# Patient Record
Sex: FEMALE | Race: WHITE | NOT HISPANIC OR LATINO | Employment: FULL TIME | URBAN - METROPOLITAN AREA
[De-identification: names, ages, dates, MRNs, and addresses within clinical notes are randomized per-mention and may not be internally consistent; named-entity substitution may affect disease eponyms.]

---

## 2017-09-05 ENCOUNTER — APPOINTMENT (OUTPATIENT)
Dept: WOMENS IMAGING | Facility: HOSPITAL | Age: 72
End: 2017-09-05

## 2017-09-05 PROCEDURE — 77063 BREAST TOMOSYNTHESIS BI: CPT | Performed by: RADIOLOGY

## 2017-09-05 PROCEDURE — G0202 SCR MAMMO BI INCL CAD: HCPCS | Performed by: RADIOLOGY

## 2020-03-06 ENCOUNTER — APPOINTMENT (OUTPATIENT)
Dept: WOMENS IMAGING | Facility: HOSPITAL | Age: 75
End: 2020-03-06

## 2020-03-06 PROCEDURE — 77063 BREAST TOMOSYNTHESIS BI: CPT | Performed by: RADIOLOGY

## 2020-03-06 PROCEDURE — 77067 SCR MAMMO BI INCL CAD: CPT | Performed by: RADIOLOGY

## 2020-12-17 ENCOUNTER — APPOINTMENT (OUTPATIENT)
Dept: CT IMAGING | Facility: HOSPITAL | Age: 75
End: 2020-12-17

## 2020-12-17 ENCOUNTER — HOSPITAL ENCOUNTER (EMERGENCY)
Facility: HOSPITAL | Age: 75
Discharge: HOME | End: 2020-12-17
Attending: EMERGENCY MEDICINE | Admitting: EMERGENCY MEDICINE

## 2020-12-17 VITALS
DIASTOLIC BLOOD PRESSURE: 83 MMHG | HEART RATE: 82 BPM | HEIGHT: 60 IN | TEMPERATURE: 98.6 F | RESPIRATION RATE: 18 BRPM | SYSTOLIC BLOOD PRESSURE: 178 MMHG | OXYGEN SATURATION: 99 %

## 2020-12-17 DIAGNOSIS — R19.7 VOMITING AND DIARRHEA: Primary | ICD-10-CM

## 2020-12-17 DIAGNOSIS — R11.10 VOMITING AND DIARRHEA: Primary | ICD-10-CM

## 2020-12-17 LAB
ALBUMIN SERPL-MCNC: 4 G/DL (ref 3.5–5.2)
ALBUMIN/GLOB SERPL: 1.4 G/DL
ALP SERPL-CCNC: 78 U/L (ref 39–117)
ALT SERPL W P-5'-P-CCNC: 19 U/L (ref 1–33)
ANION GAP SERPL CALCULATED.3IONS-SCNC: 11.6 MMOL/L (ref 5–15)
AST SERPL-CCNC: 24 U/L (ref 1–32)
BASOPHILS # BLD AUTO: 0.03 10*3/MM3 (ref 0–0.2)
BASOPHILS NFR BLD AUTO: 0.2 % (ref 0–1.5)
BILIRUB SERPL-MCNC: 0.4 MG/DL (ref 0–1.2)
BUN SERPL-MCNC: 17 MG/DL (ref 8–23)
BUN/CREAT SERPL: 22.7 (ref 7–25)
CALCIUM SPEC-SCNC: 8.6 MG/DL (ref 8.6–10.5)
CHLORIDE SERPL-SCNC: 100 MMOL/L (ref 98–107)
CO2 SERPL-SCNC: 28.4 MMOL/L (ref 22–29)
CREAT SERPL-MCNC: 0.75 MG/DL (ref 0.57–1)
DEPRECATED RDW RBC AUTO: 41.5 FL (ref 37–54)
EOSINOPHIL # BLD AUTO: 0.06 10*3/MM3 (ref 0–0.4)
EOSINOPHIL NFR BLD AUTO: 0.5 % (ref 0.3–6.2)
ERYTHROCYTE [DISTWIDTH] IN BLOOD BY AUTOMATED COUNT: 12.1 % (ref 12.3–15.4)
GFR SERPL CREATININE-BSD FRML MDRD: 76 ML/MIN/1.73
GLOBULIN UR ELPH-MCNC: 2.9 GM/DL
GLUCOSE SERPL-MCNC: 166 MG/DL (ref 65–99)
HCT VFR BLD AUTO: 40.4 % (ref 34–46.6)
HGB BLD-MCNC: 13.5 G/DL (ref 12–15.9)
IMM GRANULOCYTES # BLD AUTO: 0.08 10*3/MM3 (ref 0–0.05)
IMM GRANULOCYTES NFR BLD AUTO: 0.6 % (ref 0–0.5)
LIPASE SERPL-CCNC: 17 U/L (ref 13–60)
LYMPHOCYTES # BLD AUTO: 0.25 10*3/MM3 (ref 0.7–3.1)
LYMPHOCYTES NFR BLD AUTO: 1.9 % (ref 19.6–45.3)
MCH RBC QN AUTO: 31.5 PG (ref 26.6–33)
MCHC RBC AUTO-ENTMCNC: 33.4 G/DL (ref 31.5–35.7)
MCV RBC AUTO: 94.4 FL (ref 79–97)
MONOCYTES # BLD AUTO: 0.68 10*3/MM3 (ref 0.1–0.9)
MONOCYTES NFR BLD AUTO: 5.1 % (ref 5–12)
NEUTROPHILS NFR BLD AUTO: 12.23 10*3/MM3 (ref 1.7–7)
NEUTROPHILS NFR BLD AUTO: 91.7 % (ref 42.7–76)
NRBC BLD AUTO-RTO: 0 /100 WBC (ref 0–0.2)
PLATELET # BLD AUTO: 246 10*3/MM3 (ref 140–450)
PMV BLD AUTO: 10.2 FL (ref 6–12)
POTASSIUM SERPL-SCNC: 3.8 MMOL/L (ref 3.5–5.2)
PROT SERPL-MCNC: 6.9 G/DL (ref 6–8.5)
RBC # BLD AUTO: 4.28 10*6/MM3 (ref 3.77–5.28)
SODIUM SERPL-SCNC: 140 MMOL/L (ref 136–145)
TROPONIN T SERPL-MCNC: <0.01 NG/ML (ref 0–0.03)
WBC # BLD AUTO: 13.33 10*3/MM3 (ref 3.4–10.8)

## 2020-12-17 PROCEDURE — 25010000002 ONDANSETRON PER 1 MG: Performed by: EMERGENCY MEDICINE

## 2020-12-17 PROCEDURE — 96375 TX/PRO/DX INJ NEW DRUG ADDON: CPT

## 2020-12-17 PROCEDURE — 84484 ASSAY OF TROPONIN QUANT: CPT | Performed by: EMERGENCY MEDICINE

## 2020-12-17 PROCEDURE — C9803 HOPD COVID-19 SPEC COLLECT: HCPCS | Performed by: EMERGENCY MEDICINE

## 2020-12-17 PROCEDURE — 93005 ELECTROCARDIOGRAM TRACING: CPT | Performed by: EMERGENCY MEDICINE

## 2020-12-17 PROCEDURE — 25010000002 HYDROMORPHONE PER 4 MG: Performed by: EMERGENCY MEDICINE

## 2020-12-17 PROCEDURE — 85025 COMPLETE CBC W/AUTO DIFF WBC: CPT | Performed by: EMERGENCY MEDICINE

## 2020-12-17 PROCEDURE — 74177 CT ABD & PELVIS W/CONTRAST: CPT

## 2020-12-17 PROCEDURE — 93010 ELECTROCARDIOGRAM REPORT: CPT | Performed by: INTERNAL MEDICINE

## 2020-12-17 PROCEDURE — 99283 EMERGENCY DEPT VISIT LOW MDM: CPT

## 2020-12-17 PROCEDURE — 83690 ASSAY OF LIPASE: CPT | Performed by: EMERGENCY MEDICINE

## 2020-12-17 PROCEDURE — 80053 COMPREHEN METABOLIC PANEL: CPT | Performed by: EMERGENCY MEDICINE

## 2020-12-17 PROCEDURE — 96374 THER/PROPH/DIAG INJ IV PUSH: CPT

## 2020-12-17 PROCEDURE — U0004 COV-19 TEST NON-CDC HGH THRU: HCPCS | Performed by: EMERGENCY MEDICINE

## 2020-12-17 PROCEDURE — 25010000002 IOPAMIDOL 61 % SOLUTION: Performed by: EMERGENCY MEDICINE

## 2020-12-17 RX ORDER — HYDROMORPHONE HYDROCHLORIDE 1 MG/ML
0.5 INJECTION, SOLUTION INTRAMUSCULAR; INTRAVENOUS; SUBCUTANEOUS ONCE
Status: COMPLETED | OUTPATIENT
Start: 2020-12-17 | End: 2020-12-17

## 2020-12-17 RX ORDER — ONDANSETRON 4 MG/1
4 TABLET, ORALLY DISINTEGRATING ORAL 4 TIMES DAILY PRN
Qty: 10 TABLET | Refills: 0 | Status: SHIPPED | OUTPATIENT
Start: 2020-12-17 | End: 2022-09-01

## 2020-12-17 RX ORDER — ONDANSETRON 2 MG/ML
4 INJECTION INTRAMUSCULAR; INTRAVENOUS ONCE
Status: COMPLETED | OUTPATIENT
Start: 2020-12-17 | End: 2020-12-17

## 2020-12-17 RX ADMIN — HYDROMORPHONE HYDROCHLORIDE 0.5 MG: 1 INJECTION, SOLUTION INTRAMUSCULAR; INTRAVENOUS; SUBCUTANEOUS at 21:43

## 2020-12-17 RX ADMIN — ONDANSETRON 4 MG: 2 INJECTION INTRAMUSCULAR; INTRAVENOUS at 21:44

## 2020-12-17 RX ADMIN — IOPAMIDOL 85 ML: 612 INJECTION, SOLUTION INTRAVENOUS at 22:42

## 2020-12-17 RX ADMIN — SODIUM CHLORIDE 1000 ML: 9 INJECTION, SOLUTION INTRAVENOUS at 21:38

## 2020-12-18 LAB
QT INTERVAL: 400 MS
SARS-COV-2 RNA RESP QL NAA+PROBE: NOT DETECTED

## 2020-12-18 NOTE — ED PROVIDER NOTES
EMERGENCY DEPARTMENT ENCOUNTER    Room Number:  24/24  PCP: Kike Yu MD  Historian: Patient  History Limited By: Nothing      HPI  Chief Complaint: Abdominal pain vomiting and diarrhea  Context: Aliya Villaseñor is a 75 y.o. female who presents to the ED c/o abdominal pain vomiting and diarrhea.  Patient states she ate fish at noon.  States about 5:00 she started having abdominal pain with vomiting.  Shortly afterwards she started having diarrhea.  States abdominal pain is mostly on the right side.  Has had no dysuria or hematuria.  Has had no vaginal bleeding.  Has had no chest pain.  Patient has prior history of multiple abdominal surgeries.  Has had no fever cough congestion exposure to Covid patients.      Location: Right-sided  Radiation: None  Character: Cramping  Duration: 4 hours  Severity: Moderate   Progression: Not improving  Aggravating Factors: Nothing  Alleviating Factors: Nothing        MEDICAL RECORD REVIEW    History of anxiety and allergies per epic          PAST MEDICAL HISTORY  Active Ambulatory Problems     Diagnosis Date Noted   • No Active Ambulatory Problems     Resolved Ambulatory Problems     Diagnosis Date Noted   • No Resolved Ambulatory Problems     No Additional Past Medical History         PAST SURGICAL HISTORY  No past surgical history on file.      FAMILY HISTORY  No family history on file.      SOCIAL HISTORY  Social History     Socioeconomic History   • Marital status:      Spouse name: Not on file   • Number of children: Not on file   • Years of education: Not on file   • Highest education level: Not on file         ALLERGIES  Amlodipine, Cephalosporins, Ciprofloxacin, Erythromycin, Nefazodone, Sulfamethoxazole-trimethoprim, and Valsartan        REVIEW OF SYSTEMS  Review of Systems   Constitutional: Negative for fever.   HENT: Negative for sore throat.    Eyes: Negative.    Respiratory: Negative for cough and shortness of breath.    Cardiovascular: Negative for  chest pain.   Gastrointestinal: Positive for abdominal distention, diarrhea, nausea and vomiting. Negative for abdominal pain.   Genitourinary: Negative for dysuria.   Musculoskeletal: Negative for neck pain.   Skin: Negative for rash.   Allergic/Immunologic: Negative.    Neurological: Negative for weakness, numbness and headaches.   Hematological: Negative.    Psychiatric/Behavioral: Negative.    All other systems reviewed and are negative.           PHYSICAL EXAM  ED Triage Vitals [12/17/20 2122]   Temp Heart Rate Resp BP SpO2   97.5 °F (36.4 °C) 77 16 -- 95 %      Temp src Heart Rate Source Patient Position BP Location FiO2 (%)   Tympanic Monitor -- -- --       Physical Exam   Constitutional: She is oriented to person, place, and time. No distress.   HENT:   Head: Normocephalic and atraumatic.   Eyes: Pupils are equal, round, and reactive to light. EOM are normal.   Neck: Normal range of motion. Neck supple.   Cardiovascular: Normal rate, regular rhythm and normal heart sounds.   Pulmonary/Chest: Effort normal and breath sounds normal. No respiratory distress.   Abdominal: Soft. There is abdominal tenderness. There is no rebound and no guarding.   Moderate right lower quadrant   Musculoskeletal: Normal range of motion.         General: No edema.   Neurological: She is alert and oriented to person, place, and time. She has normal sensation and normal strength.   Skin: Skin is warm and dry. No rash noted.   Psychiatric: Mood and affect normal.   Nursing note and vitals reviewed.    Patient was wearing a face mask when I entered the room and they continued to wear a mask throughout their stay in the ED.  I wore PPE, including a gown, gloves, face mask with shield or face mask with goggles whenever I was in the room with patient.       LAB RESULTS  Recent Results (from the past 24 hour(s))   Comprehensive Metabolic Panel    Collection Time: 12/17/20  9:38 PM    Specimen: Blood   Result Value Ref Range    Glucose 166  (H) 65 - 99 mg/dL    BUN 17 8 - 23 mg/dL    Creatinine 0.75 0.57 - 1.00 mg/dL    Sodium 140 136 - 145 mmol/L    Potassium 3.8 3.5 - 5.2 mmol/L    Chloride 100 98 - 107 mmol/L    CO2 28.4 22.0 - 29.0 mmol/L    Calcium 8.6 8.6 - 10.5 mg/dL    Total Protein 6.9 6.0 - 8.5 g/dL    Albumin 4.00 3.50 - 5.20 g/dL    ALT (SGPT) 19 1 - 33 U/L    AST (SGOT) 24 1 - 32 U/L    Alkaline Phosphatase 78 39 - 117 U/L    Total Bilirubin 0.4 0.0 - 1.2 mg/dL    eGFR Non African Amer 76 >60 mL/min/1.73    Globulin 2.9 gm/dL    A/G Ratio 1.4 g/dL    BUN/Creatinine Ratio 22.7 7.0 - 25.0    Anion Gap 11.6 5.0 - 15.0 mmol/L   Lipase    Collection Time: 12/17/20  9:38 PM    Specimen: Blood   Result Value Ref Range    Lipase 17 13 - 60 U/L   Troponin    Collection Time: 12/17/20  9:38 PM    Specimen: Blood   Result Value Ref Range    Troponin T <0.010 0.000 - 0.030 ng/mL   CBC Auto Differential    Collection Time: 12/17/20  9:38 PM    Specimen: Blood   Result Value Ref Range    WBC 13.33 (H) 3.40 - 10.80 10*3/mm3    RBC 4.28 3.77 - 5.28 10*6/mm3    Hemoglobin 13.5 12.0 - 15.9 g/dL    Hematocrit 40.4 34.0 - 46.6 %    MCV 94.4 79.0 - 97.0 fL    MCH 31.5 26.6 - 33.0 pg    MCHC 33.4 31.5 - 35.7 g/dL    RDW 12.1 (L) 12.3 - 15.4 %    RDW-SD 41.5 37.0 - 54.0 fl    MPV 10.2 6.0 - 12.0 fL    Platelets 246 140 - 450 10*3/mm3    Neutrophil % 91.7 (H) 42.7 - 76.0 %    Lymphocyte % 1.9 (L) 19.6 - 45.3 %    Monocyte % 5.1 5.0 - 12.0 %    Eosinophil % 0.5 0.3 - 6.2 %    Basophil % 0.2 0.0 - 1.5 %    Immature Grans % 0.6 (H) 0.0 - 0.5 %    Neutrophils, Absolute 12.23 (H) 1.70 - 7.00 10*3/mm3    Lymphocytes, Absolute 0.25 (L) 0.70 - 3.10 10*3/mm3    Monocytes, Absolute 0.68 0.10 - 0.90 10*3/mm3    Eosinophils, Absolute 0.06 0.00 - 0.40 10*3/mm3    Basophils, Absolute 0.03 0.00 - 0.20 10*3/mm3    Immature Grans, Absolute 0.08 (H) 0.00 - 0.05 10*3/mm3    nRBC 0.0 0.0 - 0.2 /100 WBC   ECG 12 Lead    Collection Time: 12/17/20  9:40 PM   Result Value Ref Range     QT Interval 400 ms       Ordered the above labs and reviewed the results.        RADIOLOGY  CT Abdomen Pelvis With Contrast   Final Result   The patient has intrauterine extrahepatic biliary dilatation, uncertain   clinical significance. Common bile duct measures approximately 9 mm, in   the appearance may represent postcholecystectomy change, but the degree   of intrahepatic biliary dilatation is somewhat pronounced, and   correlation with liver function tests is suggested. No obstructing   lesion is seen, and there is no pancreatic ductal dilatation.       Radiation dose reduction techniques were utilized, including automated   exposure control and exposure modulation based on body size.       This report was finalized on 12/17/2020 11:11 PM by Dr. Shabana Leblanc M.D.               Ordered the above noted radiological studies. Reviewed by me in PACS.            PROCEDURES  Procedures      EKG:          EKG time: 2140  Rhythm/Rate: Normal sinus rhythm 74  P waves and MO: Normal P waves  QRS, axis: Normal QRS  ST and T waves: Normal ST-T waves    Interpreted Contemporaneously by me, independently viewed  No prior        MEDICATIONS GIVEN IN ER  Medications   sodium chloride 0.9 % bolus 1,000 mL (0 mL Intravenous Stopped 12/17/20 2235)   HYDROmorphone (DILAUDID) injection 0.5 mg (0.5 mg Intravenous Given 12/17/20 2143)   ondansetron (ZOFRAN) injection 4 mg (4 mg Intravenous Given 12/17/20 2144)   iopamidol (ISOVUE-300) 61 % injection 100 mL (85 mL Intravenous Given by Other 12/17/20 2242)             PROGRESS AND CONSULTS  ED Course as of Dec 18 0027   Thu Dec 17, 2020   2323 23:23 EST  Patient presents for vomiting and diarrhea.  Patient's lab work shows mildly elevated white blood cell count but normal liver function test.  CT scan shows no evidence of obstruction.  Patient does have some biliary dilatation however normal liver function test.  Patient has had no vomiting or diarrhea here.  Patient will be  discharged home with Zofran.  We will swab her for Covid prior to discharge.    [SL]      ED Course User Index  [SL] Jose Roberto Sanchez MD           MEDICAL DECISION MAKING      MDM  Number of Diagnoses or Management Options  Vomiting and diarrhea:      Amount and/or Complexity of Data Reviewed  Clinical lab tests: reviewed and ordered (Troponin 0)  Tests in the radiology section of CPT®: reviewed and ordered (CT scan with enteritis)  Tests in the medicine section of CPT®: reviewed               DIAGNOSIS  Final diagnoses:   Vomiting and diarrhea           DISPOSITION  DISCHARGE    Patient discharged in stable condition.    Reviewed implications of results, diagnosis, meds, responsibility to follow up, warning signs and symptoms of possible worsening, potential complications and reasons to return to ER, including worsening pain.    Patient/Family voiced understanding of above instructions.    Discussed plan for discharge, as there is no emergent indication for admission. Patient referred to primary care provider for BP management due to today's BP. Pt/family is agreeable and understands need for follow up and repeat testing.  Pt is aware that discharge does not mean that nothing is wrong but it indicates no emergency is present that requires admission and they must continue care with follow-up as given below or physician of their choice.     FOLLOW-UP  Kike Yu MD  [address] [id number] [phone]    Schedule an appointment as soon as possible for a visit            Medication List      New Prescriptions    ondansetron ODT 4 MG disintegrating tablet  Commonly known as: ZOFRAN-ODT  Place 1 tablet on the tongue 4 (Four) Times a Day As Needed for Nausea or Vomiting.           Where to Get Your Medications      You can get these medications from any pharmacy    Bring a paper prescription for each of these medications  · ondansetron ODT 4 MG disintegrating tablet             Latest  Documented Vital Signs:  As of 00:27 EST  BP- 178/83 HR- 82 Temp- 98.6 °F (37 °C) (Tympanic) O2 sat- 99%                         Jose Roberto Sanchez MD  12/18/20 0029

## 2020-12-18 NOTE — ED TRIAGE NOTES
Pt to ED from home with c/o diffuse abdominal pain and multiple episodes of nausea with vomiting that started at 5pm. Pt denies any chronic abdominal issues.  Pt denies fever, cough, SOB, or any recent exposures.    Pt masked upon arrival. This nurse wearing mask and goggles during entire encounter.

## 2022-09-01 ENCOUNTER — APPOINTMENT (OUTPATIENT)
Dept: CT IMAGING | Facility: HOSPITAL | Age: 77
End: 2022-09-01

## 2022-09-01 ENCOUNTER — APPOINTMENT (OUTPATIENT)
Dept: GENERAL RADIOLOGY | Facility: HOSPITAL | Age: 77
End: 2022-09-01

## 2022-09-01 ENCOUNTER — HOSPITAL ENCOUNTER (OUTPATIENT)
Facility: HOSPITAL | Age: 77
Setting detail: OBSERVATION
Discharge: HOME | End: 2022-09-02
Attending: EMERGENCY MEDICINE | Admitting: EMERGENCY MEDICINE

## 2022-09-01 ENCOUNTER — APPOINTMENT (OUTPATIENT)
Dept: MRI IMAGING | Facility: HOSPITAL | Age: 77
End: 2022-09-01

## 2022-09-01 DIAGNOSIS — R91.1 LEFT UPPER LOBE PULMONARY NODULE: ICD-10-CM

## 2022-09-01 DIAGNOSIS — R55 SYNCOPE, UNSPECIFIED SYNCOPE TYPE: Primary | ICD-10-CM

## 2022-09-01 DIAGNOSIS — R42 VERTIGO: ICD-10-CM

## 2022-09-01 PROBLEM — I63.9 CVA (CEREBRAL VASCULAR ACCIDENT) (HCC): Status: ACTIVE | Noted: 2022-09-01

## 2022-09-01 LAB
ALBUMIN SERPL-MCNC: 4 G/DL (ref 3.5–5.2)
ALBUMIN/GLOB SERPL: 1.2 G/DL
ALP SERPL-CCNC: 118 U/L (ref 39–117)
ALT SERPL W P-5'-P-CCNC: 22 U/L (ref 1–33)
ANION GAP SERPL CALCULATED.3IONS-SCNC: 10.4 MMOL/L (ref 5–15)
AST SERPL-CCNC: 20 U/L (ref 1–32)
BASOPHILS # BLD AUTO: 0.02 10*3/MM3 (ref 0–0.2)
BASOPHILS NFR BLD AUTO: 0.3 % (ref 0–1.5)
BILIRUB SERPL-MCNC: 0.3 MG/DL (ref 0–1.2)
BILIRUB UR QL STRIP: NEGATIVE
BUN SERPL-MCNC: 11 MG/DL (ref 8–23)
BUN/CREAT SERPL: 17.2 (ref 7–25)
CALCIUM SPEC-SCNC: 9.5 MG/DL (ref 8.6–10.5)
CHLORIDE SERPL-SCNC: 100 MMOL/L (ref 98–107)
CHOLEST SERPL-MCNC: 189 MG/DL (ref 0–200)
CLARITY UR: CLEAR
CO2 SERPL-SCNC: 30.6 MMOL/L (ref 22–29)
COLOR UR: YELLOW
CREAT SERPL-MCNC: 0.64 MG/DL (ref 0.57–1)
DEPRECATED RDW RBC AUTO: 39.2 FL (ref 37–54)
EGFRCR SERPLBLD CKD-EPI 2021: 91.7 ML/MIN/1.73
EOSINOPHIL # BLD AUTO: 0.06 10*3/MM3 (ref 0–0.4)
EOSINOPHIL NFR BLD AUTO: 0.9 % (ref 0.3–6.2)
ERYTHROCYTE [DISTWIDTH] IN BLOOD BY AUTOMATED COUNT: 12 % (ref 12.3–15.4)
GLOBULIN UR ELPH-MCNC: 3.3 GM/DL
GLUCOSE SERPL-MCNC: 96 MG/DL (ref 65–99)
GLUCOSE UR STRIP-MCNC: NEGATIVE MG/DL
HBA1C MFR BLD: 6 % (ref 4.8–5.6)
HCT VFR BLD AUTO: 37.9 % (ref 34–46.6)
HDLC SERPL-MCNC: 83 MG/DL (ref 40–60)
HGB BLD-MCNC: 12.6 G/DL (ref 12–15.9)
HGB UR QL STRIP.AUTO: NEGATIVE
IMM GRANULOCYTES # BLD AUTO: 0.01 10*3/MM3 (ref 0–0.05)
IMM GRANULOCYTES NFR BLD AUTO: 0.1 % (ref 0–0.5)
KETONES UR QL STRIP: NEGATIVE
LDLC SERPL CALC-MCNC: 82 MG/DL (ref 0–100)
LDLC/HDLC SERPL: 0.94 {RATIO}
LEUKOCYTE ESTERASE UR QL STRIP.AUTO: NEGATIVE
LYMPHOCYTES # BLD AUTO: 1.22 10*3/MM3 (ref 0.7–3.1)
LYMPHOCYTES NFR BLD AUTO: 17.6 % (ref 19.6–45.3)
MCH RBC QN AUTO: 29.8 PG (ref 26.6–33)
MCHC RBC AUTO-ENTMCNC: 33.2 G/DL (ref 31.5–35.7)
MCV RBC AUTO: 89.6 FL (ref 79–97)
MONOCYTES # BLD AUTO: 0.6 10*3/MM3 (ref 0.1–0.9)
MONOCYTES NFR BLD AUTO: 8.6 % (ref 5–12)
NEUTROPHILS NFR BLD AUTO: 5.04 10*3/MM3 (ref 1.7–7)
NEUTROPHILS NFR BLD AUTO: 72.5 % (ref 42.7–76)
NITRITE UR QL STRIP: NEGATIVE
NRBC BLD AUTO-RTO: 0 /100 WBC (ref 0–0.2)
PH UR STRIP.AUTO: 7 [PH] (ref 5–8)
PLATELET # BLD AUTO: 336 10*3/MM3 (ref 140–450)
PMV BLD AUTO: 10 FL (ref 6–12)
POTASSIUM SERPL-SCNC: 3.4 MMOL/L (ref 3.5–5.2)
PROT SERPL-MCNC: 7.3 G/DL (ref 6–8.5)
PROT UR QL STRIP: NEGATIVE
QT INTERVAL: 454 MS
RBC # BLD AUTO: 4.23 10*6/MM3 (ref 3.77–5.28)
SARS-COV-2 RNA RESP QL NAA+PROBE: DETECTED
SODIUM SERPL-SCNC: 141 MMOL/L (ref 136–145)
SP GR UR STRIP: 1.01 (ref 1–1.03)
TRIGL SERPL-MCNC: 140 MG/DL (ref 0–150)
TROPONIN T SERPL-MCNC: <0.01 NG/ML (ref 0–0.03)
UROBILINOGEN UR QL STRIP: NORMAL
VLDLC SERPL-MCNC: 24 MG/DL (ref 5–40)
WBC NRBC COR # BLD: 6.95 10*3/MM3 (ref 3.4–10.8)

## 2022-09-01 PROCEDURE — 80061 LIPID PANEL: CPT | Performed by: PHYSICIAN ASSISTANT

## 2022-09-01 PROCEDURE — 70450 CT HEAD/BRAIN W/O DYE: CPT

## 2022-09-01 PROCEDURE — 99205 OFFICE O/P NEW HI 60 MIN: CPT | Performed by: PSYCHIATRY & NEUROLOGY

## 2022-09-01 PROCEDURE — 93010 ELECTROCARDIOGRAM REPORT: CPT | Performed by: INTERNAL MEDICINE

## 2022-09-01 PROCEDURE — 70553 MRI BRAIN STEM W/O & W/DYE: CPT

## 2022-09-01 PROCEDURE — 83036 HEMOGLOBIN GLYCOSYLATED A1C: CPT | Performed by: PHYSICIAN ASSISTANT

## 2022-09-01 PROCEDURE — G0378 HOSPITAL OBSERVATION PER HR: HCPCS

## 2022-09-01 PROCEDURE — 85025 COMPLETE CBC W/AUTO DIFF WBC: CPT | Performed by: EMERGENCY MEDICINE

## 2022-09-01 PROCEDURE — 71045 X-RAY EXAM CHEST 1 VIEW: CPT

## 2022-09-01 PROCEDURE — 84484 ASSAY OF TROPONIN QUANT: CPT | Performed by: EMERGENCY MEDICINE

## 2022-09-01 PROCEDURE — 80053 COMPREHEN METABOLIC PANEL: CPT | Performed by: EMERGENCY MEDICINE

## 2022-09-01 PROCEDURE — 93005 ELECTROCARDIOGRAM TRACING: CPT | Performed by: EMERGENCY MEDICINE

## 2022-09-01 PROCEDURE — 81003 URINALYSIS AUTO W/O SCOPE: CPT | Performed by: EMERGENCY MEDICINE

## 2022-09-01 PROCEDURE — 0 GADOBENATE DIMEGLUMINE 529 MG/ML SOLUTION: Performed by: EMERGENCY MEDICINE

## 2022-09-01 PROCEDURE — 99285 EMERGENCY DEPT VISIT HI MDM: CPT

## 2022-09-01 PROCEDURE — U0003 INFECTIOUS AGENT DETECTION BY NUCLEIC ACID (DNA OR RNA); SEVERE ACUTE RESPIRATORY SYNDROME CORONAVIRUS 2 (SARS-COV-2) (CORONAVIRUS DISEASE [COVID-19]), AMPLIFIED PROBE TECHNIQUE, MAKING USE OF HIGH THROUGHPUT TECHNOLOGIES AS DESCRIBED BY CMS-2020-01-R: HCPCS | Performed by: EMERGENCY MEDICINE

## 2022-09-01 PROCEDURE — A9577 INJ MULTIHANCE: HCPCS | Performed by: EMERGENCY MEDICINE

## 2022-09-01 PROCEDURE — C9803 HOPD COVID-19 SPEC COLLECT: HCPCS

## 2022-09-01 PROCEDURE — 25010000002 ONDANSETRON PER 1 MG: Performed by: PHYSICIAN ASSISTANT

## 2022-09-01 PROCEDURE — 96361 HYDRATE IV INFUSION ADD-ON: CPT

## 2022-09-01 PROCEDURE — 25010000002 IOPAMIDOL 61 % SOLUTION: Performed by: EMERGENCY MEDICINE

## 2022-09-01 PROCEDURE — 96374 THER/PROPH/DIAG INJ IV PUSH: CPT

## 2022-09-01 PROCEDURE — 71260 CT THORAX DX C+: CPT

## 2022-09-01 RX ORDER — ALPRAZOLAM 0.5 MG/1
0.5 TABLET ORAL NIGHTLY PRN
COMMUNITY
Start: 2022-08-11

## 2022-09-01 RX ORDER — ACETAMINOPHEN 650 MG/1
650 SUPPOSITORY RECTAL EVERY 4 HOURS PRN
Status: DISCONTINUED | OUTPATIENT
Start: 2022-09-01 | End: 2022-09-02 | Stop reason: HOSPADM

## 2022-09-01 RX ORDER — SODIUM CHLORIDE 0.9 % (FLUSH) 0.9 %
10 SYRINGE (ML) INJECTION AS NEEDED
Status: DISCONTINUED | OUTPATIENT
Start: 2022-09-01 | End: 2022-09-02 | Stop reason: HOSPADM

## 2022-09-01 RX ORDER — MECLIZINE HYDROCHLORIDE 25 MG/1
25 TABLET ORAL ONCE
Status: COMPLETED | OUTPATIENT
Start: 2022-09-01 | End: 2022-09-01

## 2022-09-01 RX ORDER — ACETAMINOPHEN 325 MG/1
650 TABLET ORAL EVERY 4 HOURS PRN
Status: DISCONTINUED | OUTPATIENT
Start: 2022-09-01 | End: 2022-09-02 | Stop reason: HOSPADM

## 2022-09-01 RX ORDER — BUDESONIDE AND FORMOTEROL FUMARATE DIHYDRATE 160; 4.5 UG/1; UG/1
2 AEROSOL RESPIRATORY (INHALATION)
Status: DISCONTINUED | OUTPATIENT
Start: 2022-09-01 | End: 2022-09-02 | Stop reason: HOSPADM

## 2022-09-01 RX ORDER — CARVEDILOL 25 MG/1
25 TABLET ORAL 2 TIMES DAILY WITH MEALS
Status: DISCONTINUED | OUTPATIENT
Start: 2022-09-02 | End: 2022-09-02 | Stop reason: HOSPADM

## 2022-09-01 RX ORDER — ESTRADIOL 2 MG/1
2 TABLET ORAL DAILY
Status: DISCONTINUED | OUTPATIENT
Start: 2022-09-02 | End: 2022-09-02 | Stop reason: HOSPADM

## 2022-09-01 RX ORDER — VALSARTAN 80 MG/1
160 TABLET ORAL 2 TIMES DAILY
COMMUNITY
Start: 2022-04-26

## 2022-09-01 RX ORDER — ALPRAZOLAM 0.5 MG/1
0.5 TABLET ORAL NIGHTLY PRN
Status: DISCONTINUED | OUTPATIENT
Start: 2022-09-01 | End: 2022-09-02 | Stop reason: HOSPADM

## 2022-09-01 RX ORDER — ACETAMINOPHEN 160 MG/5ML
650 SOLUTION ORAL EVERY 4 HOURS PRN
Status: DISCONTINUED | OUTPATIENT
Start: 2022-09-01 | End: 2022-09-02 | Stop reason: HOSPADM

## 2022-09-01 RX ORDER — SODIUM CHLORIDE 0.9 % (FLUSH) 0.9 %
10 SYRINGE (ML) INJECTION EVERY 12 HOURS SCHEDULED
Status: DISCONTINUED | OUTPATIENT
Start: 2022-09-01 | End: 2022-09-02 | Stop reason: HOSPADM

## 2022-09-01 RX ORDER — ATORVASTATIN CALCIUM 20 MG/1
40 TABLET, FILM COATED ORAL DAILY
Status: DISCONTINUED | OUTPATIENT
Start: 2022-09-02 | End: 2022-09-02 | Stop reason: HOSPADM

## 2022-09-01 RX ORDER — LORATADINE 10 MG/1
10 TABLET ORAL 2 TIMES DAILY
COMMUNITY

## 2022-09-01 RX ORDER — FLUTICASONE PROPIONATE AND SALMETEROL 250; 50 UG/1; UG/1
POWDER RESPIRATORY (INHALATION)
COMMUNITY
Start: 2022-08-19 | End: 2022-10-20

## 2022-09-01 RX ORDER — ONDANSETRON 2 MG/ML
4 INJECTION INTRAMUSCULAR; INTRAVENOUS EVERY 6 HOURS PRN
Status: DISCONTINUED | OUTPATIENT
Start: 2022-09-01 | End: 2022-09-02 | Stop reason: HOSPADM

## 2022-09-01 RX ORDER — SODIUM CHLORIDE, SODIUM LACTATE, POTASSIUM CHLORIDE, CALCIUM CHLORIDE 600; 310; 30; 20 MG/100ML; MG/100ML; MG/100ML; MG/100ML
100 INJECTION, SOLUTION INTRAVENOUS CONTINUOUS
Status: DISCONTINUED | OUTPATIENT
Start: 2022-09-01 | End: 2022-09-02 | Stop reason: HOSPADM

## 2022-09-01 RX ORDER — SCOLOPAMINE TRANSDERMAL SYSTEM 1 MG/1
1 PATCH, EXTENDED RELEASE TRANSDERMAL
Status: DISCONTINUED | OUTPATIENT
Start: 2022-09-01 | End: 2022-09-02 | Stop reason: HOSPADM

## 2022-09-01 RX ORDER — CARVEDILOL 25 MG/1
25 TABLET ORAL 2 TIMES DAILY WITH MEALS
COMMUNITY

## 2022-09-01 RX ORDER — NITROGLYCERIN 0.4 MG/1
0.4 TABLET SUBLINGUAL
Status: DISCONTINUED | OUTPATIENT
Start: 2022-09-01 | End: 2022-09-02 | Stop reason: HOSPADM

## 2022-09-01 RX ORDER — ESTRADIOL 2 MG/1
2 TABLET ORAL DAILY
COMMUNITY
Start: 2022-04-13

## 2022-09-01 RX ORDER — CETIRIZINE HYDROCHLORIDE 10 MG/1
10 TABLET ORAL DAILY
Status: DISCONTINUED | OUTPATIENT
Start: 2022-09-02 | End: 2022-09-02 | Stop reason: HOSPADM

## 2022-09-01 RX ORDER — CHOLECALCIFEROL (VITAMIN D3) 125 MCG
5 CAPSULE ORAL NIGHTLY PRN
Status: DISCONTINUED | OUTPATIENT
Start: 2022-09-01 | End: 2022-09-02 | Stop reason: HOSPADM

## 2022-09-01 RX ORDER — ONDANSETRON 4 MG/1
4 TABLET, FILM COATED ORAL EVERY 6 HOURS PRN
Status: DISCONTINUED | OUTPATIENT
Start: 2022-09-01 | End: 2022-09-02 | Stop reason: HOSPADM

## 2022-09-01 RX ORDER — VALSARTAN 160 MG/1
160 TABLET ORAL 2 TIMES DAILY
Status: DISCONTINUED | OUTPATIENT
Start: 2022-09-01 | End: 2022-09-02 | Stop reason: HOSPADM

## 2022-09-01 RX ADMIN — Medication 10 ML: at 22:17

## 2022-09-01 RX ADMIN — SODIUM CHLORIDE, POTASSIUM CHLORIDE, SODIUM LACTATE AND CALCIUM CHLORIDE 100 ML/HR: 600; 310; 30; 20 INJECTION, SOLUTION INTRAVENOUS at 13:31

## 2022-09-01 RX ADMIN — VALSARTAN 160 MG: 160 TABLET, FILM COATED ORAL at 23:50

## 2022-09-01 RX ADMIN — SCOPALAMINE 1 PATCH: 1 PATCH, EXTENDED RELEASE TRANSDERMAL at 13:33

## 2022-09-01 RX ADMIN — ALPRAZOLAM 0.5 MG: 0.5 TABLET ORAL at 23:50

## 2022-09-01 RX ADMIN — GADOBENATE DIMEGLUMINE 8 ML: 529 INJECTION, SOLUTION INTRAVENOUS at 20:24

## 2022-09-01 RX ADMIN — MECLIZINE HYDROCHLORIDE 25 MG: 25 TABLET ORAL at 10:22

## 2022-09-01 RX ADMIN — ACETAMINOPHEN 650 MG: 325 TABLET, FILM COATED ORAL at 21:50

## 2022-09-01 RX ADMIN — SODIUM CHLORIDE 1000 ML: 9 INJECTION, SOLUTION INTRAVENOUS at 10:16

## 2022-09-01 RX ADMIN — ONDANSETRON 4 MG: 2 INJECTION INTRAMUSCULAR; INTRAVENOUS at 16:14

## 2022-09-01 RX ADMIN — IOPAMIDOL 85 ML: 612 INJECTION, SOLUTION INTRAVENOUS at 22:01

## 2022-09-01 RX ADMIN — Medication 10 ML: at 13:29

## 2022-09-01 NOTE — ED NOTES
"Nursing report ED to floor  Aliya Villaseñor  77 y.o.  female    HPI :   Chief Complaint   Patient presents with   • COVID +   • Dizziness       Admitting doctor:   Juan Miguel Peñaloza MD    Admitting diagnosis:   The primary encounter diagnosis was Syncope, unspecified syncope type. A diagnosis of Vertigo was also pertinent to this visit.    Code status:   Current Code Status     Date Active Code Status Order ID Comments User Context       9/1/2022 1225 CPR (Attempt to Resuscitate) [id number]  [facility]     Advance Care Planning Activity      Questions for Current Code Status     Question Answer    Code Status (Patient has no pulse and is not breathing) CPR (Attempt to Resuscitate)    Medical Interventions (Patient has pulse or is breathing) Full Support    Level Of Support Discussed With Patient          Allergies:   Amlodipine, Cephalosporins, Ciprofloxacin, Erythromycin, Nefazodone, Sulfamethoxazole-trimethoprim, and Valsartan    Intake and Output  No intake or output data in the 24 hours ending 09/01/22 1658    Weight:       09/01/22  1329   Weight: 42.5 kg (93 lb 9.6 oz)       Most recent vitals:   Vitals:    09/01/22 1329 09/01/22 1331 09/01/22 1444 09/01/22 1501   BP:  (!) 195/83 (!) 190/94 114/94   BP Location:   Right arm    Patient Position:   Sitting    Pulse:  68 68 69   Resp:  17 18 17   Temp:       TempSrc:       SpO2:  91% 97% 96%   Weight: 42.5 kg (93 lb 9.6 oz)      Height: 152.4 cm (60\")          Active LDAs/IV Access:   Lines, Drains & Airways     Active LDAs     Name Placement date Placement time Site Days    Peripheral IV 09/01/22 1015 Left Antecubital 09/01/22  1015  Antecubital  less than 1                Labs (abnormal labs have a star):   Labs Reviewed   COVID-19,[facility] IN-HOUSE CEPHEID/[facility], NP SWAB IN TRANSPORT MEDIA 8-12 HR TAT - Abnormal; Notable for the following components:       Result Value    COVID19 Detected (*)     All other components within normal limits    Narrative:     " Fact sheet for providers: https://www.fda.gov/media/102990/download     Fact sheet for patients: https://www.fda.gov/media/235620/download   COMPREHENSIVE METABOLIC PANEL - Abnormal; Notable for the following components:    Potassium 3.4 (*)     CO2 30.6 (*)     Alkaline Phosphatase 118 (*)     All other components within normal limits    Narrative:     GFR Normal >60  Chronic Kidney Disease <60  Kidney Failure <15     CBC WITH AUTO DIFFERENTIAL - Abnormal; Notable for the following components:    RDW 12.0 (*)     Lymphocyte % 17.6 (*)     All other components within normal limits   LIPID PANEL - Abnormal; Notable for the following components:    HDL Cholesterol 83 (*)     All other components within normal limits    Narrative:     Cholesterol Reference Ranges  (U.S. Department of Health and Human Services ATP III Classifications)    Desirable          <200 mg/dL  Borderline High    200-239 mg/dL  High Risk          >240 mg/dL      Triglyceride Reference Ranges  (U.S. Department of Health and Human Services ATP III Classifications)    Normal           <150 mg/dL  Borderline High  150-199 mg/dL  High             200-499 mg/dL  Very High        >500 mg/dL    HDL Reference Ranges  (U.S. Department of Health and Human Services ATP III Classifications)    Low     <40 mg/dl (major risk factor for CHD)  High    >60 mg/dl ('negative' risk factor for CHD)        LDL Reference Ranges  (U.S. Department of Health and Human Services ATP III Classifications)    Optimal          <100 mg/dL  Near Optimal     100-129 mg/dL  Borderline High  130-159 mg/dL  High             160-189 mg/dL  Very High        >189 mg/dL   HEMOGLOBIN A1C - Abnormal; Notable for the following components:    Hemoglobin A1C 6.00 (*)     All other components within normal limits    Narrative:     Hemoglobin A1C Ranges:    Increased Risk for Diabetes  5.7% to 6.4%  Diabetes                     >= 6.5%  Diabetic Goal                < 7.0%   TROPONIN (IN-HOUSE) -  Normal    Narrative:     Troponin T Reference Range:  <= 0.03 ng/mL-   Negative for AMI  >0.03 ng/mL-     Abnormal for myocardial necrosis.  Clinicians would have to utilize clinical acumen, EKG, Troponin and serial changes to determine if it is an Acute Myocardial Infarction or myocardial injury due to an underlying chronic condition.       Results may be falsely decreased if patient taking Biotin.     URINALYSIS W/ MICROSCOPIC IF INDICATED (NO CULTURE) - Normal    Narrative:     Urine microscopic not indicated.   COVID PRE-OP / PRE-PROCEDURE SCREENING ORDER (NO ISOLATION)    Narrative:     The following orders were created for panel order COVID PRE-OP / PRE-PROCEDURE SCREENING ORDER (NO ISOLATION) - Swab, Nasopharynx.  Procedure                               Abnormality         Status                     ---------                               -----------         ------                     COVID-[facility] IN-HOUSE...[[id number]]  Abnormal            Final result                 Please view results for these tests on the individual orders.   CBC AND DIFFERENTIAL    Narrative:     The following orders were created for panel order CBC & Differential.  Procedure                               Abnormality         Status                     ---------                               -----------         ------                     CBC Auto Differential[[id number]]        Abnormal            Final result                 Please view results for these tests on the individual orders.       EKG:   ECG 12 Lead   Final Result   HEART RATE= 68  bpm   RR Interval= 882  ms   TN Interval= 186  ms   P Horizontal Axis= -30  deg   P Front Axis= 55  deg   QRSD Interval= 96  ms   QT Interval= 454  ms   QRS Axis= -53  deg   T Wave Axis= 43  deg   - ABNORMAL ECG -   Sinus rhythm   LAD, consider left anterior fascicular block   No change from previous tracing   Electronically Signed By: Jacobo Harrison) ([facility]) 01-Sep-2022 13:40:30   Date and  Time of Study: 2022-09-01 10:57:42          Meds given in ED:   Medications   sodium chloride 0.9 % flush 10 mL (has no administration in time range)   nitroglycerin (NITROSTAT) SL tablet 0.4 mg (has no administration in time range)   sodium chloride 0.9 % flush 10 mL (10 mL Intravenous Given 9/1/22 1329)   sodium chloride 0.9 % flush 10 mL (has no administration in time range)   ondansetron (ZOFRAN) tablet 4 mg ( Oral Not Given:  See Alt 9/1/22 1614)     Or   ondansetron (ZOFRAN) injection 4 mg (4 mg Intravenous Given 9/1/22 1614)   lactated ringers infusion (100 mL/hr Intravenous New Bag 9/1/22 1331)   acetaminophen (TYLENOL) tablet 650 mg (has no administration in time range)     Or   acetaminophen (TYLENOL) 160 MG/5ML solution 650 mg (has no administration in time range)     Or   acetaminophen (TYLENOL) suppository 650 mg (has no administration in time range)   melatonin tablet 5 mg (has no administration in time range)   scopolamine patch 1 mg/72 hr (1 patch Transdermal Medication Applied 9/1/22 1333)   sodium chloride 0.9 % bolus 1,000 mL (0 mL Intravenous Stopped 9/1/22 1137)   meclizine (ANTIVERT) tablet 25 mg (25 mg Oral Given 9/1/22 1022)       Imaging results:  CT Head Without Contrast    Result Date: 9/1/2022  Small vessel ischemic disease and scattered lacunar infarcts involving the corona radiata are noted bilaterally. There is no evidence of acute infarction or hemorrhage. Further evaluation could be performed with MRI examination of the brain as indicated.    Radiation dose reduction techniques were utilized, including automated exposure control and exposure modulation based on body size.         Ambulatory status:   - ad susan    Social issues:   Social History     Socioeconomic History   • Marital status:        NIH Stroke Scale:        Nursing report ED to floor:

## 2022-09-01 NOTE — H&P
[facility]   HISTORY AND PHYSICAL    Patient Name: Aliya Villaseñor  : 1945  MRN: [id number]  Primary Care Physician:  Kike Yu MD  Date of admission: 2022    Subjective   Subjective     Chief Complaint:   Chief Complaint   Patient presents with   • COVID +   • Dizziness         HPI:    Aliya Villaseñor is a 77 y.o. female with hypertension, anxiety, allergies, COPD, and history of melanoma, who presented to the emergency department today complaining of dizziness.  Patient states that she tested positive for COVID on .  Her symptoms have since resolved.  She did take Paxlovid but only for 3 days because she states she was having side effects.  Patient had syncopal episode on  and states she had another syncopal event since then but cannot recall the date.  Patient states since then she has had room spinning and lightheaded dizziness.  Patient states it is worse when she sits up.  Patient states she has had intermittent dizziness for years but has never been treated for this.  Patient denies chest pain, shortness of breath, palpitations, nausea, vomiting, diarrhea, fever, chills.    ED evaluation reviewed, orthostatic vital signs positive by blood pressure.  Patient has been receiving IV fluids in ED.  Patient had CT head which shows evidence of prior ischemic insults.  She received meclizine without significant improvement in her symptoms.    Review of Systems   All systems were reviewed and negative except for: What is discussed in the HPI    Personal History     Past Medical History:   Diagnosis Date   • Cancer (HCC)    • COPD (chronic obstructive pulmonary disease) (HCC)     [facility] COPD   • Diverticulitis    • Melanoma (HCC) 2019    right leg       Past Surgical History:   Procedure Laterality Date   • HYSTERECTOMY     • SKIN BIOPSY         Family History: family history is not on file. Otherwise pertinent FHx was reviewed and not pertinent to current issue.    Social  History:      Home Medications:  ALPRAZolam, Fluticasone-Salmeterol, Fluticasone-Umeclidin-Vilant, carvedilol, estradiol, loratadine, and valsartan    Allergies:  Allergies   Allergen Reactions   • Amlodipine Unknown - Low Severity   • Cephalosporins Unknown - Low Severity   • Ciprofloxacin Unknown - Low Severity   • Erythromycin Unknown - Low Severity   • Nefazodone Unknown - Low Severity   • Sulfamethoxazole-Trimethoprim Unknown - Low Severity   • Valsartan Unknown - Low Severity       Objective   Objective     Vitals:   Temp:  [98.7 °F (37.1 °C)] 98.7 °F (37.1 °C)  Heart Rate:  [64-79] 68  Resp:  [16-17] 17  BP: (144-195)/() 195/83  Physical Exam     GENERAL: 77 y.o. YO female a/o x 4, NAD  SKIN: Warm pink and dry   HEENT:  PERRL, EOM intact, conjunctivae normal, sclerae clear  NECK: supple, no JVD  LUNGS: Clear to auscultation bilaterally without wheezes, rales or rhonchi.  No accessory muscle use and no nasal flaring.  CARDIAC:  Regular rate and rhythm, S1-S2.  No murmurs, rubs or gallops.  No peripheral edema.  Equal pulses bilaterally.  ABDOMEN: Soft, nontender, nondistended.  No guarding or rebound tenderness.  Normal bowel sounds.  MUSCULOSKELETAL: Moves all extremities well.  No deformity.  NEURO: Cranial nerves II through XII grossly intact.  No gross focal deficits.  Alert.  Normal speech and motor.  PSYCH: Normal mood and affect      Result Review    Result Review:  I have personally reviewed the results from the time of this admission to 9/1/2022 14:29 EDT and agree with these findings:  [x]  Laboratory list / accordion  []  Microbiology  [x]  Radiology  []  EKG/Telemetry   []  Cardiology/Vascular   []  Pathology  []  Old records  []  Other:  Most notable findings include: Potassium 3.4    CT Head Without Contrast    Result Date: 9/1/2022  Small vessel ischemic disease and scattered lacunar infarcts involving the corona radiata are noted bilaterally. There is no evidence of acute infarction or  hemorrhage. Further evaluation could be performed with MRI examination of the brain as indicated.    Radiation dose reduction techniques were utilized, including automated exposure control and exposure modulation based on body size.           Assessment & Plan   Assessment / Plan     Brief Patient Summary:  Aliya Villaseñor is a 77 y.o. female who is being mated observation unit for further evaluation of dizziness.    Active Hospital Problems:  Active Hospital Problems    Diagnosis    • Dizziness      Plan:     Dizziness  -BPPV versus posterior stroke  -MRI brain and internal auditory canal with and without  -A scopolamine patch  -Consult neurology  -PT for Epley    Syncope  -Suspect this may have been due to dehydration  -IV fluids  -Neurology consulted    Recent COVID-19 infection  -Positive test on 8/24  -Status post 3-day course of Paxlovid   -Currently asymptomatic, continue to monitor    Hypertension/anxiety/allergies/COPD  -Chronic conditions, no new issues  -Continue home medications as prescribed    DVT prophylaxis:  Mechanical DVT prophylaxis orders are present.    CODE STATUS:    Level Of Support Discussed With: Patient  Code Status (Patient has no pulse and is not breathing): CPR (Attempt to Resuscitate)  Medical Interventions (Patient has pulse or is breathing): Full Support    Admission Status:  I believe this patient meets observation status.    I wore an N95 mask, face shield, and gloves during this patient encounter. Patient also wearing a surgical mask throughout encounter. Hand hygeine performed before and after seeing the patient.    Electronically signed by LOIDA Avendaño, 09/01/22, 12:26 PM EDT.

## 2022-09-01 NOTE — ED PROVIDER NOTES
EMERGENCY DEPARTMENT ENCOUNTER    Room Number:  15/15  Date of encounter:  9/1/2022  PCP: Kike Yu MD  Patient Care Team:  Kike Yu MD as PCP - General (Family Medicine)  Provider, No Known as PCP - Family Medicine  Silvana, Dayo Patton MD (Inactive) as Consulting Physician (Obstetrics and Gynecology)   Historian: Patient    HPI:  Chief Complaint: Dizziness  A complete HPI/ROS/PMH/PSH/SH/FH are unobtainable due to: Nothing    Context: Aliya Villaseñor is a 77 y.o. female who presents to the ED c/o dizziness.  She reports that she developed symptoms of COVID on the 24th.  She states that she tested positive for COVID.  She passed out on the 26th.  She has passed out once more since then.  She states that her daughter told her her eyes rolled into the back of her head but she did not have any shaking movements.  She states her daughter was concerned that she had a seizure.  She does not have a history of seizures.  The patient reports that she has experienced dizziness for very long time which she describes as room spinning.  She states that she has not been eating or drinking as much due to the COVID.  She reports that her primary care doctor put her on paxlovid and she took 3 days of therapy before stopping it because it made her feel ill.  She has no chest pain or shortness of breath.  She states she has not had any fevers in several days.  She reports persistent room spinning and lightheaded dizziness that is worse when she sits up.  She states that she is talked to her doctors in the past about her dizziness but it is never gotten better.    Prior record review: ER visit 12/17/2020 with abdominal pain nausea and vomiting and diarrhea    PAST MEDICAL HISTORY  Active Ambulatory Problems     Diagnosis Date Noted   • No Active Ambulatory Problems     Resolved Ambulatory Problems     Diagnosis Date Noted   • No Resolved Ambulatory Problems     No Additional Past Medical History       The  patient has started, but not completed, their COVID-19 vaccination series.    PAST SURGICAL HISTORY  No past surgical history on file.      FAMILY HISTORY  No family history on file.      SOCIAL HISTORY  Social History     Socioeconomic History   • Marital status:          ALLERGIES  Amlodipine, Cephalosporins, Ciprofloxacin, Erythromycin, Nefazodone, Sulfamethoxazole-trimethoprim, and Valsartan        REVIEW OF SYSTEMS  Review of Systems   No chest pain, no shortness of breath, no palpitations, positive syncope, no nausea, vomiting, positive dizziness, negative focal weakness, negative focal numbness  All systems reviewed and negative except for those discussed in HPI.       PHYSICAL EXAM    I have reviewed the triage vital signs and nursing notes.    ED Triage Vitals [09/01/22 0927]   Temp Heart Rate Resp BP SpO2   98.7 °F (37.1 °C) 79 16 -- 99 %      Temp src Heart Rate Source Patient Position BP Location FiO2 (%)   Tympanic Monitor -- -- --       Physical Exam  GENERAL: Awake, alert, no acute distress  SKIN: Warm, dry  HENT: Normocephalic, atraumatic, TMs bilaterally normal  EYES: no scleral icterus  CV: regular rhythm, regular rate  RESPIRATORY: normal effort, lungs clear  ABDOMEN: soft, nontender, nondistended  MUSCULOSKELETAL: no deformity  NEURO: alert, moves all extremities, follows commands          LAB RESULTS  Recent Results (from the past 24 hour(s))   Comprehensive Metabolic Panel    Collection Time: 09/01/22 10:13 AM    Specimen: Blood   Result Value Ref Range    Glucose 96 65 - 99 mg/dL    BUN 11 8 - 23 mg/dL    Creatinine 0.64 0.57 - 1.00 mg/dL    Sodium 141 136 - 145 mmol/L    Potassium 3.4 (L) 3.5 - 5.2 mmol/L    Chloride 100 98 - 107 mmol/L    CO2 30.6 (H) 22.0 - 29.0 mmol/L    Calcium 9.5 8.6 - 10.5 mg/dL    Total Protein 7.3 6.0 - 8.5 g/dL    Albumin 4.00 3.50 - 5.20 g/dL    ALT (SGPT) 22 1 - 33 U/L    AST (SGOT) 20 1 - 32 U/L    Alkaline Phosphatase 118 (H) 39 - 117 U/L    Total  Bilirubin 0.3 0.0 - 1.2 mg/dL    Globulin 3.3 gm/dL    A/G Ratio 1.2 g/dL    BUN/Creatinine Ratio 17.2 7.0 - 25.0    Anion Gap 10.4 5.0 - 15.0 mmol/L    eGFR 91.7 >60.0 mL/min/1.73   Troponin    Collection Time: 09/01/22 10:13 AM    Specimen: Blood   Result Value Ref Range    Troponin T <0.010 0.000 - 0.030 ng/mL   CBC Auto Differential    Collection Time: 09/01/22 10:13 AM    Specimen: Blood   Result Value Ref Range    WBC 6.95 3.40 - 10.80 10*3/mm3    RBC 4.23 3.77 - 5.28 10*6/mm3    Hemoglobin 12.6 12.0 - 15.9 g/dL    Hematocrit 37.9 34.0 - 46.6 %    MCV 89.6 79.0 - 97.0 fL    MCH 29.8 26.6 - 33.0 pg    MCHC 33.2 31.5 - 35.7 g/dL    RDW 12.0 (L) 12.3 - 15.4 %    RDW-SD 39.2 37.0 - 54.0 fl    MPV 10.0 6.0 - 12.0 fL    Platelets 336 140 - 450 10*3/mm3    Neutrophil % 72.5 42.7 - 76.0 %    Lymphocyte % 17.6 (L) 19.6 - 45.3 %    Monocyte % 8.6 5.0 - 12.0 %    Eosinophil % 0.9 0.3 - 6.2 %    Basophil % 0.3 0.0 - 1.5 %    Immature Grans % 0.1 0.0 - 0.5 %    Neutrophils, Absolute 5.04 1.70 - 7.00 10*3/mm3    Lymphocytes, Absolute 1.22 0.70 - 3.10 10*3/mm3    Monocytes, Absolute 0.60 0.10 - 0.90 10*3/mm3    Eosinophils, Absolute 0.06 0.00 - 0.40 10*3/mm3    Basophils, Absolute 0.02 0.00 - 0.20 10*3/mm3    Immature Grans, Absolute 0.01 0.00 - 0.05 10*3/mm3    nRBC 0.0 0.0 - 0.2 /100 WBC   ECG 12 Lead    Collection Time: 09/01/22 10:57 AM   Result Value Ref Range    QT Interval 454 ms   Urinalysis With Microscopic If Indicated (No Culture) - Urine, Clean Catch    Collection Time: 09/01/22 11:18 AM    Specimen: Urine, Clean Catch   Result Value Ref Range    Color, UA Yellow Yellow, Straw    Appearance, UA Clear Clear    pH, UA 7.0 5.0 - 8.0    Specific Gravity, UA 1.006 1.005 - 1.030    Glucose, UA Negative Negative    Ketones, UA Negative Negative    Bilirubin, UA Negative Negative    Blood, UA Negative Negative    Protein, UA Negative Negative    Leuk Esterase, UA Negative Negative    Nitrite, UA Negative Negative     Urobilinogen, UA 0.2 E.U./dL 0.2 - 1.0 E.U./dL       Ordered the above labs and independently reviewed the results.        RADIOLOGY  CT Head Without Contrast    Result Date: 9/1/2022  CT HEAD WITHOUT CONTRAST  HISTORY: Syncope.  COMPARISON: None.  FINDINGS: The brain and ventricles are symmetrical. There is no evidence of hemorrhage, hydrocephalus or of abnormal extra-axial fluid. Decreased attenuation involving the white matter of the cerebral hemispheres is appreciated bilaterally consistent with moderate-to-severe small vessel ischemic disease and scattered lacunar infarcts involving the corona radiata bilaterally. No focal area of decreased attenuation to suggest acute infarction is identified.      Small vessel ischemic disease and scattered lacunar infarcts involving the corona radiata are noted bilaterally. There is no evidence of acute infarction or hemorrhage. Further evaluation could be performed with MRI examination of the brain as indicated.    Radiation dose reduction techniques were utilized, including automated exposure control and exposure modulation based on body size.       XR Chest 1 View    Result Date: 9/1/2022  XR CHEST 1 VW-  Clinical: Covid positive, vertigo  COMPARISON: None  FINDINGS: There is atherosclerotic calcification of the aorta, heart size within normal limits. No effusion or edema. Right lung is clear. Vague 2.3 cm opacity demonstrated within the left mid lung zone, this may simply represent superimposition of normal parenchymal markings, ribs and scapula however nodule or focal airspace disease not excluded. PA and lateral view of the chest would be the best means for further evaluation when patient condition permits.  This report was finalized on 9/1/2022 10:28 AM by Dr. Papito Ang M.D.        I ordered the above noted radiological studies. Reviewed by me and discussed with radiologist.  See dictation for official radiology  interpretation.      PROCEDURES    Procedures      MEDICATIONS GIVEN IN ER    Medications   sodium chloride 0.9 % flush 10 mL (has no administration in time range)   sodium chloride 0.9 % bolus 1,000 mL (0 mL Intravenous Stopped 9/1/22 1137)   meclizine (ANTIVERT) tablet 25 mg (25 mg Oral Given 9/1/22 1022)         PROGRESS, DATA ANALYSIS, CONSULTS, AND MEDICAL DECISION MAKING    All labs have been independently reviewed by me.  All radiology studies have been reviewed by me and discussed with radiologist dictating the report.   EKG's independently viewed and interpreted by me.  Discussion below represents my analysis of pertinent findings related to patient's condition, differential diagnosis, treatment plan and final disposition.    Differential diagnosis includes but is not limited to dehydration, COVID, PE, intracranial hemorrhage, vertigo, syncope, seizure.    ED Course as of 09/01/22 1222   Thu Sep 01, 2022   0958 Symptoms started 24th  Passed out on the 26th [TR]   1012 SpO2: 99 % [TR]   1012 Temp: 98.7 °F (37.1 °C) [TR]   1012 Heart Rate: 79 [TR]   1100 EKG          EKG time: 1057  Rhythm/Rate: Normal sinus, rate 68  P waves and MI: Normal P, normal MI  QRS, axis: Narrow QRS, left axis  ST and T waves: No acute    Interpreted Contemporaneously by me, independently viewed  Not significantly changed compared to prior 12/17/2020   [TR]   1140 Orthostatics positive by blood pressure. [TR]   1214 I reviewed the work-up and findings with the patient at the bedside.  Answered all questions.  She states she was able to ambulate however she remains significantly dizzy.  I presume that her syncopal episodes were related to dehydration however with her persistent room spinning dizziness and evidence of prior ischemic insults to her brain on CT, plan to admit her to the hospital for neurology evaluation. [TR]   1219 Speaking with [place] with the observation unit.  She accepts for evaluation of syncope and persistent  vertiginous symptoms. [TR]      ED Course User Index  [TR] Nathan Reilly MD           PPE: The patient wore a mask and I wore an N95 mask throughout the entire patient encounter.  I wore a gown.      AS OF 12:22 EDT VITALS:    BP - (!) 192/91  HR - 72  TEMP - 98.7 °F (37.1 °C) (Tympanic)  O2 SATS - 97%        DIAGNOSIS  Final diagnoses:   Syncope, unspecified syncope type   Vertigo         DISPOSITION  ED Disposition     ED Disposition   Decision to Admit    Condition   --    Comment   --                Note Disclaimer: At [facility], we believe that sharing information builds trust and better relationships. You are receiving this note because you recently visited [facility]. It is possible you will see health information before a provider has talked with you about it. This kind of information can be easy to misunderstand. To help you fully understand what it means for your health, we urge you to discuss this note with your provider.       Nathan Reilly MD  09/01/22 6171

## 2022-09-01 NOTE — CONSULTS
DOS: 2022  NAME: Aliya Villaseñor   : 1945  PCP: Kike Yu MD  CC: Stroke  Referring MD: Juan Miguel Peñaloza MD    Neurological Problem and Interval History:  77 y.o. right-handed white female with a Hx of sudden onset of vertiginous symptoms that started this morning at home and she was extremely nauseated and throwing up and for that reason brought to the emergency room.  She was given a cocktail and currently feels a lot better.  She still feels slightly lightheaded when she stands up and for that reason she was brought in for observation.  She currently lives at home with her daughter and grandchildren.  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  She never passed out with these episodes.  Patient recently also had COVID and took a course of Paxlovid which tore up her stomach.    When I came to see her she was laying very comfortably in bed.  I performed at the bedside the Jeremy-Hallpike maneuver which did not show any abnormalities.  There is no evidence of any gaze nystagmus noted.  Her finger-to-nose coordination and heel-to-shin testing was normal and she was able to stand up by the side of the bed without any issues.  Her Romberg is negative and the De Leon balance test is also negative.    Past Medical/Surgical Hx:  Past Medical History:   Diagnosis Date   • Cancer (HCC)    • COPD (chronic obstructive pulmonary disease) (HCC)     [facility] COPD   • Diverticulitis    • Melanoma (HCC) 2019    right leg     Past Surgical History:   Procedure Laterality Date   • HYSTERECTOMY     • SKIN BIOPSY         Review of Systems:    Constitutional: Pleasant lady currently sitting up by the side of the bed in no distress.  She has lost a lot of weight involuntarily.  Cardiovascular: Denies any chest pain or palpitations.  Respiratory: No shortness of breath noted.  Gastrointestinal: No  nausea and vomiting noted.  However earlier she was nauseated.  Genitourinary: No bladder incontinence noted.  Musculoskeletal: No aches and pains in the muscles or joints noted.  Dermatological: No skin breakdown noted.  Neurological: No focal neurological deficits noted with NIH stroke scale of 0  Psychiatric: No underlying major anxiety or depression.  Ophthalmological: No visual changes noted.          Medications On Admission  Medications Prior to Admission   Medication Sig Dispense Refill Last Dose   • ALPRAZolam (XANAX) 0.5 MG tablet Take 0.5 mg by mouth.      • estradiol (ESTRACE) 2 MG tablet Take 2 mg by mouth Daily.      • Fluticasone-Salmeterol (Wixela Inhub) 250-50 MCG/ACT DISKUS       • Fluticasone-Umeclidin-Vilant (TRELEGY ELLIPTA IN) Inhale.      • carvedilol (COREG) 25 MG tablet Take 25 mg by mouth 2 (Two) Times a Day With Meals.      • loratadine (CLARITIN) 10 MG tablet Take 10 mg by mouth Daily.      • valsartan (DIOVAN) 80 MG tablet Take 160 mg by mouth Daily.          Allergies:  Allergies   Allergen Reactions   • Amlodipine Unknown - Low Severity   • Cephalosporins Unknown - Low Severity   • Ciprofloxacin Unknown - Low Severity   • Erythromycin Unknown - Low Severity   • Nefazodone Unknown - Low Severity   • Sulfamethoxazole-Trimethoprim Unknown - Low Severity   • Valsartan Unknown - Low Severity       Social Hx:  Social History     Socioeconomic History   • Marital status:        Family Hx:  History reviewed. No pertinent family history.    Review of Imaging (Interpretation of images not reports): I reviewed the CT of the head in details that shows the following:    FINDINGS: The brain and ventricles are symmetrical. There is no evidence  of hemorrhage, hydrocephalus or of abnormal extra-axial fluid. Decreased  attenuation involving the white matter of the cerebral hemispheres is  appreciated bilaterally consistent with moderate-to-severe small vessel  ischemic disease and scattered  lacunar infarcts involving the corona  radiata bilaterally. No focal area of decreased attenuation to suggest  acute infarction is identified.     IMPRESSION:  Small vessel ischemic disease and scattered lacunar infarcts  involving the corona radiata are noted bilaterally. There is no evidence  of acute infarction or hemorrhage. Further evaluation could be performed  with MRI examination of the brain as indicated.         Additional Tests Performed: Portable chest x-ray was reviewed that shows the following:    FINDINGS: There is atherosclerotic calcification of the aorta, heart  size within normal limits. No effusion or edema. Right lung is clear.  Vague 2.3 cm opacity demonstrated within the left mid lung zone, this  may simply represent superimposition of normal parenchymal markings,  ribs and scapula however nodule or focal airspace disease not excluded.  PA and lateral view of the chest would be the best means for further  evaluation when patient condition permits.      Laboratory Results:   Lab Results   Component Value Date    GLUCOSE 96 09/01/2022    CALCIUM 9.5 09/01/2022     09/01/2022    K 3.4 (L) 09/01/2022    CO2 30.6 (H) 09/01/2022     09/01/2022    BUN 11 09/01/2022    CREATININE 0.64 09/01/2022    EGFRIFNONA 76 12/17/2020    BCR 17.2 09/01/2022    ANIONGAP 10.4 09/01/2022     Lab Results   Component Value Date    WBC 6.95 09/01/2022    HGB 12.6 09/01/2022    HCT 37.9 09/01/2022    MCV 89.6 09/01/2022     09/01/2022     Lab Results   Component Value Date    CHOL 189 09/01/2022     Lab Results   Component Value Date    HDL 83 (H) 09/01/2022     Lab Results   Component Value Date    LDL 82 09/01/2022     Lab Results   Component Value Date    TRIG 140 09/01/2022     Lab Results   Component Value Date    HGBA1C 6.00 (H) 09/01/2022     No results found for: INR, PROTIME      Physical Examination:  BP (!) 189/83 (BP Location: Left arm, Patient Position: Lying)   Pulse 70   Temp 99.1 °F  "(37.3 °C) (Oral)   Resp 18   Ht 152.4 cm (60\")   Wt 42.5 kg (93 lb 9.6 oz)   SpO2 100%   BMI 18.28 kg/m²   General Appearance:   Well developed, well nourished, well groomed, alert, and cooperative.  HEENT: Normocephalic.  Normal Otoscopy.  Neck and Spine: Normal range of motion.  Normal alignment. No mass or tenderness. No bruits.  Cardiac: Regular rate and rhythm. No murmurs.  Peripheral Vasculature: Radial and pedal pulses are equal and symmetric. No signs of distal embolization.  Extremities:    No edema or deformities. Normal joint ROM. SHAHZAD hoses and SCD's in place  Skin:    No rashes or birth marks.    Neurological examination:  Higher Integrative  Function: Oriented to time, place and person. Normal registration, recall, attention span and concentration. Normal language including comprehension, spontaneous speech, repetition, reading, writing, naming and vocabulary. No neglect with normal visual-spatial function and construction. Normal fund of knowledge and higher integrative function.  CN II: Pupils are equal, round, and reactive to light. Normal visual acuity and visual fields.    CN III IV VI: Extraocular movements are full without nystagmus.   CN V: Normal facial sensation and strength of muscles of mastication.  CN VII: Facial movements are symmetric. No weakness.  CN VIII:   Auditory acuity is normal.  CN IX & X:   Symmetric palatal movement.  CN XI: Sternocleidomastoid and trapezius are normal.  No weakness.  CN XII:   The tongue is midline.  No atrophy or fasciculations.  Motor: Normal muscle strength, bulk and tone in upper and lower extremities.  No fasciculations, rigidity, spasticity, or abnormal movements.  Reflexes: 2+ in the upper and lower extremities. Plantar responses are flexor.  Sensation: Normal to light touch, pinprick, vibration, temperature, and proprioception in arms and legs. Normal graphesthesia and no extinction on DSS.  Station and Gait: Normal gait and station.  The Romberg " is negative and the De Leon balance test is negative  Coordination: Finger to nose test shows no dysmetria.   Heel to shin normal.        Diagnoses / Discussion:  77 y.o. who presents with Sx of benign positional vertigo following a bout of COVID related upper respiratory tract infection.  Plan:  MRI  with and without contrast with bilateral internal auditory canals pending..    Epley maneuver for tomorrow.    Scopolamine patch 1.5 mg to change every 72 hours if needed.    Discussed about nutritional consultation if she continues to lose weight.    She will be getting a CT of the chest with contrast to evaluate the pulmonary nodule.    I have discussed the above with the patient and family.  Time spent with patient: 60min    Coding    Dictated using Dragon dictation.

## 2022-09-01 NOTE — ED TRIAGE NOTES
Pt states that she was diagnosed with COVID 1 week ago. Complains of dizziness now. States that she had a syncopal episode last week.    Patient masked at arrival and triage staff wore all appropriate PPE during entire encounter with patient.

## 2022-09-02 ENCOUNTER — APPOINTMENT (OUTPATIENT)
Dept: CARDIOLOGY | Facility: HOSPITAL | Age: 77
End: 2022-09-02

## 2022-09-02 ENCOUNTER — READMISSION MANAGEMENT (OUTPATIENT)
Dept: CALL CENTER | Facility: HOSPITAL | Age: 77
End: 2022-09-02

## 2022-09-02 VITALS
OXYGEN SATURATION: 100 % | BODY MASS INDEX: 18.26 KG/M2 | HEIGHT: 60 IN | RESPIRATION RATE: 16 BRPM | SYSTOLIC BLOOD PRESSURE: 137 MMHG | DIASTOLIC BLOOD PRESSURE: 81 MMHG | WEIGHT: 93 LBS | TEMPERATURE: 98 F | HEART RATE: 58 BPM

## 2022-09-02 LAB
ANION GAP SERPL CALCULATED.3IONS-SCNC: 7.1 MMOL/L (ref 5–15)
AORTIC DIMENSIONLESS INDEX: 0.7 (DI)
BH CV ECHO MEAS - AO MAX PG: 8 MMHG
BH CV ECHO MEAS - AO MEAN PG: 3.7 MMHG
BH CV ECHO MEAS - AO V2 MAX: 141.2 CM/SEC
BH CV ECHO MEAS - AO V2 VTI: 24.7 CM
BH CV ECHO MEAS - AVA(I,D): 1.61 CM2
BH CV ECHO MEAS - EDV(CUBED): 42.2 ML
BH CV ECHO MEAS - EDV(MOD-SP2): 42 ML
BH CV ECHO MEAS - EDV(MOD-SP4): 53 ML
BH CV ECHO MEAS - EF(MOD-BP): 66.5 %
BH CV ECHO MEAS - EF(MOD-SP2): 61.9 %
BH CV ECHO MEAS - EF(MOD-SP4): 69.8 %
BH CV ECHO MEAS - ESV(CUBED): 6.5 ML
BH CV ECHO MEAS - ESV(MOD-SP2): 16 ML
BH CV ECHO MEAS - ESV(MOD-SP4): 16 ML
BH CV ECHO MEAS - FS: 46.5 %
BH CV ECHO MEAS - IVS/LVPW: 1.2 CM
BH CV ECHO MEAS - IVSD: 1.11 CM
BH CV ECHO MEAS - LAT PEAK E' VEL: 8.2 CM/SEC
BH CV ECHO MEAS - LV MASS(C)D: 106 GRAMS
BH CV ECHO MEAS - LV MAX PG: 3.1 MMHG
BH CV ECHO MEAS - LV MEAN PG: 1.59 MMHG
BH CV ECHO MEAS - LV V1 MAX: 88.7 CM/SEC
BH CV ECHO MEAS - LV V1 VTI: 18 CM
BH CV ECHO MEAS - LVIDD: 3.5 CM
BH CV ECHO MEAS - LVIDS: 1.86 CM
BH CV ECHO MEAS - LVOT AREA: 2.2 CM2
BH CV ECHO MEAS - LVOT DIAM: 1.67 CM
BH CV ECHO MEAS - LVPWD: 0.93 CM
BH CV ECHO MEAS - MED PEAK E' VEL: 6.3 CM/SEC
BH CV ECHO MEAS - MV A DUR: 0.12 SEC
BH CV ECHO MEAS - MV A MAX VEL: 88.3 CM/SEC
BH CV ECHO MEAS - MV DEC SLOPE: 435.3 CM/SEC2
BH CV ECHO MEAS - MV DEC TIME: 0.28 MSEC
BH CV ECHO MEAS - MV E MAX VEL: 80.1 CM/SEC
BH CV ECHO MEAS - MV E/A: 0.91
BH CV ECHO MEAS - MV MAX PG: 4.5 MMHG
BH CV ECHO MEAS - MV MEAN PG: 1.81 MMHG
BH CV ECHO MEAS - MV P1/2T: 75.8 MSEC
BH CV ECHO MEAS - MV V2 VTI: 25.9 CM
BH CV ECHO MEAS - MVA(P1/2T): 2.9 CM2
BH CV ECHO MEAS - MVA(VTI): 1.53 CM2
BH CV ECHO MEAS - PA ACC TIME: 0.05 SEC
BH CV ECHO MEAS - PA PR(ACCEL): 57.6 MMHG
BH CV ECHO MEAS - PA V2 MAX: 46.7 CM/SEC
BH CV ECHO MEAS - PULM A REVS DUR: 0.13 SEC
BH CV ECHO MEAS - PULM A REVS VEL: 35.1 CM/SEC
BH CV ECHO MEAS - PULM DIAS VEL: 32.1 CM/SEC
BH CV ECHO MEAS - PULM S/D: 1.55
BH CV ECHO MEAS - PULM SYS VEL: 49.7 CM/SEC
BH CV ECHO MEAS - RAP SYSTOLE: 3 MMHG
BH CV ECHO MEAS - RVSP: 16 MMHG
BH CV ECHO MEAS - SV(LVOT): 39.7 ML
BH CV ECHO MEAS - SV(MOD-SP2): 26 ML
BH CV ECHO MEAS - SV(MOD-SP4): 37 ML
BH CV ECHO MEAS - TAPSE (>1.6): 2.9 CM
BH CV ECHO MEAS - TR MAX PG: 13.4 MMHG
BH CV ECHO MEAS - TR MAX VEL: 183.2 CM/SEC
BH CV ECHO MEASUREMENTS AVERAGE E/E' RATIO: 11.05
BH CV XLRA - TDI S': 14.4 CM/SEC
BH CV XLRA MEAS LEFT DIST CCA EDV: 11.9 CM/SEC
BH CV XLRA MEAS LEFT DIST CCA PSV: 73.3 CM/SEC
BH CV XLRA MEAS LEFT DIST ICA EDV: -16.5 CM/SEC
BH CV XLRA MEAS LEFT DIST ICA PSV: -86.7 CM/SEC
BH CV XLRA MEAS LEFT ICA/CCA RATIO: 1.38
BH CV XLRA MEAS LEFT MID ICA EDV: -19.2 CM/SEC
BH CV XLRA MEAS LEFT MID ICA PSV: -101 CM/SEC
BH CV XLRA MEAS LEFT PROX CCA EDV: 11.9 CM/SEC
BH CV XLRA MEAS LEFT PROX CCA PSV: 71.6 CM/SEC
BH CV XLRA MEAS LEFT PROX ECA EDV: -5.7 CM/SEC
BH CV XLRA MEAS LEFT PROX ECA PSV: -70.7 CM/SEC
BH CV XLRA MEAS LEFT PROX ICA EDV: -17.2 CM/SEC
BH CV XLRA MEAS LEFT PROX ICA PSV: -78.1 CM/SEC
BH CV XLRA MEAS LEFT PROX SCLA PSV: 108.7 CM/SEC
BH CV XLRA MEAS LEFT VERTEBRAL A EDV: -12.1 CM/SEC
BH CV XLRA MEAS LEFT VERTEBRAL A PSV: -91.1 CM/SEC
BH CV XLRA MEAS RIGHT DIST CCA EDV: -14.3 CM/SEC
BH CV XLRA MEAS RIGHT DIST CCA PSV: -79.5 CM/SEC
BH CV XLRA MEAS RIGHT DIST ICA EDV: -16.2 CM/SEC
BH CV XLRA MEAS RIGHT DIST ICA PSV: -80.8 CM/SEC
BH CV XLRA MEAS RIGHT ICA/CCA RATIO: 1.25
BH CV XLRA MEAS RIGHT MID ICA EDV: 17.4 CM/SEC
BH CV XLRA MEAS RIGHT MID ICA PSV: 99.4 CM/SEC
BH CV XLRA MEAS RIGHT PROX CCA EDV: 9.3 CM/SEC
BH CV XLRA MEAS RIGHT PROX CCA PSV: 78.9 CM/SEC
BH CV XLRA MEAS RIGHT PROX ECA EDV: -8.1 CM/SEC
BH CV XLRA MEAS RIGHT PROX ECA PSV: -89.5 CM/SEC
BH CV XLRA MEAS RIGHT PROX ICA EDV: -16.8 CM/SEC
BH CV XLRA MEAS RIGHT PROX ICA PSV: -78.3 CM/SEC
BH CV XLRA MEAS RIGHT PROX SCLA PSV: 172.8 CM/SEC
BH CV XLRA MEAS RIGHT VERTEBRAL A EDV: 8.8 CM/SEC
BH CV XLRA MEAS RIGHT VERTEBRAL A PSV: 29.9 CM/SEC
BUN SERPL-MCNC: 6 MG/DL (ref 8–23)
BUN/CREAT SERPL: 12.5 (ref 7–25)
CALCIUM SPEC-SCNC: 8.4 MG/DL (ref 8.6–10.5)
CHLORIDE SERPL-SCNC: 106 MMOL/L (ref 98–107)
CO2 SERPL-SCNC: 27.9 MMOL/L (ref 22–29)
CREAT SERPL-MCNC: 0.48 MG/DL (ref 0.57–1)
DEPRECATED RDW RBC AUTO: 39.1 FL (ref 37–54)
EGFRCR SERPLBLD CKD-EPI 2021: 98.3 ML/MIN/1.73
ERYTHROCYTE [DISTWIDTH] IN BLOOD BY AUTOMATED COUNT: 12 % (ref 12.3–15.4)
GLUCOSE SERPL-MCNC: 77 MG/DL (ref 65–99)
HCT VFR BLD AUTO: 33.2 % (ref 34–46.6)
HGB BLD-MCNC: 11.2 G/DL (ref 12–15.9)
LEFT ATRIUM VOLUME INDEX: 23.3 ML/M2
LV EF 2D ECHO EST: 67 %
MAXIMAL PREDICTED HEART RATE: 144 BPM
MAXIMAL PREDICTED HEART RATE: 144 BPM
MCH RBC QN AUTO: 30.3 PG (ref 26.6–33)
MCHC RBC AUTO-ENTMCNC: 33.7 G/DL (ref 31.5–35.7)
MCV RBC AUTO: 89.7 FL (ref 79–97)
PLATELET # BLD AUTO: 275 10*3/MM3 (ref 140–450)
PMV BLD AUTO: 10.4 FL (ref 6–12)
POTASSIUM SERPL-SCNC: 3.1 MMOL/L (ref 3.5–5.2)
RBC # BLD AUTO: 3.7 10*6/MM3 (ref 3.77–5.28)
SODIUM SERPL-SCNC: 141 MMOL/L (ref 136–145)
STRESS TARGET HR: 122 BPM
STRESS TARGET HR: 122 BPM
WBC NRBC COR # BLD: 5.11 10*3/MM3 (ref 3.4–10.8)

## 2022-09-02 PROCEDURE — G0378 HOSPITAL OBSERVATION PER HR: HCPCS

## 2022-09-02 PROCEDURE — 85027 COMPLETE CBC AUTOMATED: CPT | Performed by: PHYSICIAN ASSISTANT

## 2022-09-02 PROCEDURE — 99214 OFFICE O/P EST MOD 30 MIN: CPT | Performed by: NURSE PRACTITIONER

## 2022-09-02 PROCEDURE — 93306 TTE W/DOPPLER COMPLETE: CPT | Performed by: INTERNAL MEDICINE

## 2022-09-02 PROCEDURE — 25010000002 PERFLUTREN (DEFINITY) 8.476 MG IN SODIUM CHLORIDE (PF) 0.9 % 10 ML INJECTION: Performed by: STUDENT IN AN ORGANIZED HEALTH CARE EDUCATION/TRAINING PROGRAM

## 2022-09-02 PROCEDURE — 93880 EXTRACRANIAL BILAT STUDY: CPT

## 2022-09-02 PROCEDURE — 97161 PT EVAL LOW COMPLEX 20 MIN: CPT

## 2022-09-02 PROCEDURE — 99204 OFFICE O/P NEW MOD 45 MIN: CPT | Performed by: INTERNAL MEDICINE

## 2022-09-02 PROCEDURE — 97112 NEUROMUSCULAR REEDUCATION: CPT

## 2022-09-02 PROCEDURE — 80048 BASIC METABOLIC PNL TOTAL CA: CPT | Performed by: PHYSICIAN ASSISTANT

## 2022-09-02 PROCEDURE — 97535 SELF CARE MNGMENT TRAINING: CPT

## 2022-09-02 PROCEDURE — 97165 OT EVAL LOW COMPLEX 30 MIN: CPT

## 2022-09-02 PROCEDURE — 93246 EXT ECG>7D<15D RECORDING: CPT

## 2022-09-02 PROCEDURE — 93306 TTE W/DOPPLER COMPLETE: CPT

## 2022-09-02 RX ORDER — ASPIRIN 81 MG/1
81 TABLET, CHEWABLE ORAL DAILY
Status: DISCONTINUED | OUTPATIENT
Start: 2022-09-02 | End: 2022-09-02 | Stop reason: HOSPADM

## 2022-09-02 RX ORDER — CLOPIDOGREL BISULFATE 75 MG/1
75 TABLET ORAL DAILY
Qty: 21 TABLET | Refills: 0 | Status: SHIPPED | OUTPATIENT
Start: 2022-09-02 | End: 2022-10-20

## 2022-09-02 RX ORDER — CLOPIDOGREL BISULFATE 75 MG/1
75 TABLET ORAL DAILY
Status: DISCONTINUED | OUTPATIENT
Start: 2022-09-02 | End: 2022-09-02 | Stop reason: HOSPADM

## 2022-09-02 RX ORDER — ATORVASTATIN CALCIUM 40 MG/1
40 TABLET, FILM COATED ORAL DAILY
Qty: 90 TABLET | Refills: 1 | Status: SHIPPED | OUTPATIENT
Start: 2022-09-03

## 2022-09-02 RX ORDER — POTASSIUM CHLORIDE 750 MG/1
30 TABLET, FILM COATED, EXTENDED RELEASE ORAL
Status: COMPLETED | OUTPATIENT
Start: 2022-09-02 | End: 2022-09-02

## 2022-09-02 RX ORDER — ASPIRIN 81 MG/1
81 TABLET ORAL DAILY
Qty: 90 TABLET | Refills: 1 | Status: SHIPPED | OUTPATIENT
Start: 2022-09-02 | End: 2022-11-07 | Stop reason: HOSPADM

## 2022-09-02 RX ADMIN — ESTRADIOL 2 MG: 2 TABLET ORAL at 10:33

## 2022-09-02 RX ADMIN — CARVEDILOL 25 MG: 25 TABLET, FILM COATED ORAL at 10:32

## 2022-09-02 RX ADMIN — PERFLUTREN 2 ML: 6.52 INJECTION, SUSPENSION INTRAVENOUS at 10:24

## 2022-09-02 RX ADMIN — ACETAMINOPHEN 650 MG: 325 TABLET, FILM COATED ORAL at 07:41

## 2022-09-02 RX ADMIN — CLOPIDOGREL 75 MG: 75 TABLET, FILM COATED ORAL at 10:33

## 2022-09-02 RX ADMIN — ASPIRIN 81 MG: 81 TABLET, CHEWABLE ORAL at 10:32

## 2022-09-02 RX ADMIN — Medication 10 ML: at 10:34

## 2022-09-02 RX ADMIN — ATORVASTATIN CALCIUM 40 MG: 20 TABLET, FILM COATED ORAL at 10:32

## 2022-09-02 RX ADMIN — POTASSIUM CHLORIDE 30 MEQ: 750 TABLET, EXTENDED RELEASE ORAL at 07:41

## 2022-09-02 RX ADMIN — POTASSIUM CHLORIDE 30 MEQ: 750 TABLET, EXTENDED RELEASE ORAL at 11:47

## 2022-09-02 RX ADMIN — VALSARTAN 160 MG: 160 TABLET, FILM COATED ORAL at 10:33

## 2022-09-02 RX ADMIN — CETIRIZINE HYDROCHLORIDE 10 MG: 10 TABLET ORAL at 10:33

## 2022-09-02 NOTE — PROGRESS NOTES
"Nutrition Services    Patient Name:  Aliya Villaseñor  YOB: 1945  MRN: [id number]  Admit Date:  9/1/2022      PROGRESS NOTE - CLINICAL NUTRITION    Comments: Nutrition consult for chronic poor po. Pt here with CVA, covid -19 and new lung mass.  Weight appears fairly stable. Was 95 lb at the MD Office a year ago. Eatig n100% of meals per RN.     Will go ahead and add some boost plus with meals to help promote some weight gain as her BMI is only 18. Follow clinical course and support her nutrition needs.    Encounter Information         Reason for Encounter Chronic poor po    Admitting Diagnosis CVA, Covid-19    Current Issues Unexplained wt loss     Current Nutrition Orders & Evaluation of Intake       Oral Nutrition     Current PO Diet Diet Regular; Cardiac   Supplement    PO Evaluation     Trending % PO Intake 100% per RN   --  Anthropometrics          Height    Weight Height: 152.4 cm (60\")  Weight: 42.2 kg (93 lb) (09/02/22 1023)    BMI kg/m2 Body mass index is 18.16 kg/m².        Weight Trend     Weight History  Wt Readings from Last 15 Encounters:   09/02/22 1023 42.2 kg (93 lb)   09/01/22 1329 42.5 kg (93 lb 9.6 oz)   09/01/22 1504 42.2 kg (93 lb)        Labs        Pertinent Labs Reviewed, listed below     Results from last 7 days   Lab Units 09/02/22  0505 09/01/22  1013   SODIUM mmol/L 141 141   POTASSIUM mmol/L 3.1* 3.4*   CHLORIDE mmol/L 106 100   CO2 mmol/L 27.9 30.6*   BUN mg/dL 6* 11   CREATININE mg/dL 0.48* 0.64   CALCIUM mg/dL 8.4* 9.5   BILIRUBIN mg/dL  --  0.3   ALK PHOS U/L  --  118*   ALT (SGPT) U/L  --  22   AST (SGOT) U/L  --  20   GLUCOSE mg/dL 77 96     Results from last 7 days   Lab Units 09/02/22  0505 09/01/22  1013   HEMOGLOBIN g/dL 11.2* 12.6   HEMATOCRIT % 33.2* 37.9   WBC 10*3/mm3 5.11 6.95   TRIGLYCERIDES mg/dL  --  140     Results from last 7 days   Lab Units 09/02/22  0505 09/01/22  1013   PLATELETS 10*3/mm3 275 336     COVID19   Date Value Ref Range Status   09/01/2022 " Detected (C) Not Detected - Ref. Range Final     Lab Results   Component Value Date    HGBA1C 6.00 (H) 09/01/2022          Medications            Scheduled Medications aspirin, 81 mg, Oral, Daily  atorvastatin, 40 mg, Oral, Daily  budesonide-formoterol, 2 puff, Inhalation, BID - RT   And  tiotropium bromide monohydrate, 1 puff, Inhalation, Daily - RT  carvedilol, 25 mg, Oral, BID With Meals  cetirizine, 10 mg, Oral, Daily  clopidogrel, 75 mg, Oral, Daily  estradiol, 2 mg, Oral, Daily  Scopolamine, 1 patch, Transdermal, Q72H  sodium chloride, 10 mL, Intravenous, Q12H  valsartan, 160 mg, Oral, BID        Infusions lactated ringers, 100 mL/hr, Last Rate: 100 mL/hr (09/01/22 2217)        PRN Medications •  acetaminophen **OR** acetaminophen **OR** acetaminophen  •  ALPRAZolam  •  melatonin  •  nitroglycerin  •  ondansetron **OR** ondansetron  •  [COMPLETED] Insert peripheral IV **AND** sodium chloride  •  sodium chloride     Physical Findings         Physical Appearance Underweight, frail   --   Gastrointestinal wnl    Skin intact   --  Intervention Goal        Intervention Goal(s) Maintain nutrition status, Tolerate PO , Maintain intake and Appropriate weight gain     Nutrition Intervention        RD Action Supplement provided, Encourage intake, Follow Tx Progress, Care plan reviewed and Recommend/ordered:      Nutrition Prescription          Diet Prescription     Supplement Prescription Boost plus with meals   EN/PN Prescription     Prescription Ordered yes     Monitor/Evaluation        Monitor Per protocol     RD to follow up per protocol.    Electronically signed by:  Maureen Keyes RD  09/02/22 14:53 EDT    Electronically signed by:  Maureen Keyes RD  09/02/22 14:52 EDT

## 2022-09-02 NOTE — OUTREACH NOTE
Prep Survey    Flowsheet Row Responses   [facility]? [place]   Is LACE score < 7 ? Yes   Emergency Room discharge w/ pulse ox? No   Eligibility Readm Mgmt   Discharge diagnosis CVA COVID19 Detected     Does the patient have one of the following disease processes/diagnoses(primary or secondary)? COVID-19   Does the patient have Home health ordered? No   Is there a DME ordered? No   Prep survey completed? Yes          JYOTI LYLE - Registered Nurse

## 2022-09-02 NOTE — PLAN OF CARE
Goal Outcome Evaluation:  Plan of Care Reviewed With: patient, daughter     Outcome Evaluation: Patient is a 77 y.o female who presents to [facility] obs with reports of dizziness. Currently COVID+. She lives with her family, (I) with ADLs and fxl mobility w/o AD, working full time. She presents today with c/o dizziness but much improved. She is (I)-Hilary with bed mobility, fxl ambulation, LB dressing and toilet TFs upon assessment. TF to bedside chair and feeding (I). No OT needs identified at this time, will sign off and rec d/c home    Hand hygiene completed before and after session. Appropriate PPE worn t/o including N-95, face shield and gown/gloves

## 2022-09-02 NOTE — CONSULTS
Date of Hospital Visit: 2022  Date of consult: 22  Encounter Provider: Rd Palmer MD  Place of Service: [facility] CARDIOLOGY  Patient Name: Aliya Villaseñor  :1945  Referral Provider: Juan Miguel Peñaloza MD    Chief complaint dizziness     History of Present Illness Aliya Villaseñor is a 77 year old pt of [facility] with a history of HTN, anxiety, allergies, COPD and melanoma.     Pt was seen in the ER on  with ABD pain, vomiting and diarrhea.     Pt was seen by Dr. Kammmerling on 21 for multiple dizzy spells when walking. Dr. Kemmerling reviewed notes from her primary care provider, Dr. Yu which showed blood pressure readings of 120/80 on 2021, on 2021 110/70, and on 5/3 100/60. Apparently Ms. Villaseñor reported noting higher blood pressure readings at home Pt was instructed on proper techniques for taking her blood pressure and was asked to continue to monitor her BP at home and to call the office with results. No changes were made to her medical regimen. An ECHO then showed  overall normal left ventricular systolic function with an EF of 62%, there was mild tricuspid regurgitation as well as mild to moderate mitral regurgitation as well as mild aortic regurgitation. Clonidine 0.1 mg twice a day was added as needed for SBP greater than 160,         Pt was seen by [facility] on 10/26/21 for follow up of her HTN. Pt reported she has noticed higher readings when she goes to provider appointments. She PCP was concerned about a couple of higher readings in 190s to 200s. Pt reports sometimes she has been rushed to get to her appointments and has not taken her BP medications prior to the appointments. She reported a tremendous amount of stress with her job. Pt denied any chest pain, orthopnea, PND, edema, dizziness, or lightheadedness. Pt was to monitor her BP 2 hours after taking her medication.     Pt presented to ER on 22 with complaints of dizziness. She  reported she developed symptoms of COVID on 8/24/22 and tested positive. She passed out on 8/26. Per her daughter he eyes rolled back into her head but she did not have any shaking movements. Pt reported she has been experiencing dizziness for a long juanis and she describes it as the room spinning. Pt reported she has not been eating or drinking as much. She rep[orted her PCP put her on paxloid and she took 3 days of therapy before stopping it because it made her feel ill. Pt denied any chest pain or shortness of breath. Pt reported no fevers in the past few days. In ER, K 3.4, troponin negative, CBC unremarkable, UA negative for nitrates, CT pf head showed Small vessel ischemic disease and scattered lacunar infarcts involving the corona radiata are noted bilaterally. There is no evidence of acute infarction or hemorrhage. CXR  showed There is atherosclerotic calcification of the aorta, heart size within normal limits. No effusion or edema. Right lung is clear. Vague 2.3 cm opacity demonstrated within the left mid lung zone, this may simply represent superimposition of normal parenchymal markings, ribs and scapula however nodule or focal airspace disease not excluded, EKG showed NSR 68, Pt was orthostatic in ER.         I have been asked to see for syncope.           ECHO 8/24/21            Holter 8/24/21    Study Conclusions: This 24-hour Holter monitor revealed an underlying sinus rhythm with some sinus bradycardia.  Heart rates ranged from 52-92 with an average of 70 bpm.  There were 71 reported PVCs with no ventricular tachycardia.  There were 93   reported PACs with 3 atrial pairs and a slow 7 beat run of irregular nonsustained atrial tachycardia.  There were no sustained bradyarrhythmias or tachyarrhythmias noted.  No symptoms were recorded      Past Medical History:   Diagnosis Date   • Cancer (HCC)    • COPD (chronic obstructive pulmonary disease) (HCC) 2020    betty knight COPD   • Diverticulitis 2022   •  Melanoma (HCC) 2019    right leg       Past Surgical History:   Procedure Laterality Date   • HYSTERECTOMY     • SKIN BIOPSY         Medications Prior to Admission   Medication Sig Dispense Refill Last Dose   • ALPRAZolam (XANAX) 0.5 MG tablet Take 0.5 mg by mouth.   8/31/2022 at Unknown time   • carvedilol (COREG) 25 MG tablet Take 25 mg by mouth 2 (Two) Times a Day With Meals.   9/1/2022 at Unknown time   • estradiol (ESTRACE) 2 MG tablet Take 2 mg by mouth Daily.   9/1/2022 at Unknown time   • Fluticasone-Salmeterol (ADVAIR/WIXELA) 250-50 MCG/ACT DISKUS    9/1/2022 at Unknown time   • Fluticasone-Umeclidin-Vilant (TRELEGY ELLIPTA IN) Inhale.   9/1/2022 at Unknown time   • loratadine (CLARITIN) 10 MG tablet Take 10 mg by mouth Daily.   9/1/2022 at Unknown time   • valsartan (DIOVAN) 80 MG tablet Take 160 mg by mouth 2 (Two) Times a Day.   9/1/2022 at Unknown time       Current Meds  Scheduled Meds:aspirin, 81 mg, Oral, Daily  atorvastatin, 40 mg, Oral, Daily  budesonide-formoterol, 2 puff, Inhalation, BID - RT   And  tiotropium bromide monohydrate, 1 puff, Inhalation, Daily - RT  carvedilol, 25 mg, Oral, BID With Meals  cetirizine, 10 mg, Oral, Daily  clopidogrel, 75 mg, Oral, Daily  estradiol, 2 mg, Oral, Daily  potassium chloride, 30 mEq, Oral, Q2H  Scopolamine, 1 patch, Transdermal, Q72H  sodium chloride, 10 mL, Intravenous, Q12H  valsartan, 160 mg, Oral, BID      Continuous Infusions:lactated ringers, 100 mL/hr, Last Rate: 100 mL/hr (09/01/22 2217)      PRN Meds:.•  acetaminophen **OR** acetaminophen **OR** acetaminophen  •  ALPRAZolam  •  melatonin  •  nitroglycerin  •  ondansetron **OR** ondansetron  •  [COMPLETED] Insert peripheral IV **AND** sodium chloride  •  sodium chloride    Allergies as of 09/01/2022 - Reviewed 09/01/2022   Allergen Reaction Noted   • Amlodipine Unknown - Low Severity 02/28/2012   • Cephalosporins Unknown - Low Severity 02/28/2012   • Ciprofloxacin Unknown - Low Severity 02/28/2012  "  • Erythromycin Unknown - Low Severity 02/28/2012   • Nefazodone Unknown - Low Severity 02/28/2012   • Sulfamethoxazole-trimethoprim Unknown - Low Severity 02/28/2012   • Valsartan Unknown - Low Severity 02/28/2012       Social History     Socioeconomic History   • Marital status:    Tobacco Use   • Smoking status: Former Smoker     Years: 20.00     Types: Cigarettes   Vaping Use   • Vaping Use: Former   Substance and Sexual Activity   • Alcohol use: Never       History reviewed. No pertinent family history.    REVIEW OF SYSTEMS:   All systems reviewed and pertinent positives include in HPI otherwise negative review of systems.       Objective:   Temp:  [97.7 °F (36.5 °C)-99.1 °F (37.3 °C)] 98.1 °F (36.7 °C)  Heart Rate:  [58-72] 58  Resp:  [16-18] 16  BP: (114-195)/() 118/68  Body mass index is 18.16 kg/m².  Flowsheet Rows    Flowsheet Row First Filed Value   Admission Height 152.4 cm (60\") Documented at 09/01/2022 1329   Admission Weight 42.5 kg (93 lb 9.6 oz) Documented at 09/01/2022 1329        Vitals:    09/02/22 1023   BP: 118/68   Pulse: 58   Resp:    Temp:    SpO2:        General Appearance:    Alert, cooperative, in no acute distress   Head:    Normocephalic, without obvious abnormality, atraumatic   Eyes:            Lids and lashes normal, conjunctivae and sclerae normal, no   icterus, no pallor, corneas clear, PERRLA   Ears:    Ears appear intact with no abnormalities noted   Throat:   No oral lesions, no thrush, oral mucosa moist   Neck:   No adenopathy, supple, trachea midline, no thyromegaly, no   carotid bruit, no JVD   Back:     No kyphosis present, no scoliosis present, no skin lesions, erythema or scars, no tenderness to percussion or palpation, range of motion normal   Lungs:     Clear to auscultation,respirations regular, even and unlabored    Heart:    Regular rhythm and normal rate, normal S1 and S2, no murmur, no gallop, no rub, no click   Chest Wall:    No abnormalities observed "   Abdomen:     Normal bowel sounds, no masses, no organomegaly, soft nontender, nondistended, no guarding, no rebound  tenderness   Extremities:   Moves all extremities well, no edema, no cyanosis, no redness   Pulses:   Pulses palpable and equal bilaterally. Normal radial, carotid, femoral, dorsalis pedis and posterior tibial pulses bilaterally. Normal abdominal aorta   Skin:  Neurology:   Psychiatric:   No bleeding, bruising or rash   Normal speech and cranial nerve exam, no focal deficit   Alert and oriented x 3, normal mood and affect                 Review of Data:      Results from last 7 days   Lab Units 09/02/22  0505 09/01/22  1013   SODIUM mmol/L 141 141   POTASSIUM mmol/L 3.1* 3.4*   CHLORIDE mmol/L 106 100   CO2 mmol/L 27.9 30.6*   BUN mg/dL 6* 11   CREATININE mg/dL 0.48* 0.64   CALCIUM mg/dL 8.4* 9.5   BILIRUBIN mg/dL  --  0.3   ALK PHOS U/L  --  118*   ALT (SGPT) U/L  --  22   AST (SGOT) U/L  --  20   GLUCOSE mg/dL 77 96     Results from last 7 days   Lab Units 09/01/22  1013   TROPONIN T ng/mL <0.010     @LABRCNTbnp@  Results from last 7 days   Lab Units 09/02/22  0505 09/01/22  1013   WBC 10*3/mm3 5.11 6.95   HEMOGLOBIN g/dL 11.2* 12.6   HEMATOCRIT % 33.2* 37.9   PLATELETS 10*3/mm3 275 336             @LABRCNTIP(chol,trig,hdl,ldl)              I personally viewed and interpreted the patient's EKG/Telemetry data  )  Patient Active Problem List   Diagnosis   • Dizziness   • CVA (cerebral vascular accident) ([facility])     0924-143/70 (89) 0927-118/68 (83)    Assessment and Plan:    Ms. Villaseñor is a 77 years old lady was brought to the ER for evaluation of orthostatic dizziness of several days duration.  She was diagnosed with COVID on 8/24/2022 and has been feeling weak with decreased appetite since then.  She had 2 episodes of syncopal attack that lasted for few seconds that was preceded by lightheadedness.  First episode occurred when she got up to use the restroom and second one  happened days later when  she was up on her feet.  She admits she has decreased oral intake.  Orthostatic vital signs checked in the ER: Lying down blood pressure was 143/70 and standing dropped to 118/68.  Patient felt lightheaded.    She denies any significant chest pain, shortness of breath, orthopnea, PND, palpitations.  No prior known heart disease.    1.  Syncopal/presyncopal episode from orthostatic hypotension.  Likely from decreased oral intake/recovering from COVID.  No significant complications from COVID otherwise.  2.  Essential hypertension-on Coreg and valsartan at home.  Blood pressure running normal this morning.  Hold off on the potential for now.  3.  COPD-stable  4.  Hypokalemia-replete    Normal echocardiogram.  Agree with IV fluid hydration with normal saline and holding antihypertensives.      Continue IV fluid hydration.  Recommend orthostatic precautions, adequate oral hydration.  Thigh-high compression stocking if symptoms persist.  Hold BP meds and restart if blood pressures persistently above 140/90 mmHg.    MRI reported small left coronary radiata infarct.  Neurology eval pending.          Rd Palmer MD  09/02/22  10:23 EDT.  Time spent in reviewing chart, discussion and examination:

## 2022-09-02 NOTE — PLAN OF CARE
Goal Outcome Evaluation:              Outcome Evaluation: patient left observation unit at th time via prvate vehicle with all her belongings, discharge summary provided and education included, lewis is aware to follow up with primary care and to follow up with neurology gray 3 months, new medicaton sent over pharmacy of choice and patient aware, patient had no questions at the time of discharge.

## 2022-09-02 NOTE — PLAN OF CARE
Goal Outcome Evaluation:  Patient admitted to observation unit for evaluation of dizziness and syncope. Enhanced droplet precautions due to recent positive COVID infection. Alert and oriented x4. Patient reported 8/10 pain overnight. See MAR for intervention. BP elevated at 189/83 on admission. See MAR for intervention. MRI completed. VSS per chart. LR @100ml/hr. NSR on tele. No acute events to report.

## 2022-09-02 NOTE — THERAPY EVALUATION
Patient Name: Aliya Villaseñor  : 1945    MRN: [id number]                              Today's Date: 2022       Admit Date: 2022    Visit Dx:     ICD-10-CM ICD-9-CM   1. Syncope, unspecified syncope type  R55 780.2   2. Vertigo  R42 780.4   3. Left upper lobe pulmonary nodule  R91.1 793.11     Patient Active Problem List   Diagnosis   • Dizziness   • CVA (cerebral vascular accident) (HCC)     Past Medical History:   Diagnosis Date   • Cancer (HCC)    • COPD (chronic obstructive pulmonary disease) (HCC)     betty knight COPD   • Diverticulitis    • Melanoma (HCC) 2019    right leg     Past Surgical History:   Procedure Laterality Date   • HYSTERECTOMY     • SKIN BIOPSY        General Information     Row Name 22 1254          OT Time and Intention    Document Type evaluation;discharge evaluation/summary  -     Mode of Treatment occupational therapy  -     Row Name 22 1254          General Information    Patient Profile Reviewed yes  -JW     Prior Level of Function independent:;ADL's;all household mobility  indep with ADLs and fxl mobility w/o Ad  -     Existing Precautions/Restrictions no known precautions/restrictions  -     Barriers to Rehab none identified  -     Row Name 22 1254          Living Environment    People in Home child(rudy), adult;grandchild(rudy)  -     Row Name 22 1254          Cognition    Orientation Status (Cognition) oriented x 4  -           User Key  (r) = Recorded By, (t) = Taken By, (c) = Cosigned By    Initials Name Provider Type    Tracey Erazo OT Occupational Therapist                 Mobility/ADL's     Row Name 22 1302          Bed Mobility    Bed Mobility supine-sit;sit-supine  -     Supine-Sit [place] (Bed Mobility) modified independence  -     Sit-Supine [place] (Bed Mobility) modified independence  -     Row Name 22 1302          Transfers    Transfers toilet transfer  -     [place] Level  (Toilet Transfer) modified independence  -     Assistive Device (Toilet Transfer) grab bars/safety frame  -[facility] Name 09/02/22 1302          Toilet Transfer    Type (Toilet Transfer) stand pivot/stand step  -JW     Row Name 09/02/22 1302          Functional Mobility    Functional Mobility- Ind. Level conditional independence  -     Functional Mobility- Comment fxl ambulation in and out of bathroom w/o AD  -JW     Row Name 09/02/22 1302          Activities of Daily Living    BADL Assessment/Intervention lower body dressing  -JW     Row Name 09/02/22 1302          Lower Body Dressing Assessment/Training    [place] Level (Lower Body Dressing) doff;don;socks;independent  -     Position (Lower Body Dressing) edge of bed sitting  -           User Key  (r) = Recorded By, (t) = Taken By, (c) = Cosigned By    Initials Name Provider Type     Tracey Babcock OT Occupational Therapist               Obj/Interventions     Row Name 09/02/22 1303          Sensory Assessment (Somatosensory)    Sensory Assessment (Somatosensory) UE sensation intact  -JW     Row Name 09/02/22 1303          Vision Assessment/Intervention    Visual Impairment/Limitations WNL  -JW     Row Name 09/02/22 1303          Range of Motion Comprehensive    General Range of Motion bilateral upper extremity ROM WNL  -JW     Row Name 09/02/22 1303          Strength Comprehensive (MMT)    General Manual Muscle Testing (MMT) Assessment no strength deficits identified  -JW     Row Name 09/02/22 1303          Balance    Static Sitting Balance independent  -     Dynamic Sitting Balance independent  -     Position, Sitting Balance sitting edge of bed  -     Static Standing Balance modified independence  -     Dynamic Standing Balance supervision  -     Balance Interventions sitting;standing;sit to stand;occupation based/functional task  -           User Key  (r) = Recorded By, (t) = Taken By, (c) = Cosigned By    Initials Name Provider Type     Tracey Erazo OT Occupational Therapist               Goals/Plan    No documentation.                Clinical Impression     Row Name 09/02/22 1304          Pain Assessment    Pretreatment Pain Rating 8/10  -     Posttreatment Pain Rating 8/10  -     Pain Location - back  -     Pain Intervention(s) Repositioned;Ambulation/increased activity  -     Row Name 09/02/22 1304          Plan of Care Review    Plan of Care Reviewed With patient;daughter  -     Outcome Evaluation Patient is a 77 y.o female who presents to [facility] with reports of dizziness. Currently COVID+. She lives with her family, (I) with ADLs and fxl mobility w/o AD, working full time. She presents today with c/o dizziness but much improved. She is (I)-Hilary with bed mobility, fxl ambulation, LB dressing and toilet TFs upon assessment. TF to bedside chair and feeding (I). No OT needs identified at this time, will sign off and rec d/c home  -     Row Name 09/02/22 1304          Therapy Assessment/Plan (OT)    Criteria for Skilled Therapeutic Interventions Met (OT) no;does not meet criteria for skilled intervention  -     Therapy Frequency (OT) evaluation only  -     Row Name 09/02/22 1304          Therapy Plan Review/Discharge Plan (OT)    Anticipated Discharge Disposition (OT) home  -[facility] Name 09/02/22 130          Positioning and Restraints    Pre-Treatment Position in bed  -     Post Treatment Position chair  -JW     In Chair notified nsg;call light within reach;encouraged to call for assist;with family/caregiver  -           User Key  (r) = Recorded By, (t) = Taken By, (c) = Cosigned By    Initials Name Provider Type    Tracey Erazo OT Occupational Therapist               Outcome Measures     Row Name 09/02/22 1304          How much help from another is currently needed...    Putting on and taking off regular lower body clothing? 4  -JW     Bathing (including washing, rinsing, and drying) 4  -JW     Toileting (which  includes using toilet bed pan or urinal) 4  -JW     Putting on and taking off regular upper body clothing 4  -JW     Taking care of personal grooming (such as brushing teeth) 4  -JW     Eating meals 4  -     AM-[facility] 6 Clicks Score (OT) 24  -     Row Name 09/02/22 0915          How much help from another person do you currently need...    Turning from your back to your side while in flat bed without using bedrails? 4  -SM     Moving from lying on back to sitting on the side of a flat bed without bedrails? 4  -SM     Moving to and from a bed to a chair (including a wheelchair)? 4  -SM     Standing up from a chair using your arms (e.g., wheelchair, bedside chair)? 4  -SM     Climbing 3-5 steps with a railing? 4  -SM     To walk in hospital room? 4  -     AM-PAC 6 Clicks Score (PT) 24  -SM     Highest level of mobility 8 --> Walked 250 feet or more  -     Row Name 09/02/22 1307 09/02/22 0915       Functional Assessment    Outcome Measure Options AM-[facility] 6 Clicks Daily Activity (OT)  -[facility] 6 Clicks Basic Mobility (PT)  -          User Key  (r) = Recorded By, (t) = Taken By, (c) = Cosigned By    Initials Name Provider Type    Tracey Erazo, OT Occupational Therapist     Jessica De León, PT Physical Therapist                Occupational Therapy Education                 Title: PT OT SLP Therapies (In Progress)     Topic: Occupational Therapy (Done)     Point: ADL training (Done)     Description:   Instruct learner(s) on proper safety adaptation and remediation techniques during self care or transfers.   Instruct in proper use of assistive devices.              Learning Progress Summary           Patient Acceptance, E, VU by CLARA at 9/2/2022 1304                   Point: Home exercise program (Done)     Description:   Instruct learner(s) on appropriate technique for monitoring, assisting and/or progressing therapeutic exercises/activities.              Learning Progress Summary           Patient Acceptance,  E, VU by  at 9/2/2022 1308                   Point: Precautions (Done)     Description:   Instruct learner(s) on prescribed precautions during self-care and functional transfers.              Learning Progress Summary           Patient Acceptance, E, VU by  at 9/2/2022 1308                   Point: Body mechanics (Done)     Description:   Instruct learner(s) on proper positioning and spine alignment during self-care, functional mobility activities and/or exercises.              Learning Progress Summary           Patient Acceptance, E, VU by  at 9/2/2022 1308                               User Key     Initials Effective Dates Name Provider Type Discipline     06/10/21 -  Tracey Babcock OT Occupational Therapist OT              OT Recommendation and Plan  Therapy Frequency (OT): evaluation only  Plan of Care Review  Plan of Care Reviewed With: patient, daughter  Outcome Evaluation: Patient is a 77 y.o female who presents to [facility] obs with reports of dizziness. Currently COVID+. She lives with her family, (I) with ADLs and fxl mobility w/o AD, working full time. She presents today with c/o dizziness but much improved. She is (I)-Hilary with bed mobility, fxl ambulation, LB dressing and toilet TFs upon assessment. TF to bedside chair and feeding (I). No OT needs identified at this time, will sign off and rec d/c home     Time Calculation:    Time Calculation- OT     Row Name 09/02/22 1309             Time Calculation- OT    OT Start Time 0821  -JW      OT Stop Time 0838  -      OT Time Calculation (min) 17 min  -      Total Timed Code Minutes- OT 8 minute(s)  -      OT Received On 09/02/22  -              Timed Charges    11519 - OT Self Care/Mgmt Minutes 8  -              Untimed Charges    OT Eval/Re-eval Minutes 9  -              Total Minutes    Timed Charges Total Minutes 8  -      Untimed Charges Total Minutes 9  -       Total Minutes 17  -            User Key  (r) = Recorded By, (t) = Taken  By, (c) = Cosigned By    Initials Name Provider Type     Tracey Babcock OT Occupational Therapist              Therapy Charges for Today     Code Description Service Date Service Provider Modifiers Qty    [id number]  OT SELF CARE/MGMT/TRAIN EA 15 MIN 9/2/2022 Tracey Babcock OT GO 1    [id number]  OT EVAL LOW COMPLEXITY 3 9/2/2022 Tracey Bacbock OT GO 1               Tracey Babcock OT  9/2/2022

## 2022-09-02 NOTE — THERAPY EVALUATION
Patient Name: Aliya Villaseñor  : 1945    MRN: [id number]                              Today's Date: 2022       Admit Date: 2022    Visit Dx:     ICD-10-CM ICD-9-CM   1. Syncope, unspecified syncope type  R55 780.2   2. Vertigo  R42 780.4   3. Left upper lobe pulmonary nodule  R91.1 793.11     Patient Active Problem List   Diagnosis   • Dizziness   • CVA (cerebral vascular accident) (Formerly [facility])     Past Medical History:   Diagnosis Date   • Cancer (HCC)    • COPD (chronic obstructive pulmonary disease) (HCC)     [facility] COPD   • Diverticulitis    • Melanoma (HCC)     right leg     Past Surgical History:   Procedure Laterality Date   • HYSTERECTOMY     • SKIN BIOPSY        General Information     Row Name 22          Physical Therapy Time and Intention    Document Type discharge evaluation/summary  -     Mode of Treatment individual therapy;physical therapy  -     Row Name 22          General Information    Patient Profile Reviewed yes  -     Prior Level of Function independent:  -     Row Name 22          Living Environment    People in Home child(rudy), adult;grandchild(rudy)  -     Row Name 22          Cognition    Orientation Status (Cognition) oriented x 4  -           User Key  (r) = Recorded By, (t) = Taken By, (c) = Cosigned By    Initials Name Provider Type     Jessica De León, HILDA Physical Therapist               Mobility     Row Name 22          Bed Mobility    Bed Mobility supine-sit;sit-supine  -     Supine-Sit [place] (Bed Mobility) modified independence  -     Sit-Supine [place] (Bed Mobility) modified independence  -     Row Name 22          Bed-Chair Transfer    Bed-Chair [place] (Transfers) not tested  -     Row Name 22          Sit-Stand Transfer    Sit-Stand [place] (Transfers) supervision  -     Assistive Device (Sit-Stand Transfers) --  no AD  -      [facility] Name 09/02/22 0910          Gait/Stairs (Locomotion)    [place] Level (Gait) supervision  -     Assistive Device (Gait) --  no AD  -     Distance in Feet (Gait) 50ft  -     [place] Level (Stairs) not tested  -           User Key  (r) = Recorded By, (t) = Taken By, (c) = Cosigned By    Initials Name Provider Type     Jessica De León PT Physical Therapist               Obj/Interventions     [facility] Name 09/02/22 0910          Range of Motion Comprehensive    General Range of Motion bilateral lower extremity ROM WFL  -SM     Row Name 09/02/22 0910          Strength Comprehensive (MMT)    General Manual Muscle Testing (MMT) Assessment no strength deficits identified  -SM     Row Name 09/02/22 0910          Balance    Balance Assessment sitting static balance;sitting dynamic balance;sit to stand dynamic balance;standing static balance;standing dynamic balance  -     Static Sitting Balance independent  -SM     Dynamic Sitting Balance modified independence  -SM     Position, Sitting Balance sitting edge of bed  -     Sit to Stand Dynamic Balance modified independence  -     Static Standing Balance modified independence  -     Dynamic Standing Balance supervision  -     Position/Device Used, Standing Balance unsupported  -     Balance Interventions sitting;standing;sit to stand;static;dynamic  -           User Key  (r) = Recorded By, (t) = Taken By, (c) = Cosigned By    Initials Name Provider Type     Jessica De León PT Physical Therapist               Goals/Plan    No documentation.                Clinical Impression     [facility] Name 09/02/22 0911          Pain    Pretreatment Pain Rating 8/10  -SM     Posttreatment Pain Rating 8/10  -     Pain Location - back  -     Pain Intervention(s) Rest;Repositioned  -     Row Name 09/02/22 0911          Plan of Care Review    Plan of Care Reviewed With patient  -     Outcome Evaluation Patient is a 77 y.o female who presents to [facility] obs  with reports of dizziness. Currently COVID+.  Patient AOx4 upon arrival today. Patient reports she lives with daughter and grandchildren. Patient is independent at baseline line with all mobility without the use of an AD. Patient seen today by PT for possible need for epleyhattie mercedes. Patient reports her dizziness symptoms feels like she is lightheaded with minimal room spinning. Patient reports her symptoms are not when she sits up but when she is walking around. Per neurology note destinee hallpike and gaze nystagmus both negative. Epley not appropriate at this time. Patient completed all bed mobility Hilary today. Patient ambualted 50ft in room with SBA-Supervision. Patient completed STS x3 from EOB and commode Hilary. Patient UIC at end of session. Patient appears at baseline function at this time. PT to sign off.  -     Row Name 09/02/22 0911          Therapy Assessment/Plan (PT)    Criteria for Skilled Interventions Met (PT) no problems identified which require skilled intervention  -     Row Name 09/02/22 0911          Vital Signs    O2 Delivery Pre Treatment room air  -SM     O2 Delivery Intra Treatment room air  -SM     O2 Delivery Post Treatment room air  -SM     Pre Patient Position Supine  -SM     Intra Patient Position Standing  -SM     Post Patient Position Sitting  -SM     Row Name 09/02/22 0911          Positioning and Restraints    Pre-Treatment Position in bed  -SM     Post Treatment Position chair  -SM     In Chair notified nsg;call light within reach;encouraged to call for assist;with family/caregiver  -           User Key  (r) = Recorded By, (t) = Taken By, (c) = Cosigned By    Initials Name Provider Type     Jessica De León, PT Physical Therapist               Outcome Measures     Row Name 09/02/22 0915          How much help from another person do you currently need...    Turning from your back to your side while in flat bed without using bedrails? 4  -SM     Moving from lying on back to  sitting on the side of a flat bed without bedrails? 4  -SM     Moving to and from a bed to a chair (including a wheelchair)? 4  -SM     Standing up from a chair using your arms (e.g., wheelchair, bedside chair)? 4  -SM     Climbing 3-5 steps with a railing? 4  -SM     To walk in hospital room? 4  -SM     AM-PAC 6 Clicks Score (PT) 24  -     Highest level of mobility 8 --> Walked 250 feet or more  -     Row Name 09/02/22 0915          Functional Assessment    Outcome Measure Options AM-PAC 6 Clicks Basic Mobility (PT)  -           User Key  (r) = Recorded By, (t) = Taken By, (c) = Cosigned By    Initials Name Provider Type     Jessica De León PT Physical Therapist                             Physical Therapy Education                 Title: PT OT SLP Therapies (In Progress)     Topic: Physical Therapy (In Progress)     Point: Mobility training (Done)     Learning Progress Summary           Patient Acceptance, E, VU by  at 9/2/2022 0915                   Point: Home exercise program (Not Started)     Learner Progress:  Not documented in this visit.          Point: Body mechanics (Done)     Learning Progress Summary           Patient Acceptance, E, VU by  at 9/2/2022 0915                   Point: Precautions (Done)     Learning Progress Summary           Patient Acceptance, E, VU by  at 9/2/2022 0915                               User Key     Initials Effective Dates Name Provider Type [employer] 05/02/22 -  Jessica De León PT Physical Therapist PT              PT Recommendation and Plan     Plan of Care Reviewed With: patient  Outcome Evaluation: Patient is a 77 y.o female who presents to [facility] with reports of dizziness. Currently COVID+.  Patient AOx4 upon arrival today. Patient reports she lives with daughter and grandchildren. Patient is independent at baseline line with all mobility without the use of an AD. Patient seen today by PT for possible need for epley manuever. Patient reports  her dizziness symptoms feels like she is lightheaded with minimal room spinning. Patient reports her symptoms are not when she sits up but when she is walking around. Per neurology note destinee hallpike and gaze nystagmus both negative. Epley not appropriate at this time. Patient completed all bed mobility Hilary today. Patient ambualted 50ft in room with SBA-Supervision. Patient completed STS x3 from EOB and commode Hilary. Patient UIC at end of session. Patient appears at baseline function at this time. PT to sign off.     Time Calculation:    PT Charges     Row Name 09/02/22 0916             Time Calculation    Start Time 0822  -SM      Stop Time 0837  -SM      Time Calculation (min) 15 min  -SM      PT Received On 09/02/22  -SM              Time Calculation- PT    Total Timed Code Minutes- PT 8 minute(s)  -SM              Timed Charges    98064 -  PT Neuromuscular Reeducation Minutes 8  -SM              Total Minutes    Timed Charges Total Minutes 8  -SM       Total Minutes 8  -SM            User Key  (r) = Recorded By, (t) = Taken By, (c) = Cosigned By    Initials Name Provider Type     Jessica De León PT Physical Therapist              Therapy Charges for Today     Code Description Service Date Service Provider Modifiers Qty    42632798647 HC PT NEUROMUSC RE EDUCATION EA 15 MIN 9/2/2022 Jessica De León, PT GP 1    16605149587 HC PT EVAL LOW COMPLEXITY 3 9/2/2022 Jessica De León, PT GP 1          PT G-Codes  Outcome Measure Options: AM-PAC 6 Clicks Basic Mobility (PT)  AM-PAC 6 Clicks Score (PT): 24  Patient was not wearing a face mask during this therapy encounter. Therapist used appropriate personal protective equipment including gown, eye protection, mask and gloves.  Mask used was N95/duckbill. Appropriate PPE was worn during the entire therapy session. Hand hygiene was completed before and after therapy session. Patient is in enhanced droplet precautions.     Jessica De León PT  9/2/2022

## 2022-09-02 NOTE — PROGRESS NOTES
Pt presents to the ED c/o  complaining of dizziness, CT head was negative in the ER.  Did have a left lung opacity requiring further imaging.  Patient currently denies any complaints reports feeling well overall.  She was updated on the abnormal MRI with 6 to 7 mm focus of acute to subacute infarct in the left corona radiata.      On exam,   General: No acute distress, nontoxic  HEENT: Mucous membranes moist, atraumatic, EOMI  Neck: Full ROM  Pulm: Symmetric chest rise, nonlabored, lungs CTAB  Cardiovascular: Regular rate and rhythm, intact distal pulses  GI: Soft, nontender, nondistended, no rebound, no guarding, bowel sounds present  MSK: Full ROM, no deformity  Skin: Warm, dry  Neuro: Awake, alert, oriented x 4, GCS 15, moving all extremities, no focal deficits  Psych: Calm, cooperative      N95, protective eye goggles, and gloves used during this encounter. Patient in surgical mask.      Plan: Awaiting echo results from this morning, awaiting neurology to reevaluate and discuss hospitalization versus discharge based on the MRI findings.  All questions and concerns addressed with the patient.  If going home will need outpatient CT imaging of the left lung opacity.       Attestation:  The RODNEY and I have discussed this patient's history, physical exam, and treatment plan.  I have reviewed the documentation and personally had a face to face interaction with the patient. I affirm the documentation and agree with the treatment and plan.  The attached note describes my personal findings.

## 2022-09-02 NOTE — PLAN OF CARE
Goal Outcome Evaluation:  Plan of Care Reviewed With: patient           Outcome Evaluation: Patient is a 77 y.o female who presents to [facility] obs with reports of dizziness. Currently COVID+.  Patient AOx4 upon arrival today. Patient reports she lives with daughter and grandchildren. Patient is independent at baseline line with all mobility without the use of an AD. Patient seen today by PT for possible need for epley manuever. Patient reports her dizziness symptoms feels like she is lightheaded with minimal room spinning. Patient reports her symptoms are not when she sits up but when she is walking around. Per neurology note destinee hallpike and gaze nystagmus both negative. Epley not appropriate at this time. Patient completed all bed mobility Hilary today. Patient ambualted 50ft in room with SBA-Supervision. Patient completed STS x3 from EOB and commode Hilary. Patient UIC at end of session. Patient appears at baseline function at this time. PT to sign off.

## 2022-09-02 NOTE — PROGRESS NOTES
ED OBSERVATION PROGRESS/DISCHARGE SUMMARY    Date of Admission: 9/1/2022   LOS: 0 days   PCP: Kike Yu MD    Subjective    No acute events overnight.      Hospital Outcome:   77-year-old female admitted to the observation unit with further evaluation of sudden onset of dizziness.  In the ER patient was noted with positive orthostatic vital signs was started on IV fluid hydration.  CT of the head showed evidence of prior ischemic insults.  Chest x-ray showed a opacity in the left midlung therefore CT chest was ordered that revealed a 10 mm ill-defined nodular density with some surrounding groundglass opacity in the left upper lobe that is indeterminate for malignancy.  Patient will need short-term follow-up CT of the chest, this was discussed and outpatient multiplidisciplinary consult was placed for pulmonary nodule follow-up, patient is agreeable to plan.     MRI with and without contrast with bilateral internal auditory canals revealed a tiny 6 to 7 mm focus of acute to subacute infarct in the left corona radiata with moderately severe changes of bilateral small vessel white matter ischemic disease.  Neurology has seen and evaluated patient and cleared for discharge home from their standpoint.  They recommend repeat CT of the chest in 6 to 8 weeks, dual antiplatelets for 3 weeks then only aspirin.  They recommend atorvastatin 40 mg daily with a Holter and follow-up in 3 months.      ROS:  General: no fevers, chills  Respiratory: no cough, dyspnea  Cardiovascular: no chest pain, palpitations  Abdomen: No abdominal pain, nausea, vomiting, or diarrhea  Neurologic: No focal weakness    Objective   Physical Exam:  I have reviewed the vital signs.  Temp:  [97.7 °F (36.5 °C)-99.1 °F (37.3 °C)] 97.7 °F (36.5 °C)  Heart Rate:  [63-79] 63  Resp:  [16-18] 16  BP: (114-195)/() 165/77  General Appearance:    Alert, cooperative, no distress  Head:    Normocephalic, atraumatic  Eyes:    Sclerae anicteric  Neck:    Supple, no mass  Lungs: Clear to auscultation bilaterally, respirations unlabored  Heart: Regular rate and rhythm, S1 and S2 normal, no murmur, rub or gallop  Abdomen:  Soft, non-tender, bowel sounds active, nondistended  Extremities: No clubbing, cyanosis, or edema to lower extremities  Pulses:  2+ and symmetric in distal lower extremities  Skin: No rashes   Neurologic: Oriented x3, Normal strength to extremities    Results Review:    I have reviewed the labs, radiology results and diagnostic studies.    Results from last 7 days   Lab Units 09/01/22  1013   WBC 10*3/mm3 6.95   HEMOGLOBIN g/dL 12.6   HEMATOCRIT % 37.9   PLATELETS 10*3/mm3 336     Results from last 7 days   Lab Units 09/01/22  1013   SODIUM mmol/L 141   POTASSIUM mmol/L 3.4*   CHLORIDE mmol/L 100   CO2 mmol/L 30.6*   BUN mg/dL 11   CREATININE mg/dL 0.64   CALCIUM mg/dL 9.5   BILIRUBIN mg/dL 0.3   ALK PHOS U/L 118*   ALT (SGPT) U/L 22   AST (SGOT) U/L 20   GLUCOSE mg/dL 96     Imaging Results (Last 24 Hours)     Procedure Component Value Units Date/Time    CT Chest With Contrast Diagnostic [[id number]] Collected: 09/01/22 2337     Updated: 09/01/22 2342    Narrative:      CT OF THE CHEST WITH CONTRAST 09/01/2022     HISTORY: Increased density in left midlung on chest x-ray.     Axial images were obtained from the lung apices to the upper abdomen  after intravenous contrast.     In the left midlung there is an approximately 10 mm somewhat ill-defined  nodule with some surrounding vague ground glass opacity. There is some  minimal irregular pleural thickening in both lung apices which appears  partially calcified.     No other lung masses or significant infiltrates are seen.     No pathologically enlarged lymph nodes are seen.     There is some aortic and coronary calcification.     The upper abdomen is unremarkable.       Impression:      1. There is an approximately 10 mm Ill-defined nodular density with some  surrounding ground glass opacity in the  left upper lobe.  2. There are no previous studies available for comparison.  3. This finding is indeterminate for malignancy. Consider a short-term  follow-up CT of the chest in approximately 6 weeks to 8 weeks.     Radiation dose reduction techniques were utilized, including automated  exposure control and exposure modulation based on body size.     This report was finalized on 9/1/2022 11:39 PM by Dr. Jeramy Perez M.D.       MRI Internal Auditory Canal With Wo [[id number]] Collected: 09/01/22 2124     Updated: 09/01/22 2124    Narrative:      MRI INTERNAL AUDITORY CANAL W WO-  09/01/2022     HISTORY: Dizziness.     Multiple pre and postcontrast sagittal, axial and coronal images were  obtained through the brain.     Thin section images were obtained through the internal auditory canals.     On the diffusion-weighted images there is a punctate focus of bright  signal in the left corona radiata which may represent small  acute-to-subacute infarct.     FLAIR images show scattered areas of bright signal intensity in the  bilateral cerebral white matter consistent with moderately severe  bilateral small vessel white matter ischemic disease.     No intracranial hemorrhage is seen.     The craniocervical junction and sella appear normal.     No abnormal enhancement is seen following gadolinium.     The bilateral internal auditory canals appear normal. No masses are  seen. No abnormal enhancement is seen.       Impression:      1. Tiny 6 mm to 7 mm focus of acute-to-subacute infarct in the left  corona radiata.  2. Moderately severe changes of bilateral small vessel white matter  ischemic disease.  3. No intracranial hemorrhage is seen.  4. No abnormalities are seen in the internal auditory canals.          CT Head Without Contrast [[id number]] Collected: 09/01/22 1208     Updated: 09/01/22 1209    Narrative:      CT HEAD WITHOUT CONTRAST     HISTORY: Syncope.     COMPARISON: None.     FINDINGS: The brain and  ventricles are symmetrical. There is no evidence  of hemorrhage, hydrocephalus or of abnormal extra-axial fluid. Decreased  attenuation involving the white matter of the cerebral hemispheres is  appreciated bilaterally consistent with moderate-to-severe small vessel  ischemic disease and scattered lacunar infarcts involving the corona  radiata bilaterally. No focal area of decreased attenuation to suggest  acute infarction is identified.       Impression:      Small vessel ischemic disease and scattered lacunar infarcts  involving the corona radiata are noted bilaterally. There is no evidence  of acute infarction or hemorrhage. Further evaluation could be performed  with MRI examination of the brain as indicated.           Radiation dose reduction techniques were utilized, including automated  exposure control and exposure modulation based on body size.          XR Chest 1 View [[id number]] Collected: 09/01/22 1026     Updated: 09/01/22 1031    Narrative:      XR CHEST 1 VW-     Clinical: Covid positive, vertigo     COMPARISON: None     FINDINGS: There is atherosclerotic calcification of the aorta, heart  size within normal limits. No effusion or edema. Right lung is clear.  Vague 2.3 cm opacity demonstrated within the left mid lung zone, this  may simply represent superimposition of normal parenchymal markings,  ribs and scapula however nodule or focal airspace disease not excluded.  PA and lateral view of the chest would be the best means for further  evaluation when patient condition permits.     This report was finalized on 9/1/2022 10:28 AM by Dr. Papito Ang M.D.         Echocardiogram 9/2/2022  Interpretation Summary    · Saline test results are negative.  · Estimated right ventricular systolic pressure from tricuspid regurgitation is normal (<35 mmHg).  · Estimated left ventricular EF = 67% Left ventricular systolic function is normal.  · Left ventricular diastolic function was normal.            I have  reviewed the medications.  ---------------------------------------------------------------------------------------------  Assessment & Plan   Assessment/Problem List    Dizziness    CVA (cerebral vascular accident) ([facility])      Plan:    Dizziness  Syncope  CVA  -MRI brain and internal auditory canal with and without shows tiny 6 to 7 mm subacute to acute infarct in the left corona radiata  -Echocardiogram negative for PFO, ejection fraction approximately 67%  -Neurology has cleared patient for discharge home  -Carotid ultrasound shows mild nonobstructing disease  -Orthostatics positive in the ER  -IV fluids    Malnutrition  -BMI 18.16  -Patient reports poor oral intake, concerning with finding of pulmonary nodule indeterminate for malignancy  -Nutritionist consult, they recommend adding boost plus with meals    Pulmonary nodule, left upper lobe  -CT chest with shows 10 mm ill-defined nodular density with some surrounding groundglass opacity in the left upper lobe that is indeterminate for malignancy  -Recommend short-term follow-up with CT in 6 to 8 weeks  -Referral to outpatient pulmonary nodule clinic placed    Recent COVID-19 infection  -Positive test on 8/24  -Status post 3-day course of Paxlovid   -Currently asymptomatic, continue to monitor     Hypertension/anxiety/allergies/COPD  -Chronic conditions, no new issues  -Continue home medications as prescribed    Disposition: Home    Follow-up after Discharge: Neurology in 3 months    This note will serve as a discharge summary    Arielle Gould PA-C 09/02/22 04:10 EDT

## 2022-09-02 NOTE — CASE MANAGEMENT/SOCIAL WORK
Discharge Planning Assessment  [facility]     Patient Name: Aliya Villaseñor  MRN: [id number]  Today's Date: 9/2/2022    Admit Date: 9/1/2022     Discharge Needs Assessment     Row Name 09/02/22 0915       Living Environment    People in Home child(rudy), adult;grandchild(rudy)    Name(s) of People in Home daughter Елена and grandchildren ages 14 and 20    Current Living Arrangements apartment    Primary Care Provided by self    Provides Primary Care For no one    Family Caregiver if Needed child(rudy), adult    Family Caregiver Names Елена    Quality of Family Relationships supportive    Able to Return to Prior Arrangements yes       Resource/Environmental Concerns    Resource/Environmental Concerns none       Transition Planning    Patient/Family Anticipates Transition to home with family    Patient/Family Anticipated Services at Transition none    Transportation Anticipated family or friend will provide       Discharge Needs Assessment    Equipment Currently Used at Home none    Concerns to be Addressed no discharge needs identified    Anticipated Changes Related to Illness none    Equipment Needed After Discharge none    Provided Post Acute Provider List? N/A    Provided Post Acute Provider Quality & Resource List? N/A               Discharge Plan     Row Name 09/02/22 0917       Plan    Plan Introduced self and explained role of CCP. PPE used. Info on facesheet verified    Provided Post Acute Provider List? N/A    Provided Post Acute Provider Quality & Resource List? N/A    Plan Comments Lives in ap't w/ daughter Елена and grandchildren ages 14 and 20 and plans to return at d/c. Daughter will provide transport. Independent w/ ADLs. No assistive devices used. denies any d/c needs or financial concerns              Continued Care and Services - Admitted Since 9/1/2022    Coordination has not been started for this encounter.          Demographic Summary    No documentation.                Functional Status    No  documentation.                Psychosocial    No documentation.                Abuse/Neglect    No documentation.                Legal    No documentation.                Substance Abuse    No documentation.                Patient Forms    No documentation.                   Hemalatha Irwin RN

## 2022-09-02 NOTE — PROGRESS NOTES
"DOS: 2022  NAME: Aliya Villaseñor   : 1945  PCP: Kike Yu MD  Chief Complaint   Patient presents with   • COVID +   • Dizziness     Patient seen in follow-up today; new to me        Neurology        Subjective: No acute events overnight although patient reports still does not feel well \"from COVID\" he denies any new complaints and or concerns on my exam.    No family at bedside    Objective:  Vital signs: /68 (BP Location: Right arm, Patient Position: Standing)   Pulse 58   Temp 98.1 °F (36.7 °C) (Oral)   Resp 16   Ht 152.4 cm (60\")   Wt 42.5 kg (93 lb 9.6 oz)   SpO2 99%   BMI 18.28 kg/m²       HEENT: Normocephalic, atraumatic   COR: RRR  Resp: Even and unlabored  Extremities: Equal pulses, nondistal embolization    Neurological:   MS: AO. Language normal. No obvious neglect.   CN: II-XII normal  Motor: 5/5, normal tone  Reflexes: Toes downgoing  Sensory: Intact-to light touch  Coordination: Normal-finger-to-nose    Laboratory results:  Lab Results   Component Value Date    GLUCOSE 77 2022    CALCIUM 8.4 (L) 2022     2022    K 3.1 (L) 2022    CO2 27.9 2022     2022    BUN 6 (L) 2022    CREATININE 0.48 (L) 2022    EGFRIFNONA 76 2020    BCR 12.5 2022    ANIONGAP 7.1 2022     Lab Results   Component Value Date    WBC 5.11 2022    HGB 11.2 (L) 2022    HCT 33.2 (L) 2022    MCV 89.7 2022     2022     Lab Results   Component Value Date    CHOL 189 2022     Lab Results   Component Value Date    HDL 83 (H) 2022     Lab Results   Component Value Date    LDL 82 2022     Lab Results   Component Value Date    TRIG 140 2022         Lab 22  1013   HEMOGLOBIN A1C 6.00*      Review and interpretation of imaging:  Imaging discussed below and reviewed.    Impression: This is a 77-year-old female with known history of COPD, diverticulitis, melanoma who " presented to [facility] 9/1 due to report of sudden onset of vertiginous symptoms which started on date of arrival patient became extremely nauseated and throwing up therefore presented to [facility].  Patient reported that prior to arrival she noted that she was COVID-positive; retested on arrival.  COVID again confirmed.  Patient received Zofran/Antivert and later received scopolamine patch and symptoms improved.  Patient reported significant weight loss over the last 3 months which she attributes to stress at home and work.  Holland-Hallpike was unremarkable and there was no evidence of lateral gaze nystagmus on her initial exam.  Initial CT of the head showed small vessel ischemic disease and scattered lacunar infarcts involving the corona radiata bilaterally.  No acute infarct or hemorrhage noted.  MRI of the brain later showed tiny 6 to 7 mm focus of acute to subacute infarct within the left corona radiata.  Moderately severe changes in bilateral small vessel white matter ischemic disease.  No evidence of hemorrhage/mass or abnormalities noted in the internal auditory canals.  Carotid duplex ordered which showed bilateral ICA plaque without significant stenosis.  TTE shows EF 67%.  LV normal.  LA normal.  Saline test negative; no mention of PFO.  Left atrial volume index 23.3.  Patient started on aspirin 81 mg daily and atorvastatin 40 mg daily along with Plavix 75 mg daily will recommend dual oral antiplatelet 21 days then discontinue Plavix and continue low-dose aspirin along with high-dose statin from secondary stroke prevention perspective.  CT of the chest showed 10 mm ill-defined nodular density in left upper lobe; no prior studies for comparison recommend repeat CT of the chest in 6 to 8 weeks.     Neurologically, stable with nonfocal exam.  Recommendations below.PT/OT/ST. CCP to assist with discharge planning. Call RRT for any acute neurological changes and/or concerns. No  further neurology workup indicated. We will sign off and see again upon request.      Diagnosis:  1.  Acute to subacute left corona radiata stroke in setting of COVID-19 and CT of the chest with positive lung mass  2.  Abnormal CT of the chest; ill-defined nodular density in left upper lobe-repeat imaging recommended 6 8  3.  Unexplained weight loss likely secondary to #2  4.  COVID-19      Plan:  · Recommend dual oral antiplatelets with aspirin 81 mg daily, Plavix 75 mg daily x21 days then discontinue Plavix and atorvastatin 40 mg daily  · Repeat CT of the chest 6 to 8 weeks per radiology recommended  · Long-term Holter at discharge  · Follow-up with neurology 3 months    Case reviewed with attending neurologist  and agrees with treatment plan above.    Observation unit [facility] updated regarding treatment recommendations and plan    ABE Fofana

## 2022-09-03 ENCOUNTER — READMISSION MANAGEMENT (OUTPATIENT)
Dept: CALL CENTER | Facility: HOSPITAL | Age: 77
End: 2022-09-03

## 2022-09-03 NOTE — OUTREACH NOTE
COVID-19 Week 1 Survey    Flowsheet Row Responses   [facility] patient discharged from? [place]   Does the patient have one of the following disease processes/diagnoses(primary or secondary)? COVID-19   COVID-19 underlying condition? COPD   Call Number Call 1   Week 1 Call successful? No   Discharge diagnosis CVA COVID19 Detected            JOHN JAMIL - Registered Nurse

## 2022-09-05 ENCOUNTER — READMISSION MANAGEMENT (OUTPATIENT)
Dept: CALL CENTER | Facility: HOSPITAL | Age: 77
End: 2022-09-05

## 2022-09-05 NOTE — OUTREACH NOTE
COVID-19 Week 1 Survey    Flowsheet Row Responses   [facility] patient discharged from? [place]   Does the patient have one of the following disease processes/diagnoses(primary or secondary)? COVID-19   COVID-19 underlying condition? COPD   Call Number Call 2   Week 1 Call successful? Yes   Call start time 0813   Call end time 0823   Discharge diagnosis CVA COVID19 Detected     Is patient permission given to speak with other caregiver? Yes   List who call center can speak with pt   Meds reviewed with patient/caregiver? Yes   Is the patient having any side effects they believe may be caused by any medication additions or changes? No   Does the patient have all medications ordered at discharge? Yes   Is the patient taking all medications as directed (includes completed medication regime)? Yes   Comments regarding appointments Pt to make appointment with pcp.   Does the patient have a primary care provider?  Yes   Has the patient kept scheduled appointments due by today? N/A   Has home health visited the patient within 72 hours of discharge? N/A   Psychosocial issues? No   Did the patient receive a copy of their discharge instructions? Yes   Did the patient receive a copy of COVID-19 specific instructions? Yes   Nursing interventions Reviewed instructions with patient   What is the patient's perception of their health status since discharge? Improving   Does the patient have any of the following symptoms? None   Nursing Interventions Nurse provided patient education   Pulse Ox monitoring None   Is the patient/caregiver able to teach back steps to recovery at home? Set small, achievable goals for return to baseline health, Rest and rebuild strength, gradually increase activity   Is the patient/caregiver able to teach back the hierarchy of who to call/visit for symptoms/problems? PCP, Specialist, Home health nurse, Urgent Care, ED, 911 Yes   COVID-19 call completed? Yes   Wrap up additional comments Pt reports  feeling well. Pt has minimal s/s r/t COVID. Pt will follow up with pcp this week.          JOHN JAMIL - Registered Nurse

## 2022-09-12 ENCOUNTER — READMISSION MANAGEMENT (OUTPATIENT)
Dept: CALL CENTER | Facility: HOSPITAL | Age: 77
End: 2022-09-12

## 2022-09-12 ENCOUNTER — TELEPHONE (OUTPATIENT)
Dept: NEUROLOGY | Facility: CLINIC | Age: 77
End: 2022-09-12

## 2022-09-12 NOTE — OUTREACH NOTE
COVID-19 Week 2 Survey    Flowsheet Row Responses   [facility] patient discharged from? [place]   Does the patient have one of the following disease processes/diagnoses(primary or secondary)? COVID-19   COVID-19 underlying condition? COPD   Call Number Call 1   COVID-19 Week 2: Call 1 attempt successful? Yes   Call start time 1253   Call end time 1312   Discharge diagnosis CVA COVID19 Detected     Medication alerts for this patient PLAVIX   Meds reviewed with patient/caregiver? Yes   Is the patient having any side effects they believe may be caused by any medication additions or changes? No   Does the patient have all medications ordered at discharge? Yes   Is the patient taking all medications as directed (includes completed medication regime)? Yes   Comments regarding appointments Patient has called Neurology and is awaiting a call back today   Does the patient have a primary care provider?  Yes   Comments regarding PCP Patient has completed f/u with PCP who has referred her to Neurology   Has the patient kept scheduled appointments due by today? Yes   Has home health visited the patient within 72 hours of discharge? N/A   Psychosocial issues? No   Did the patient receive a copy of their discharge instructions? Yes   Did the patient receive a copy of COVID-19 specific instructions? Yes   Nursing interventions Reviewed instructions with patient   What is the patient's perception of their health status since discharge? Same  [Reports she is still struggling with weakness, dizziness as when hospitalzied and reports she has no explanation.  Her BP is 160/84, she checks often.  Her PCP has advised her to not to return to work which is causing her distress as she needs to return.]   Does the patient have any of the following symptoms? None  [Remains asyptomatic w/ Covid respiratory s/s, normal SOA for her]   Nursing Interventions Nurse provided patient education  [She reports that PCP has deferred her to neuro  r/t dizziness but needs to get in quickly for dx, treatment so she can return to work.  This RN discussed that at times there is a wait w/neuro--she is very stressed.  She has no relief with her s/s.  ]   Pulse Ox monitoring None   Is the patient/caregiver able to teach back steps to recovery at home? Rest and rebuild strength, gradually increase activity   Is the patient/caregiver able to teach back the hierarchy of who to call/visit for symptoms/problems? PCP, Specialist, Home health nurse, Urgent Care, ED, 911 Yes  [aware of return s/s. staying hydrated]   COVID-19 call completed? Yes          BLAS AYALA - Registered Nurse

## 2022-09-12 NOTE — TELEPHONE ENCOUNTER
PATIENT CALLED TO ADVISE SHE WAS SEEN BY YE MARTINEZ IN THE HOSPITAL WHO TOLD HER TO CALL AND SCHEDULE A FOLLOW UP.    PLEASE ADVISE.    THANK YOU

## 2022-09-19 ENCOUNTER — READMISSION MANAGEMENT (OUTPATIENT)
Dept: CALL CENTER | Facility: HOSPITAL | Age: 77
End: 2022-09-19

## 2022-09-19 NOTE — OUTREACH NOTE
COVID-19 Week 3 Survey    Flowsheet Row Responses   [facility] patient discharged from? [place]   Does the patient have one of the following disease processes/diagnoses(primary or secondary)? COVID-19   COVID-19 underlying condition? COPD   Call Number Call 1   COVID-19 Week 3: Call 1 attempt successful? Yes   Call start time 1737   Call end time 1738   Discharge diagnosis CVA COVID19 Detected     Is the patient taking all medications as directed (includes completed medication regime)? Yes   Does the patient have a primary care provider?  Yes   Comments regarding PCP had followed up with PCP   Has the patient kept scheduled appointments due by today? Yes   Has home health visited the patient within 72 hours of discharge? N/A   Psychosocial issues? No   What is the patient's perception of their health status since discharge? Same  [says she is still feeling the same but has an appt Thursday with another doctor]   Is the patient/caregiver able to teach back the hierarchy of who to call/visit for symptoms/problems? PCP, Specialist, Home health nurse, Urgent Care, ED, 911 Yes   COVID-19 call completed? Yes   Wrap up additional comments States she is not feeling much better, has an appt on Thursday with a new doctor.          ANALIA TANG - Registered Nurse

## 2022-09-21 ENCOUNTER — NURSE TRIAGE (OUTPATIENT)
Dept: CALL CENTER | Facility: HOSPITAL | Age: 77
End: 2022-09-21

## 2022-09-21 NOTE — TELEPHONE ENCOUNTER
"Caller given directions from [employer] site from her home to office.     Reason for Disposition  • General information question, no triage required and triager able to answer question    Additional Information  • Negative: [1] Caller is not with the adult (patient) AND [2] reporting urgent symptoms  • Negative: Lab result questions  • Negative: Medication questions  • Negative: Caller can't be reached by phone  • Negative: Caller has already spoken to PCP or another triager  • Negative: RN needs further essential information from caller in order to complete triage  • Negative: Requesting regular office appointment  • Negative: [1] Caller requesting NON-URGENT health information AND [2] PCP's office is the best resource  • Negative: Health Information question, no triage required and triager able to answer question    Answer Assessment - Initial Assessment Questions  1. REASON FOR CALL or QUESTION: \"What is your reason for calling today?\" or \"How can I best help you?\" or \"What question do you have that I can help answer?\"      Caller asking for directions to her appointment tomorrow    Protocols used: INFORMATION ONLY CALL - NO TRIAGE-ADULT-      "

## 2022-09-22 ENCOUNTER — CONSULT (OUTPATIENT)
Dept: RADIATION ONCOLOGY | Facility: HOSPITAL | Age: 77
End: 2022-09-22

## 2022-09-22 ENCOUNTER — OFFICE VISIT (OUTPATIENT)
Dept: OTHER | Facility: HOSPITAL | Age: 77
End: 2022-09-22

## 2022-09-22 VITALS
RESPIRATION RATE: 18 BRPM | WEIGHT: 88.4 LBS | DIASTOLIC BLOOD PRESSURE: 69 MMHG | TEMPERATURE: 97.8 F | HEIGHT: 59 IN | SYSTOLIC BLOOD PRESSURE: 117 MMHG | BODY MASS INDEX: 17.82 KG/M2 | OXYGEN SATURATION: 97 % | HEART RATE: 71 BPM

## 2022-09-22 VITALS
TEMPERATURE: 97.8 F | SYSTOLIC BLOOD PRESSURE: 117 MMHG | DIASTOLIC BLOOD PRESSURE: 69 MMHG | HEIGHT: 59 IN | RESPIRATION RATE: 18 BRPM | OXYGEN SATURATION: 97 % | HEART RATE: 71 BPM | WEIGHT: 88.3 LBS | BODY MASS INDEX: 17.8 KG/M2

## 2022-09-22 DIAGNOSIS — R91.1 LUNG NODULE: Primary | ICD-10-CM

## 2022-09-22 DIAGNOSIS — R91.1 NODULE OF UPPER LOBE OF LEFT LUNG: Primary | ICD-10-CM

## 2022-09-22 PROCEDURE — 99205 OFFICE O/P NEW HI 60 MIN: CPT | Performed by: STUDENT IN AN ORGANIZED HEALTH CARE EDUCATION/TRAINING PROGRAM

## 2022-09-22 RX ORDER — DIPHENOXYLATE HYDROCHLORIDE AND ATROPINE SULFATE 2.5; .025 MG/1; MG/1
1 TABLET ORAL DAILY
COMMUNITY

## 2022-09-22 NOTE — PATIENT INSTRUCTIONS
Met with patient and family member in lung clinic. PFTs ordered for 9/27, PET scan ordered for 9/30. Provided education pamphlet on PFTs and PET scan and explained pertinent information regarding preparation for tests. Patient and family member able to repeat back and verbalize understanding. Neurology appt successfully moved from 1/2023 to 11/21/22 @ 1pm. Pt instructed if dizziness worsens or develops other neuro symptoms, seek immediate medical attention or go to the ER.    No additional needs noted.    Scheduled for follow up appt in lung clinic 10/6/22    Renuka Dunham RN, BSN, OCN, CCM

## 2022-09-22 NOTE — PROGRESS NOTES
[facility] Radiation Oncology   Consult    Chief Complaint  Lung nodule      Diagnosis: Lung nodule    Overall Stage Workup not complete      Pacemaker: no  Prior History of Radiation: no  Contraindications to Radiation: no  Patient Requires Pregnancy Test: No, patient is female and >55 years and/or has undergone hysterectomy    HPI:    Aliya Villaseñor presents to multidisciplinary thoracic clinic following an incidental finding of a left upper lobe 1 cm nodule during work-up for dizziness/double vision.  Briefly, the patient has a past medical history of hypertension, anxiety, COPD, previous melanoma of the right lower extremity surgically resected without adjuvant treatment.  She presented to the [facility] ED on 9/1 with nearly a year long history of dizziness/lightheadedness/double vision which has been worsening.  So far the cause of these episodes have not been identified.  MRI of the brain, CT of the chest, Doppler carotid ultrasound, echocardiogram, and Holter evaluation have not found an obvious cause.  She is scheduled to see neurology regarding this in January 2023.    With regards to her lung nodule, she has not undergone PET/CT, and does not have a tissue diagnosis.  She does not have prior PFTs on file.      Imaging:    CT head 9/1/2022    IMPRESSION:  Small vessel ischemic disease and scattered lacunar infarcts  involving the corona radiata are noted bilaterally. There is no evidence  of acute infarction or hemorrhage. Further evaluation could be performed  with MRI examination of the brain as indicated.      MRI internal auditory canal 9/1/2022    IMPRESSION:  1. Tiny 6 mm to 7 mm focus of acute-to-subacute infarct in the left  corona radiata.  2. Moderately severe changes of bilateral small vessel white matter  ischemic disease.  3. No intracranial hemorrhage is seen.  4. No abnormalities are seen in the internal auditory canals.      CT chest with contrast 9/1/2022    IMPRESSION:  1. There is an  approximately 10 mm Ill-defined nodular density with some  surrounding ground glass opacity in the left upper lobe.  2. There are no previous studies available for comparison.  3. This finding is indeterminate for malignancy. Consider a short-term  follow-up CT of the chest in approximately 6 weeks to 8 weeks.      Pathology:    No pertinent pathology for review    Labs:    Lab Results   Component Value Date    CREATININE 0.48 (L) 09/02/2022             Problem List:  Patient Active Problem List   Diagnosis   • Dizziness   • CVA (cerebral vascular accident) (HCC)          Medications:  Current Outpatient Medications on File Prior to Visit   Medication Sig Dispense Refill   • ALPRAZolam (XANAX) 0.5 MG tablet Take 0.5 mg by mouth.     • aspirin (aspirin) 81 MG EC tablet Take 1 tablet by mouth Daily. 90 tablet 1   • atorvastatin (LIPITOR) 40 MG tablet Take 1 tablet by mouth Daily. 90 tablet 1   • carvedilol (COREG) 25 MG tablet Take 25 mg by mouth 2 (Two) Times a Day With Meals.     • clopidogrel (Plavix) 75 MG tablet Take 1 tablet by mouth Daily. 21 tablet 0   • estradiol (ESTRACE) 2 MG tablet Take 2 mg by mouth Daily.     • Fluticasone-Salmeterol (ADVAIR/WIXELA) 250-50 MCG/ACT DISKUS      • Fluticasone-Umeclidin-Vilant (TRELEGY ELLIPTA IN) Inhale.     • loratadine (CLARITIN) 10 MG tablet Take 10 mg by mouth Daily.     • multivitamin (THERAGRAN) tablet tablet Take  by mouth.     • valsartan (DIOVAN) 80 MG tablet Take 160 mg by mouth 2 (Two) Times a Day.       No current facility-administered medications on file prior to visit.          Allergies:  Allergies   Allergen Reactions   • Amlodipine Unknown - Low Severity   • Cephalosporins Unknown - Low Severity   • Ciprofloxacin Unknown - Low Severity   • Erythromycin Unknown - Low Severity   • Nefazodone Unknown - Low Severity   • Sulfamethoxazole-Trimethoprim Unknown - Low Severity   • Valsartan Unknown - Low Severity         Family History:  The patient has no family  "history of conditions which would be contraindications to radiation therapy      Social History:  Former Smoker 25 Pack Years    Distance From Clinic: 30 minutes - 1 hour    Patient has someone who can assist with transportation: yes      Review of Systems:    Review of Systems   Constitutional: Positive for fatigue.   Neurological: Positive for dizziness and light-headedness.   Psychiatric/Behavioral: The patient is nervous/anxious.    All other systems reviewed and are negative.        Vital Signs:  There were no vitals taken for this visit.  Estimated body mass index is 17.85 kg/m² as calculated from the following:    Height as of an earlier encounter on 9/22/22: 149.9 cm (59\").    Weight as of an earlier encounter on 9/22/22: 40.1 kg (88 lb 6.4 oz).  There were no vitals filed for this visit.      ECOG: Restricted in physically strenuous activity but ambulatory and able to carry out work of a light or sedentary nature, e.g., light house work, office work = 1    Physical Exam  Vitals reviewed.   Constitutional:       General: She is not in acute distress.     Appearance: Normal appearance.   HENT:      Head: Normocephalic and atraumatic.   Eyes:      Extraocular Movements: Extraocular movements intact.      Pupils: Pupils are equal, round, and reactive to light.   Pulmonary:      Effort: Pulmonary effort is normal.      Breath sounds: Normal breath sounds.   Abdominal:      General: Abdomen is flat.      Palpations: Abdomen is soft.   Musculoskeletal:      Cervical back: Normal range of motion and neck supple.   Skin:     General: Skin is warm and dry.   Neurological:      General: No focal deficit present.      Mental Status: She is alert and oriented to person, place, and time.   Psychiatric:         Mood and Affect: Mood normal.         Behavior: Behavior normal.          Result Review :  The following data was reviewed by: Jose Vo MD on 09/22/2022:  Labs: Last Creatinine   Data reviewed: Radiologic " studies CT chest, MRI brain            There are no diagnoses linked to this encounter.    Assessment:    The patient is a 77-year-old occasion female with an incidental finding of a 1 cm left upper lobe nodule during a work-up for acute on chronic dizziness/lightheadedness/double vision.  The patient has not yet had a PET/CT, there is no pathology at this time, and she does not have PFTs on file.  We will plan for PET/CT and PFTs to continue her work-up for this finding.  We will try and see if there is any way to move up her neurology appointment to help with her severe dizziness/lightheaded symptoms.  Do not have any evidence that the symptoms are related to her lung nodule at this time.    Plan:  -Dr. Mclain's team have ordered PFTs, PET CT  -We will reach out to neurology and see if there is any way for her to be seen sooner for evaluation of her dizziness   - Have her follow-up in multidisciplinary clinic once we have the results of these next tests    I spent 60 minutes caring for Aliya on this date of service. This time includes time spent by me in the following activities:preparing for the visit, reviewing tests, obtaining and/or reviewing a separately obtained history, counseling and educating the patient/family/caregiver, referring and communicating with other health care professionals , documenting information in the medical record, independently interpreting results and communicating that information with the patient/family/caregiver and care coordination  Follow Up   No follow-ups on file.  Patient was given instructions and counseling regarding her condition or for health maintenance advice. Please see specific information pulled into the AVS if appropriate.     Jose Vo MD

## 2022-09-26 ENCOUNTER — READMISSION MANAGEMENT (OUTPATIENT)
Dept: CALL CENTER | Facility: HOSPITAL | Age: 77
End: 2022-09-26

## 2022-09-26 NOTE — OUTREACH NOTE
"COVID-19 Week 4 Survey    Flowsheet Row Responses   [facility] patient discharged from? [place]   Does the patient have one of the following disease processes/diagnoses(primary or secondary)? COVID-19   COVID-19 underlying condition? COPD   Call Number Call 1   COVID-19 Week 4: Call 1 attempt successful? Yes   Call start time 1635   Call end time 1640   Discharge diagnosis CVA COVID19 Detected     Meds reviewed with patient/caregiver? Yes   Is the patient having any side effects they believe may be caused by any medication additions or changes? No   Does the patient have all medications ordered at discharge? Yes   Is the patient taking all medications as directed (includes completed medication regime)? Yes   Has the patient kept scheduled appointments due by today? Yes   Is the patient still receiving Home Health Services? N/A   What is the patient's perception of their health status since discharge? Same  [states has developed SOB and \"tightness in chest\", plans to see pulmonary MD in am, 9/27/22]   Does the patient have any of the following symptoms? Shortness of breath   Nursing Interventions Nurse provided patient education   Pulse Ox monitoring None   Is the patient/caregiver able to teach back steps to recovery at home? Set small, achievable goals for return to baseline health, Rest and rebuild strength, gradually increase activity, Eat a well-balance diet   If the patient is a current smoker, are they able to teach back resources for cessation? Not a smoker   Is the patient/caregiver able to teach back the hierarchy of who to call/visit for symptoms/problems? PCP, Specialist, Home health nurse, Urgent Care, ED, 911 Yes   Is the patient able to teach back COPD zones? Yes   Patient reports what zone on this call? Green Zone   Green Zone Reports doing well, Usual activity and exercise level, Usual amounts of cough and phlegm/mucous, Appetite is good, Slept well last night   Green Zone interventions: " Take daily medications, Continue regular exercise/diet plan   Is the patient interested in additional calls from an ambulatory ?  NOTE:  applies to high risk patients requiring additional follow-up. No   Did the patient feel the follow up calls were helpful during their recovery period? Yes          SMOOTH DAWSON - Registered Nurse

## 2022-09-27 ENCOUNTER — HOSPITAL ENCOUNTER (OUTPATIENT)
Dept: RESPIRATORY THERAPY | Facility: HOSPITAL | Age: 77
Discharge: HOME | End: 2022-09-27
Admitting: THORACIC SURGERY (CARDIOTHORACIC VASCULAR SURGERY)

## 2022-09-27 DIAGNOSIS — R91.1 LUNG NODULE: ICD-10-CM

## 2022-09-27 PROCEDURE — 94726 PLETHYSMOGRAPHY LUNG VOLUMES: CPT

## 2022-09-27 PROCEDURE — 94060 EVALUATION OF WHEEZING: CPT

## 2022-09-27 PROCEDURE — 94640 AIRWAY INHALATION TREATMENT: CPT

## 2022-09-27 PROCEDURE — 94729 DIFFUSING CAPACITY: CPT

## 2022-09-27 RX ORDER — ALBUTEROL SULFATE 2.5 MG/3ML
2.5 SOLUTION RESPIRATORY (INHALATION) ONCE
Status: COMPLETED | OUTPATIENT
Start: 2022-09-27 | End: 2022-09-27

## 2022-09-27 RX ADMIN — ALBUTEROL SULFATE 2.5 MG: 2.5 SOLUTION RESPIRATORY (INHALATION) at 08:40

## 2022-09-30 ENCOUNTER — HOSPITAL ENCOUNTER (OUTPATIENT)
Dept: PET IMAGING | Facility: HOSPITAL | Age: 77
Discharge: HOME | End: 2022-09-30

## 2022-09-30 DIAGNOSIS — R91.1 LUNG NODULE: ICD-10-CM

## 2022-09-30 LAB
GLUCOSE BLDC GLUCOMTR-MCNC: 91 MG/DL (ref 70–130)
MAXIMAL PREDICTED HEART RATE: 144 BPM
STRESS TARGET HR: 122 BPM

## 2022-09-30 PROCEDURE — A9552 F18 FDG: HCPCS | Performed by: THORACIC SURGERY (CARDIOTHORACIC VASCULAR SURGERY)

## 2022-09-30 PROCEDURE — 0 FLUDEOXYGLUCOSE F18 SOLUTION: Performed by: THORACIC SURGERY (CARDIOTHORACIC VASCULAR SURGERY)

## 2022-09-30 PROCEDURE — 78815 PET IMAGE W/CT SKULL-THIGH: CPT

## 2022-09-30 PROCEDURE — 82962 GLUCOSE BLOOD TEST: CPT

## 2022-09-30 PROCEDURE — 93248 EXT ECG>7D<15D REV&INTERPJ: CPT | Performed by: INTERNAL MEDICINE

## 2022-09-30 RX ADMIN — FLUDEOXYGLUCOSE F18 1 DOSE: 300 INJECTION INTRAVENOUS at 10:00

## 2022-10-05 NOTE — PROGRESS NOTES
[facility] Radiation Oncology   Follow Up    Chief Complaint  Hypermetabolic lung nodule      Diagnosis: Hypermetabolic lung nodule    Overall Stage if lung primary would be 1A2 disease, however no pathology      Pacemaker: no  Prior History of Radiation: no  Contraindications to Radiation: no  Patient Requires Pregnancy Test: No, patient is female and >55 years and/or has undergone hysterectomy    HPI:    Aliya Villaseñor presents in follow-up for discussion regarding her recently completed PET/CT and PFTs.    Briefly, the patient has a past medical history of hypertension, anxiety, COPD, previous melanoma of the right lower extremity surgically resected without adjuvant treatment.  She presented to the [facility] ED on 9/1 with nearly a year long history of dizziness/lightheadedness/double vision which has been worsening.  So far the cause of these episodes have not been identified.  MRI of the brain, CT of the chest, Doppler carotid ultrasound, echocardiogram, and Holter evaluation have not found an obvious cause.  She is scheduled to see neurology regarding this in January 2023.    On CT chest 9/1/2022 a 10 mm ill-defined left sided nodule/GGO was found.  She was evaluated in multidisciplinary clinic at which time PET/CT and PFTs were ordered.  On PET however, the left sided lesion appears to have resolved, however a hypermetabolic right upper lobe pleural-based lesion was identified.       Imaging:    CT head 9/1/2022     IMPRESSION:  Small vessel ischemic disease and scattered lacunar infarcts  involving the corona radiata are noted bilaterally. There is no evidence  of acute infarction or hemorrhage. Further evaluation could be performed  with MRI examination of the brain as indicated.       MRI internal auditory canal 9/1/2022     IMPRESSION:  1. Tiny 6 mm to 7 mm focus of acute-to-subacute infarct in the left  corona radiata.  2. Moderately severe changes of bilateral small vessel white matter  ischemic  disease.  3. No intracranial hemorrhage is seen.  4. No abnormalities are seen in the internal auditory canals.       CT chest with contrast 9/1/2022     IMPRESSION:  1. There is an approximately 10 mm Ill-defined nodular density with some  surrounding ground glass opacity in the left upper lobe.  2. There are no previous studies available for comparison.  3. This finding is indeterminate for malignancy. Consider a short-term  follow-up CT of the chest in approximately 6 weeks to 8 weeks.      PET/CT 9/30/2022    IMPRESSION:  1. Hypermetabolic 1.5 x 1.1 cm right posterior apical pleural-based  nodule. There is no hypermetabolic lymphadenopathy.  2. Near-complete resolution of the mixed density left upper lobe  airspace opacity. Reevaluation is recommended with a noncontrasted chest  CT in 3 months.  3. Hypermetabolic activity at the L5 spinous process is suspected to be  benign and related to degenerative change or possibly posttraumatic.  Clinical correlation is needed.      Pathology:    No relevant pathology    Labs:    Lab Results   Component Value Date    CREATININE 0.48 (L) 09/02/2022               Problem List:  Patient Active Problem List   Diagnosis   • Dizziness   • CVA (cerebral vascular accident) ([facility])          Medications:  Current Outpatient Medications on File Prior to Visit   Medication Sig Dispense Refill   • ALPRAZolam (XANAX) 0.5 MG tablet Take 0.5 mg by mouth.     • aspirin (aspirin) 81 MG EC tablet Take 1 tablet by mouth Daily. 90 tablet 1   • atorvastatin (LIPITOR) 40 MG tablet Take 1 tablet by mouth Daily. 90 tablet 1   • carvedilol (COREG) 25 MG tablet Take 25 mg by mouth 2 (Two) Times a Day With Meals.     • clopidogrel (Plavix) 75 MG tablet Take 1 tablet by mouth Daily. 21 tablet 0   • estradiol (ESTRACE) 2 MG tablet Take 2 mg by mouth Daily.     • Fluticasone-Salmeterol (ADVAIR/WIXELA) 250-50 MCG/ACT DISKUS      • Fluticasone-Umeclidin-Vilant (TRELEGY ELLIPTA IN) Inhale.     • loratadine  "(CLARITIN) 10 MG tablet Take 10 mg by mouth Daily.     • multivitamin (THERAGRAN) tablet tablet Take  by mouth.     • valsartan (DIOVAN) 80 MG tablet Take 160 mg by mouth 2 (Two) Times a Day.       No current facility-administered medications on file prior to visit.          Allergies:  Allergies   Allergen Reactions   • Amlodipine Unknown - Low Severity   • Cephalosporins Unknown - Low Severity   • Ciprofloxacin Unknown - Low Severity   • Erythromycin Unknown - Low Severity   • Nefazodone Unknown - Low Severity   • Sulfamethoxazole-Trimethoprim Unknown - Low Severity   • Valsartan Unknown - Low Severity         Family History:  The patient has no family history of conditions which would be contraindications to radiation therapy      Social History:  Former Smoker 25 Pack Years    Distance From Clinic: 30 minutes - 1 hour    Patient has someone who can assist with transportation: yes      Review of Systems:    Review of Systems   Constitutional: Positive for fatigue.   Neurological: Positive for dizziness and light-headedness.   Psychiatric/Behavioral: The patient is nervous/anxious.          Vital Signs:  /92   Pulse 68   Wt 41.1 kg (90 lb 9.6 oz)   SpO2 96%   BMI 18.29 kg/m²   Estimated body mass index is 18.29 kg/m² as calculated from the following:    Height as of 9/22/22: 149.9 cm (59.02\").    Weight as of this encounter: 41.1 kg (90 lb 9.6 oz).  Pain Score    10/06/22 0835   PainSc: 0-No pain         ECOG: Restricted in physically strenuous activity but ambulatory and able to carry out work of a light or sedentary nature, e.g., light house work, office work = 1    Physical Exam  Vitals reviewed.   Constitutional:       General: She is not in acute distress.     Appearance: Normal appearance.   HENT:      Head: Normocephalic and atraumatic.   Eyes:      Extraocular Movements: Extraocular movements intact.      Pupils: Pupils are equal, round, and reactive to light.   Pulmonary:      Effort: Pulmonary " effort is normal.      Breath sounds: Normal breath sounds.   Abdominal:      General: Abdomen is flat.      Palpations: Abdomen is soft.   Musculoskeletal:      Cervical back: Normal range of motion and neck supple.   Skin:     General: Skin is warm and dry.   Neurological:      General: No focal deficit present.      Mental Status: She is alert and oriented to person, place, and time.   Psychiatric:         Mood and Affect: Mood normal.         Behavior: Behavior normal.          Result Review :  The following data was reviewed by: Jose Vo MD on 10/06/2022:  Labs: Last Creatinine   Data reviewed: Radiologic studies PET CT            Diagnoses and all orders for this visit:    1. Nodule of upper lobe of left lung (Primary)    2. Nodule of upper lobe of right lung      Assessment:    The patient is a 77-year-old female who was initially worked up for a left upper lobe approximately 1 cm lesion noted on CT chest in early September, she was evaluated in multidisciplinary thoracic tumor clinic, and ordered PET/CT and PFTs.  PET/CT demonstrated avidity on a right sided pleural-based lesion at the apex, with resolution of the left upper lobe lesion.    I discussed with the patient that biopsy and/or resection of the right upper lobe lesion was the most likely next step.  If she was not a candidate for what ever reason I would offer her SBRT.  I discussed the risks, benefits, side effects, and logistics of SBRT to this area of the body.  She is scheduled to see Dr. Wood later this morning to discuss the role of surgical management.    Plan:    -Initially left upper lobe lesion evaluated with PET/CT, left upper lobe lesion resolved but right upper lobe apex pleural-based lesion was noted to be hypermetabolic  - Patient appears to be surgical candidate, is meeting with thoracic surgery later today to discuss surgical management  -Patient is a SBRT candidate if she is not a surgical candidate     I spent 65 minutes  caring for Aliya on this date of service. This time includes time spent by me in the following activities:preparing for the visit, reviewing tests, obtaining and/or reviewing a separately obtained history, referring and communicating with other health care professionals , documenting information in the medical record, independently interpreting results and communicating that information with the patient/family/caregiver and care coordination  Follow Up   No follow-ups on file.  Patient was given instructions and counseling regarding her condition or for health maintenance advice. Please see specific information pulled into the AVS if appropriate.     Jose Vo MD

## 2022-10-06 ENCOUNTER — OFFICE VISIT (OUTPATIENT)
Dept: SURGERY | Facility: CLINIC | Age: 77
End: 2022-10-06

## 2022-10-06 ENCOUNTER — OFFICE VISIT (OUTPATIENT)
Dept: RADIATION ONCOLOGY | Facility: HOSPITAL | Age: 77
End: 2022-10-06

## 2022-10-06 ENCOUNTER — PREP FOR SURGERY (OUTPATIENT)
Dept: OTHER | Facility: HOSPITAL | Age: 77
End: 2022-10-06

## 2022-10-06 VITALS
DIASTOLIC BLOOD PRESSURE: 80 MMHG | HEIGHT: 60 IN | SYSTOLIC BLOOD PRESSURE: 130 MMHG | OXYGEN SATURATION: 97 % | BODY MASS INDEX: 17.67 KG/M2 | HEART RATE: 72 BPM | WEIGHT: 90 LBS

## 2022-10-06 VITALS
DIASTOLIC BLOOD PRESSURE: 92 MMHG | SYSTOLIC BLOOD PRESSURE: 168 MMHG | WEIGHT: 90.6 LBS | BODY MASS INDEX: 18.29 KG/M2 | HEART RATE: 68 BPM | OXYGEN SATURATION: 96 %

## 2022-10-06 DIAGNOSIS — R91.1 LUNG NODULE: Primary | ICD-10-CM

## 2022-10-06 DIAGNOSIS — R91.1 NODULE OF UPPER LOBE OF LEFT LUNG: Primary | ICD-10-CM

## 2022-10-06 DIAGNOSIS — R91.1 NODULE OF UPPER LOBE OF RIGHT LUNG: ICD-10-CM

## 2022-10-06 PROCEDURE — 99205 OFFICE O/P NEW HI 60 MIN: CPT | Performed by: SURGERY

## 2022-10-06 PROCEDURE — 99215 OFFICE O/P EST HI 40 MIN: CPT | Performed by: STUDENT IN AN ORGANIZED HEALTH CARE EDUCATION/TRAINING PROGRAM

## 2022-10-06 PROCEDURE — G2212 PROLONG OUTPT/OFFICE VIS: HCPCS | Performed by: SURGERY

## 2022-10-06 RX ORDER — FLUTICASONE PROPIONATE 50 MCG
2 SPRAY, SUSPENSION (ML) NASAL DAILY
COMMUNITY
Start: 2022-08-21

## 2022-10-06 NOTE — PROGRESS NOTES
Right upper lobe nodule  THORACIC SURGERY CONSULT    REASON FOR CONSULT: Right upper lobe PET avid nodule    HISTORY OF PRESENTING ILLNESS:   Aliya Villaseñor is a 77 y.o. female significant history of COPD, melanoma of right leg s/p excision in 2019, COVID-19 infection and history of CVA (lacunar infarct). She presented to the [facility] ER on 9/1/2022 with progressively worsening dizziness and double vision.  She underwent extensive work-up which was negative.  MRI of the brain, CT of the chest, Doppler carotid ultrasound, echocardiogram, and Holter evaluation did not found an obvious cause.  She was noted to have a 1 cm ill-defined nodular density with surrounding groundglass opacity in the left upper lobe on CT chest dated 9/1/2022.  She was seen at multidisciplinary conference and we recommended following the nodule with PET/CT.  PET/CT on 9/30/2022 revealed hypermetabolic 1.5 x 1.1 cm right posterior apical pleural-based nodule with SUV of 2.9 and near complete resolution of the left upper lobe airspace opacity.    She has a good functional status and her activity limitation is due to progressively worsening dizziness.  She is scheduled to to see neurology at the end of November.  She lives alone and is independent in her activities of daily living.  She denies shortness of breath and can walk more than a block without getting short of breath.  She reported undocumented weight loss which she attributes to her recent COVID-19 infection.  She also reported double vision.  She denies fever, chills, rigors, altered bowel habits, new joint or bone pain.  She quit smoking 15 years ago.  She is around family and friends who are active smoker.  She has 25-pack-year history of active smoking.    ECOG 0    Review of Systems   Constitutional: Positive for activity change, appetite change and unexpected weight change.   HENT: Negative for tinnitus.    Eyes: Positive for visual disturbance.   Respiratory: Negative.     Cardiovascular: Negative.    Gastrointestinal: Negative.    Endocrine: Negative.    Genitourinary: Negative.    Musculoskeletal: Negative.    Skin: Negative.    Allergic/Immunologic: Negative.    Neurological: Positive for dizziness, weakness and light-headedness.   Hematological: Negative.    Psychiatric/Behavioral: Negative.         Past Medical History:   Diagnosis Date   • Cancer ([facility])    • COPD (chronic obstructive pulmonary disease) ([facility]) 2020    [facility] COPD   • Diverticulitis 2022   • Melanoma ([facility]) 2019    right leg       Past Surgical History:   Procedure Laterality Date   • APPENDECTOMY     • HYSTERECTOMY     • SKIN BIOPSY         No family history on file.    Social History     Socioeconomic History   • Marital status:    Tobacco Use   • Smoking status: Former Smoker     Years: 20.00     Types: Cigarettes   • Smokeless tobacco: Never Used   Vaping Use   • Vaping Use: Never used   Substance and Sexual Activity   • Alcohol use: Never   • Drug use: Never   • Sexual activity: Defer       (Not in a hospital admission)      Allergies   Allergen Reactions   • Amlodipine Unknown - Low Severity   • Cephalosporins Unknown - Low Severity   • Ciprofloxacin Unknown - Low Severity   • Erythromycin Unknown - Low Severity   • Nefazodone Unknown - Low Severity   • Sulfamethoxazole-Trimethoprim Unknown - Low Severity   • Valsartan Unknown - Low Severity       Objective      VITAL SIGNS:  Heart Rate:  [68-72] 72  BP: (130-168)/(80-92) 130/80    Physical Exam  Constitutional:       Appearance: Normal appearance.   HENT:      Head: Atraumatic.      Right Ear: External ear normal.      Left Ear: External ear normal.      Nose: Nose normal.      Mouth/Throat:      Mouth: Mucous membranes are dry.   Eyes:      Extraocular Movements: Extraocular movements intact.   Cardiovascular:      Rate and Rhythm: Normal rate and regular rhythm.   Pulmonary:      Effort: Pulmonary effort is normal.   Abdominal:       Palpations: Abdomen is soft.   Musculoskeletal:         General: Normal range of motion.      Cervical back: Normal range of motion.   Skin:     General: Skin is warm.   Neurological:      General: No focal deficit present.      Mental Status: She is alert and oriented to person, place, and time.         LAB RESULTS:  I have reviewed all the laboratory results in the chart.    IMAGING RESULTS:    CT chest 9/1/2022:  1. There is an approximately 10 mm Ill-defined nodular density with some  surrounding ground glass opacity in the left upper lobe.  2. There are no previous studies available for comparison.  3. This finding is indeterminate for malignancy. Consider a short-term  follow-up CT of the chest in approximately 6 weeks to 8 weeks.    PET/ CT 9/30/2022:  1. Hypermetabolic 1.5 x 1.1 cm right posterior apical pleural-based  nodule. There is no hypermetabolic lymphadenopathy.  2. Near-complete resolution of the mixed density left upper lobe  airspace opacity. Reevaluation is recommended with a noncontrasted chest  CT in 3 months.  3. Hypermetabolic activity at the L5 spinous process is suspected to be  benign and related to degenerative change or possibly posttraumatic.  Clinical correlation is needed.    MRI brain 9/1/2022:  1. Tiny 6 mm to 7 mm focus of acute-to-subacute infarct in the left  corona radiata.  2. Moderately severe changes of bilateral small vessel white matter  ischemic disease.  3. No intracranial hemorrhage is seen.  4. No abnormalities are seen in the internal auditory canals.    Cardiac testing:  Transthoracic echo 9/2/2022  · Saline test results are negative.  · Estimated right ventricular systolic pressure from tricuspid regurgitation is normal (<35 mmHg).  · Estimated left ventricular EF = 67% Left ventricular systolic function is normal.  · Left ventricular diastolic function was normal.    Pulmonary Function Test 9/27/2022:  FVC 2.2 L 100%  FEV1 1.34 L 79%  Ratio 78%  DLCO 57 %    RESULTS  REVIEW:  I have reviewed the patient's all relevant laboratory and imaging findings.     ASSESSMENT & PLAN:  Aliya Villaseñor is a 77 y.o. female with significant medical conditions as mentioned above presented to my clinic on 10/6/2022 for right upper lobe PET avid lesion..    The right upper lobe lesion has mild PET avidity.  In the context of her significant history of smoking and melanoma, we need to obtain tissue diagnosis to determine further management plan.  The lesion is located in the apical segment of the right upper lobe is not safely accessible for percutaneous biopsy.  She has good functional status and pulmonary function to tolerate transthoracic excisional biopsy.  I discussed with the patient and her daughter in detail the indication for surgery.  I offered them robotic assisted right upper lobe wedge resection and intraoperative assessment of the resected specimen.  If it turns out to be cancer I will proceed with other right upper lobe apical segmentectomy or lobectomy, which will be determined Intra-op based on her anatomy.  I will also perform mediastinal lymph node dissection as part of the procedure.    I discussed the patients findings and my recommendations with the patient. The patient was given adequate time to ask questions and all questions were answered to patient satisfaction. Thank you for this consult and allowing us to participate in the care of your patient.      Matt Wood MD  Thoracic Surgeon  [facility]        Dictated utilizing Dragon dictation at 17:19 EDT on 10/6/2022  I spent 90 minutes caring for Aliya on this date of service. This time includes time spent by me in the following activities:preparing for the visit, reviewing tests, obtaining and/or reviewing a separately obtained history, performing a medically appropriate examination and/or evaluation , counseling and educating the patient/family/caregiver, ordering medications, tests, or procedures,  documenting information in the medical record, independently interpreting results and communicating that information with the patient/family/caregiver, care coordination and More than half the time was spent in direct face-to-face evaluation and decision making.

## 2022-10-07 ENCOUNTER — NURSE NAVIGATOR (OUTPATIENT)
Dept: OTHER | Facility: HOSPITAL | Age: 77
End: 2022-10-07

## 2022-10-07 NOTE — PROGRESS NOTES
"Referral received from Dr. Wood. Called Ms. Villaseñor at home, introduced myself and explained navigational services. I previously met her and her daughter in person in the [facility] lung clinic on .  She is receptive and verbalized appreciation for navigational services.     Ms. Villaseñor met with both Dr. Vo and Dr. Wood yesterday. She has a good understanding of her treatment options although verbalized being anxious about upcoming surgery. I offered support and validated her feelings.  She wants to be able to make the decision if the biopsy if positive for cancer before he does additional surgery. She will call Dr. Wood's office to discuss the surgical plan to address her concerns. Aliya was able to teach back her plan of care.    Ms. Villaseñor is taking xanax for her anxiety and will be calling her PCP for a visit early next week. He is managing her anxiety.  She verbalized that one of her new medicines may be making her symptoms worse. She has a follow up appointment with neurology in November.  She has a cane due to her dizziness following her stroke, which she has not used yet. She denies falls.     Aliya states that her grandmother  of stomach cancer and her uncle had colon cancer. She has a personal history of melanoma to her leg, which required surgery a few years ago, although no other treatment was required.    Aliya works although has been in STD since 23 when she developed COVID. She reported that her job was stressful, although she wants to get back to work soon because she helps support her family.  She will work with her PCP to get her STD extended. Aliya stated she does not have LTD.    Aliya stated that her daughter and grandchildren live with her.  She has been driving although her daughter is there to drive her to/from appointments. She denies any transportation concerns at this time. She verbalized an adequate support system although doesn't want to \"burden\" her family when she doesn't know if she has " cancer yet.  I again offered support and validated her feelings.    We discussed advanced care planning, since she doesn't have an ACP on file. She will let me know if she wants to pursue an appointment with [facility] in the future. She states her daughter knows her wishes in regards to her medical care.    Ms. Villaseñor verbalized concerns with her insurance coverage for services. I asked Arash, our financial navigator to reach out to her to discuss in more detail.  Ms. Villaseñor denied any other financial concerns at this time.  Support services through the cancer center were discussed, including social work, which she declined at this time.    Aliya verbalized feeling better after talking with the navigator.  I will call Aliya next week although encouraged her to call me with any questions or concerns that arise.       Acuity 2

## 2022-10-14 ENCOUNTER — NURSE NAVIGATOR (OUTPATIENT)
Dept: OTHER | Facility: HOSPITAL | Age: 77
End: 2022-10-14

## 2022-10-14 NOTE — PROGRESS NOTES
Called patient at home. Her grandchild is sick with strep. She is not feeling well herself. She is headed to urgent care. She asked that I call again Monday.

## 2022-10-17 ENCOUNTER — PREP FOR SURGERY (OUTPATIENT)
Dept: OTHER | Facility: HOSPITAL | Age: 77
End: 2022-10-17

## 2022-10-17 DIAGNOSIS — R79.1 ABNORMAL COAGULATION PROFILE: ICD-10-CM

## 2022-10-17 DIAGNOSIS — R91.1 NODULE OF UPPER LOBE OF LEFT LUNG: Primary | ICD-10-CM

## 2022-10-17 RX ORDER — SODIUM CHLORIDE 0.9 % (FLUSH) 0.9 %
10 SYRINGE (ML) INJECTION EVERY 12 HOURS SCHEDULED
Status: CANCELLED | OUTPATIENT
Start: 2022-10-17

## 2022-10-17 RX ORDER — ONDANSETRON 2 MG/ML
4 INJECTION INTRAMUSCULAR; INTRAVENOUS EVERY 6 HOURS PRN
Status: CANCELLED | OUTPATIENT
Start: 2022-10-17

## 2022-10-17 RX ORDER — SODIUM CHLORIDE 0.9 % (FLUSH) 0.9 %
10 SYRINGE (ML) INJECTION AS NEEDED
Status: CANCELLED | OUTPATIENT
Start: 2022-10-17

## 2022-10-18 ENCOUNTER — NURSE NAVIGATOR (OUTPATIENT)
Dept: OTHER | Facility: HOSPITAL | Age: 77
End: 2022-10-18

## 2022-10-18 NOTE — PROGRESS NOTES
Spoke with patient on telephone 10/17/22.  She stated she is felling better. She went to urgent care and her strep test was negative. She states she likely had a virus.     The patient met with her PCP on 10/17/22.  She stated they discussed extending her STD, as it currently expires the day she is scheduled for surgery 10/26/22.  Discussed that the surgeon's office can assist with that extension. Provided thoracic surgery office number. She verbalized appreciation and will call them.    We discussed her upcoming surgery. She is no longer taking Plavix, although is taking a baby aspirin daily. We discussed appropriate the timeframe to hold the ASA. She had several other questions about her post-operative care, which were discussed.  She will call thoracic surgery office to answer additional procedure specific questions.     The patient verbalized her feelings of ongoing depression and anxiety. She stated she has discussed this with her PCP on multiple occasions and is currently taking xanax at night, which her PCP prescribes.  She was encouraged to contact her PCP if she feels her current medication needs to be adjusted or if it is no longer effective to manage her symptoms.  The patient verbalized understanding and agreed to do so.  The patient stated that her  passed away 16 years ago. Her daughter, son in law and grandchildren moved in with her at that time. Since then, her daughter and son in law got a divorce although her daughter and grandchildren still live with her.  She verbalized concern about still being off work and how she will continue to help support her daughter and grandchildren.  She verbalized concerns of losing her condo if she doesn't return to work soon.  I provided active listening and emotional support, validating and normalizing pt's feelings. PHQ9 assessment was completed with a score of 22.  She states her fatigue/lack of energy is a linger effect from her COVID infection. She  "denies suicidal ideation or suicidal plan. We discussed supportive oncology services including [facility]. She stated \"I need to wait\".   She stated she is \"taking care of business\", getting her healthcare plan organized prior to her upcoming surgery. The patient verbalized that she believes her symptoms will improve once her surgery occurs and she knows if she has a cancer or not.     Pt expressed her gratitude for my support, stating she felt better after our conversation.  Follow up call was schedule for 1 week. Patient will call with any questions or concerns that arise prior to the follow up call.      "

## 2022-10-18 NOTE — PROGRESS NOTES
"Rec'd voicemail from patient. She needs the number for Dr. Vo to complete the STD paperwork. Tct her, she said she is feeling about the same as yesterday although stated \"I'm picking myself and moving on\".  She found the number to Dr. Vo. She will have her employer call Dr. Vo. Encouraged her to have employer also call Dr. Wood's office as well, since her surgery is the reason that her STD needs to be extended. She will do so.  Denied any other needs/issues. I will call next week. She will call if needed before then      "

## 2022-10-20 ENCOUNTER — PRE-ADMISSION TESTING (OUTPATIENT)
Dept: PREADMISSION TESTING | Facility: HOSPITAL | Age: 77
End: 2022-10-20

## 2022-10-20 VITALS
HEIGHT: 60 IN | SYSTOLIC BLOOD PRESSURE: 163 MMHG | OXYGEN SATURATION: 100 % | TEMPERATURE: 96.9 F | BODY MASS INDEX: 17.87 KG/M2 | RESPIRATION RATE: 16 BRPM | WEIGHT: 91 LBS | DIASTOLIC BLOOD PRESSURE: 74 MMHG | HEART RATE: 63 BPM

## 2022-10-20 DIAGNOSIS — R91.1 NODULE OF UPPER LOBE OF LEFT LUNG: ICD-10-CM

## 2022-10-20 LAB
ABO GROUP BLD: NORMAL
ALBUMIN SERPL-MCNC: 4.2 G/DL (ref 3.5–5.2)
ALBUMIN/GLOB SERPL: 1.9 G/DL
ALP SERPL-CCNC: 104 U/L (ref 39–117)
ALT SERPL W P-5'-P-CCNC: 62 U/L (ref 1–33)
ANION GAP SERPL CALCULATED.3IONS-SCNC: 9.5 MMOL/L (ref 5–15)
APTT PPP: 31.3 SECONDS (ref 22.7–35.4)
AST SERPL-CCNC: 35 U/L (ref 1–32)
BILIRUB SERPL-MCNC: 0.3 MG/DL (ref 0–1.2)
BLD GP AB SCN SERPL QL: NEGATIVE
BUN SERPL-MCNC: 17 MG/DL (ref 8–23)
BUN/CREAT SERPL: 21 (ref 7–25)
CALCIUM SPEC-SCNC: 9.2 MG/DL (ref 8.6–10.5)
CHLORIDE SERPL-SCNC: 101 MMOL/L (ref 98–107)
CO2 SERPL-SCNC: 29.5 MMOL/L (ref 22–29)
CREAT SERPL-MCNC: 0.81 MG/DL (ref 0.57–1)
DEPRECATED RDW RBC AUTO: 41.9 FL (ref 37–54)
EGFRCR SERPLBLD CKD-EPI 2021: 75.3 ML/MIN/1.73
ERYTHROCYTE [DISTWIDTH] IN BLOOD BY AUTOMATED COUNT: 13.2 % (ref 12.3–15.4)
GLOBULIN UR ELPH-MCNC: 2.2 GM/DL
GLUCOSE SERPL-MCNC: 84 MG/DL (ref 65–99)
HCT VFR BLD AUTO: 34.6 % (ref 34–46.6)
HGB BLD-MCNC: 11.9 G/DL (ref 12–15.9)
INR PPP: 1.07 (ref 0.9–1.1)
MCH RBC QN AUTO: 29.8 PG (ref 26.6–33)
MCHC RBC AUTO-ENTMCNC: 34.4 G/DL (ref 31.5–35.7)
MCV RBC AUTO: 86.5 FL (ref 79–97)
PLATELET # BLD AUTO: 244 10*3/MM3 (ref 140–450)
PMV BLD AUTO: 10.2 FL (ref 6–12)
POTASSIUM SERPL-SCNC: 3.5 MMOL/L (ref 3.5–5.2)
PROT SERPL-MCNC: 6.4 G/DL (ref 6–8.5)
PROTHROMBIN TIME: 14 SECONDS (ref 11.7–14.2)
RBC # BLD AUTO: 4 10*6/MM3 (ref 3.77–5.28)
RH BLD: POSITIVE
SODIUM SERPL-SCNC: 140 MMOL/L (ref 136–145)
T&S EXPIRATION DATE: NORMAL
WBC NRBC COR # BLD: 5.76 10*3/MM3 (ref 3.4–10.8)

## 2022-10-20 PROCEDURE — 86900 BLOOD TYPING SEROLOGIC ABO: CPT

## 2022-10-20 PROCEDURE — 36415 COLL VENOUS BLD VENIPUNCTURE: CPT

## 2022-10-20 PROCEDURE — 85730 THROMBOPLASTIN TIME PARTIAL: CPT

## 2022-10-20 PROCEDURE — 86901 BLOOD TYPING SEROLOGIC RH(D): CPT

## 2022-10-20 PROCEDURE — 86850 RBC ANTIBODY SCREEN: CPT

## 2022-10-20 PROCEDURE — 80053 COMPREHEN METABOLIC PANEL: CPT

## 2022-10-20 PROCEDURE — 85610 PROTHROMBIN TIME: CPT

## 2022-10-20 PROCEDURE — 85027 COMPLETE CBC AUTOMATED: CPT

## 2022-10-24 ENCOUNTER — NURSE NAVIGATOR (OUTPATIENT)
Dept: OTHER | Facility: HOSPITAL | Age: 77
End: 2022-10-24

## 2022-10-24 NOTE — PROGRESS NOTES
"Called patient at home. She is doing well.  Is \"getting things in order\" before surgery 10/26. He daughter took off work and will be taking her. Stated she may have more extensive surgery if they find cancer.     Patient stated she is doing ok.  She rec'd a voicemail from Arash,  although hasn't been able ot speak with him directly. I offered to have him call her this week, although she prefers that he call next week after her surgery.    The patient denies any additional needs/services at this time.    I will call next week-she will call me if the need arises before then  "

## 2022-10-25 ENCOUNTER — ANESTHESIA EVENT (OUTPATIENT)
Dept: PERIOP | Facility: HOSPITAL | Age: 77
End: 2022-10-25

## 2022-10-26 ENCOUNTER — APPOINTMENT (OUTPATIENT)
Dept: GENERAL RADIOLOGY | Facility: HOSPITAL | Age: 77
End: 2022-10-26

## 2022-10-26 ENCOUNTER — ANESTHESIA (OUTPATIENT)
Dept: PERIOP | Facility: HOSPITAL | Age: 77
End: 2022-10-26

## 2022-10-26 ENCOUNTER — HOSPITAL ENCOUNTER (INPATIENT)
Facility: HOSPITAL | Age: 77
LOS: 12 days | Discharge: HOME HEALTH | End: 2022-11-07
Attending: SURGERY | Admitting: SURGERY

## 2022-10-26 DIAGNOSIS — R91.1 LUNG NODULE: ICD-10-CM

## 2022-10-26 DIAGNOSIS — I63.9 CEREBROVASCULAR ACCIDENT (CVA), UNSPECIFIED MECHANISM: ICD-10-CM

## 2022-10-26 DIAGNOSIS — R91.1 NODULE OF UPPER LOBE OF LEFT LUNG: ICD-10-CM

## 2022-10-26 DIAGNOSIS — E44.0 MODERATE MALNUTRITION: Primary | ICD-10-CM

## 2022-10-26 LAB
ANION GAP SERPL CALCULATED.3IONS-SCNC: 9.7 MMOL/L (ref 5–15)
BUN SERPL-MCNC: 9 MG/DL (ref 8–23)
BUN/CREAT SERPL: 13.6 (ref 7–25)
CALCIUM SPEC-SCNC: 8.3 MG/DL (ref 8.6–10.5)
CHLORIDE SERPL-SCNC: 104 MMOL/L (ref 98–107)
CO2 SERPL-SCNC: 26.3 MMOL/L (ref 22–29)
CREAT SERPL-MCNC: 0.66 MG/DL (ref 0.57–1)
DEPRECATED RDW RBC AUTO: 42.5 FL (ref 37–54)
EGFRCR SERPLBLD CKD-EPI 2021: 91 ML/MIN/1.73
ERYTHROCYTE [DISTWIDTH] IN BLOOD BY AUTOMATED COUNT: 13.3 % (ref 12.3–15.4)
GLUCOSE SERPL-MCNC: 134 MG/DL (ref 65–99)
HCT VFR BLD AUTO: 30.9 % (ref 34–46.6)
HGB BLD-MCNC: 10.6 G/DL (ref 12–15.9)
MCH RBC QN AUTO: 30 PG (ref 26.6–33)
MCHC RBC AUTO-ENTMCNC: 34.3 G/DL (ref 31.5–35.7)
MCV RBC AUTO: 87.5 FL (ref 79–97)
PLATELET # BLD AUTO: 222 10*3/MM3 (ref 140–450)
PMV BLD AUTO: 10.9 FL (ref 6–12)
POTASSIUM SERPL-SCNC: 3.6 MMOL/L (ref 3.5–5.2)
RBC # BLD AUTO: 3.53 10*6/MM3 (ref 3.77–5.28)
SODIUM SERPL-SCNC: 140 MMOL/L (ref 136–145)
WBC NRBC COR # BLD: 6.79 10*3/MM3 (ref 3.4–10.8)

## 2022-10-26 PROCEDURE — 25010000002 FENTANYL CITRATE (PF) 100 MCG/2ML SOLUTION: Performed by: NURSE ANESTHETIST, CERTIFIED REGISTERED

## 2022-10-26 PROCEDURE — 25010000002 CEFOXITIN PER 1 G: Performed by: SURGERY

## 2022-10-26 PROCEDURE — 0BJ08ZZ INSPECTION OF TRACHEOBRONCHIAL TREE, VIA NATURAL OR ARTIFICIAL OPENING ENDOSCOPIC: ICD-10-PCS | Performed by: SURGERY

## 2022-10-26 PROCEDURE — 32674 THORACOSCOPY LYMPH NODE EXC: CPT | Performed by: SURGERY

## 2022-10-26 PROCEDURE — C9290 INJ, BUPIVACAINE LIPOSOME: HCPCS | Performed by: ANESTHESIOLOGY

## 2022-10-26 PROCEDURE — 87176 TISSUE HOMOGENIZATION CULTR: CPT | Performed by: SURGERY

## 2022-10-26 PROCEDURE — 25010000002 ONDANSETRON PER 1 MG: Performed by: NURSE ANESTHETIST, CERTIFIED REGISTERED

## 2022-10-26 PROCEDURE — 32674 THORACOSCOPY LYMPH NODE EXC: CPT | Performed by: REGISTERED NURSE

## 2022-10-26 PROCEDURE — C1894 INTRO/SHEATH, NON-LASER: HCPCS | Performed by: SURGERY

## 2022-10-26 PROCEDURE — 88307 TISSUE EXAM BY PATHOLOGIST: CPT | Performed by: SURGERY

## 2022-10-26 PROCEDURE — 25010000002 HYDRALAZINE PER 20 MG: Performed by: ANESTHESIOLOGY

## 2022-10-26 PROCEDURE — 32608 THORACOSCOPY W/BX NODULE: CPT | Performed by: REGISTERED NURSE

## 2022-10-26 PROCEDURE — 87070 CULTURE OTHR SPECIMN AEROBIC: CPT | Performed by: SURGERY

## 2022-10-26 PROCEDURE — 8E0W4CZ ROBOTIC ASSISTED PROCEDURE OF TRUNK REGION, PERCUTANEOUS ENDOSCOPIC APPROACH: ICD-10-PCS | Performed by: SURGERY

## 2022-10-26 PROCEDURE — 88332 PATH CONSLTJ SURG EA ADD BLK: CPT | Performed by: SURGERY

## 2022-10-26 PROCEDURE — 25010000002 HYDROMORPHONE PER 4 MG: Performed by: NURSE ANESTHETIST, CERTIFIED REGISTERED

## 2022-10-26 PROCEDURE — 76942 ECHO GUIDE FOR BIOPSY: CPT | Performed by: SURGERY

## 2022-10-26 PROCEDURE — 88305 TISSUE EXAM BY PATHOLOGIST: CPT | Performed by: SURGERY

## 2022-10-26 PROCEDURE — 07B74ZX EXCISION OF THORAX LYMPHATIC, PERCUTANEOUS ENDOSCOPIC APPROACH, DIAGNOSTIC: ICD-10-PCS | Performed by: SURGERY

## 2022-10-26 PROCEDURE — 25010000002 FENTANYL CITRATE (PF) 50 MCG/ML SOLUTION: Performed by: ANESTHESIOLOGY

## 2022-10-26 PROCEDURE — 71045 X-RAY EXAM CHEST 1 VIEW: CPT

## 2022-10-26 PROCEDURE — 25010000002 MAGNESIUM SULFATE PER 500 MG OF MAGNESIUM: Performed by: NURSE ANESTHETIST, CERTIFIED REGISTERED

## 2022-10-26 PROCEDURE — 25010000002 PROPOFOL 10 MG/ML EMULSION: Performed by: NURSE ANESTHETIST, CERTIFIED REGISTERED

## 2022-10-26 PROCEDURE — C1729 CATH, DRAINAGE: HCPCS | Performed by: SURGERY

## 2022-10-26 PROCEDURE — 88331 PATH CONSLTJ SURG 1 BLK 1SPC: CPT | Performed by: SURGERY

## 2022-10-26 PROCEDURE — 0BBC4ZZ EXCISION OF RIGHT UPPER LUNG LOBE, PERCUTANEOUS ENDOSCOPIC APPROACH: ICD-10-PCS | Performed by: SURGERY

## 2022-10-26 PROCEDURE — 25010000002 MIDAZOLAM PER 1 MG: Performed by: ANESTHESIOLOGY

## 2022-10-26 PROCEDURE — 87075 CULTR BACTERIA EXCEPT BLOOD: CPT | Performed by: SURGERY

## 2022-10-26 PROCEDURE — 25010000002 DEXAMETHASONE SODIUM PHOSPHATE 20 MG/5ML SOLUTION: Performed by: NURSE ANESTHETIST, CERTIFIED REGISTERED

## 2022-10-26 PROCEDURE — 87205 SMEAR GRAM STAIN: CPT | Performed by: SURGERY

## 2022-10-26 PROCEDURE — 0 BUPIVACAINE LIPOSOME 1.3 % SUSPENSION: Performed by: ANESTHESIOLOGY

## 2022-10-26 PROCEDURE — 80048 BASIC METABOLIC PNL TOTAL CA: CPT | Performed by: SURGERY

## 2022-10-26 PROCEDURE — 25010000002 CEFOXITIN PER 1 G: Performed by: NURSE ANESTHETIST, CERTIFIED REGISTERED

## 2022-10-26 PROCEDURE — 32608 THORACOSCOPY W/BX NODULE: CPT | Performed by: SURGERY

## 2022-10-26 PROCEDURE — 88312 SPECIAL STAINS GROUP 1: CPT | Performed by: SURGERY

## 2022-10-26 PROCEDURE — 3E0T3BZ INTRODUCTION OF ANESTHETIC AGENT INTO PERIPHERAL NERVES AND PLEXI, PERCUTANEOUS APPROACH: ICD-10-PCS | Performed by: SURGERY

## 2022-10-26 PROCEDURE — 85027 COMPLETE CBC AUTOMATED: CPT | Performed by: SURGERY

## 2022-10-26 PROCEDURE — 87102 FUNGUS ISOLATION CULTURE: CPT | Performed by: SURGERY

## 2022-10-26 DEVICE — SUREFORM 45 RELOAD BLUE
Type: IMPLANTABLE DEVICE | Site: CHEST | Status: FUNCTIONAL
Brand: SUREFORM

## 2022-10-26 DEVICE — SUREFORM 45 RELOAD GREEN
Type: IMPLANTABLE DEVICE | Site: CHEST | Status: FUNCTIONAL
Brand: SUREFORM

## 2022-10-26 RX ORDER — SODIUM CHLORIDE 9 MG/ML
100 INJECTION, SOLUTION INTRAVENOUS CONTINUOUS
Status: DISCONTINUED | OUTPATIENT
Start: 2022-10-26 | End: 2022-11-01

## 2022-10-26 RX ORDER — LIDOCAINE HYDROCHLORIDE 20 MG/ML
INJECTION, SOLUTION INFILTRATION; PERINEURAL AS NEEDED
Status: DISCONTINUED | OUTPATIENT
Start: 2022-10-26 | End: 2022-10-26 | Stop reason: SURG

## 2022-10-26 RX ORDER — CEFOXITIN 2 G/1
INJECTION, POWDER, FOR SOLUTION INTRAVENOUS AS NEEDED
Status: DISCONTINUED | OUTPATIENT
Start: 2022-10-26 | End: 2022-10-26 | Stop reason: SURG

## 2022-10-26 RX ORDER — DIPHENHYDRAMINE HCL 25 MG
25 CAPSULE ORAL EVERY 4 HOURS PRN
Status: DISCONTINUED | OUTPATIENT
Start: 2022-10-26 | End: 2022-10-26

## 2022-10-26 RX ORDER — SODIUM CHLORIDE 0.9 % (FLUSH) 0.9 %
3 SYRINGE (ML) INJECTION EVERY 12 HOURS SCHEDULED
Status: DISCONTINUED | OUTPATIENT
Start: 2022-10-26 | End: 2022-10-26 | Stop reason: HOSPADM

## 2022-10-26 RX ORDER — OXYCODONE HYDROCHLORIDE 5 MG/1
5 TABLET ORAL EVERY 4 HOURS PRN
Status: DISCONTINUED | OUTPATIENT
Start: 2022-10-26 | End: 2022-10-31

## 2022-10-26 RX ORDER — GABAPENTIN 100 MG/1
100 CAPSULE ORAL EVERY 8 HOURS SCHEDULED
Status: DISCONTINUED | OUTPATIENT
Start: 2022-10-26 | End: 2022-11-07 | Stop reason: HOSPADM

## 2022-10-26 RX ORDER — TRAMADOL HYDROCHLORIDE 50 MG/1
25 TABLET ORAL EVERY 8 HOURS
Status: DISCONTINUED | OUTPATIENT
Start: 2022-10-26 | End: 2022-10-26

## 2022-10-26 RX ORDER — ONDANSETRON 2 MG/ML
4 INJECTION INTRAMUSCULAR; INTRAVENOUS ONCE AS NEEDED
Status: COMPLETED | OUTPATIENT
Start: 2022-10-26 | End: 2022-10-26

## 2022-10-26 RX ORDER — ROCURONIUM BROMIDE 10 MG/ML
INJECTION, SOLUTION INTRAVENOUS AS NEEDED
Status: DISCONTINUED | OUTPATIENT
Start: 2022-10-26 | End: 2022-10-26 | Stop reason: SURG

## 2022-10-26 RX ORDER — HYDRALAZINE HYDROCHLORIDE 20 MG/ML
5 INJECTION INTRAMUSCULAR; INTRAVENOUS
Status: DISCONTINUED | OUTPATIENT
Start: 2022-10-26 | End: 2022-10-26 | Stop reason: HOSPADM

## 2022-10-26 RX ORDER — FLUTICASONE PROPIONATE 50 MCG
2 SPRAY, SUSPENSION (ML) NASAL DAILY
Status: DISCONTINUED | OUTPATIENT
Start: 2022-10-26 | End: 2022-11-07 | Stop reason: HOSPADM

## 2022-10-26 RX ORDER — ATORVASTATIN CALCIUM 20 MG/1
40 TABLET, FILM COATED ORAL DAILY
Status: DISCONTINUED | OUTPATIENT
Start: 2022-10-26 | End: 2022-11-07 | Stop reason: HOSPADM

## 2022-10-26 RX ORDER — NITROGLYCERIN 0.4 MG/1
0.4 TABLET SUBLINGUAL
Status: DISCONTINUED | OUTPATIENT
Start: 2022-10-26 | End: 2022-11-07 | Stop reason: HOSPADM

## 2022-10-26 RX ORDER — FENTANYL CITRATE 50 UG/ML
INJECTION, SOLUTION INTRAMUSCULAR; INTRAVENOUS AS NEEDED
Status: DISCONTINUED | OUTPATIENT
Start: 2022-10-26 | End: 2022-10-26 | Stop reason: SURG

## 2022-10-26 RX ORDER — SODIUM CHLORIDE, SODIUM LACTATE, POTASSIUM CHLORIDE, CALCIUM CHLORIDE 600; 310; 30; 20 MG/100ML; MG/100ML; MG/100ML; MG/100ML
9 INJECTION, SOLUTION INTRAVENOUS CONTINUOUS
Status: DISCONTINUED | OUTPATIENT
Start: 2022-10-26 | End: 2022-10-26

## 2022-10-26 RX ORDER — VALSARTAN 160 MG/1
160 TABLET ORAL 2 TIMES DAILY
Status: DISCONTINUED | OUTPATIENT
Start: 2022-10-26 | End: 2022-11-07 | Stop reason: HOSPADM

## 2022-10-26 RX ORDER — BUPIVACAINE HYDROCHLORIDE 2.5 MG/ML
INJECTION, SOLUTION EPIDURAL; INFILTRATION; INTRACAUDAL
Status: COMPLETED | OUTPATIENT
Start: 2022-10-26 | End: 2022-10-26

## 2022-10-26 RX ORDER — SODIUM CHLORIDE 0.9 % (FLUSH) 0.9 %
10 SYRINGE (ML) INJECTION EVERY 12 HOURS SCHEDULED
Status: DISCONTINUED | OUTPATIENT
Start: 2022-10-26 | End: 2022-10-26 | Stop reason: HOSPADM

## 2022-10-26 RX ORDER — CARVEDILOL 25 MG/1
25 TABLET ORAL 2 TIMES DAILY WITH MEALS
Status: DISCONTINUED | OUTPATIENT
Start: 2022-10-26 | End: 2022-10-30

## 2022-10-26 RX ORDER — ONDANSETRON 2 MG/ML
INJECTION INTRAMUSCULAR; INTRAVENOUS AS NEEDED
Status: DISCONTINUED | OUTPATIENT
Start: 2022-10-26 | End: 2022-10-26 | Stop reason: SURG

## 2022-10-26 RX ORDER — PROPOFOL 10 MG/ML
VIAL (ML) INTRAVENOUS AS NEEDED
Status: DISCONTINUED | OUTPATIENT
Start: 2022-10-26 | End: 2022-10-26 | Stop reason: SURG

## 2022-10-26 RX ORDER — SODIUM CHLORIDE, SODIUM LACTATE, POTASSIUM CHLORIDE, CALCIUM CHLORIDE 600; 310; 30; 20 MG/100ML; MG/100ML; MG/100ML; MG/100ML
INJECTION, SOLUTION INTRAVENOUS CONTINUOUS PRN
Status: DISCONTINUED | OUTPATIENT
Start: 2022-10-26 | End: 2022-10-26 | Stop reason: SURG

## 2022-10-26 RX ORDER — LIDOCAINE HYDROCHLORIDE 10 MG/ML
0.5 INJECTION, SOLUTION EPIDURAL; INFILTRATION; INTRACAUDAL; PERINEURAL ONCE AS NEEDED
Status: DISCONTINUED | OUTPATIENT
Start: 2022-10-26 | End: 2022-10-26 | Stop reason: HOSPADM

## 2022-10-26 RX ORDER — HYDROMORPHONE HYDROCHLORIDE 1 MG/ML
0.25 INJECTION, SOLUTION INTRAMUSCULAR; INTRAVENOUS; SUBCUTANEOUS
Status: DISCONTINUED | OUTPATIENT
Start: 2022-10-26 | End: 2022-10-26

## 2022-10-26 RX ORDER — CETIRIZINE HYDROCHLORIDE 10 MG/1
10 TABLET ORAL DAILY
Status: DISCONTINUED | OUTPATIENT
Start: 2022-10-26 | End: 2022-11-07 | Stop reason: HOSPADM

## 2022-10-26 RX ORDER — MIDAZOLAM HYDROCHLORIDE 1 MG/ML
INJECTION INTRAMUSCULAR; INTRAVENOUS AS NEEDED
Status: COMPLETED | OUTPATIENT
Start: 2022-10-26 | End: 2022-10-26

## 2022-10-26 RX ORDER — MIDAZOLAM HYDROCHLORIDE 1 MG/ML
0.5 INJECTION INTRAMUSCULAR; INTRAVENOUS
Status: DISCONTINUED | OUTPATIENT
Start: 2022-10-26 | End: 2022-10-26 | Stop reason: HOSPADM

## 2022-10-26 RX ORDER — LABETALOL HYDROCHLORIDE 5 MG/ML
5 INJECTION, SOLUTION INTRAVENOUS
Status: DISCONTINUED | OUTPATIENT
Start: 2022-10-26 | End: 2022-10-26 | Stop reason: HOSPADM

## 2022-10-26 RX ORDER — SODIUM CHLORIDE 0.9 % (FLUSH) 0.9 %
3-10 SYRINGE (ML) INJECTION AS NEEDED
Status: DISCONTINUED | OUTPATIENT
Start: 2022-10-26 | End: 2022-10-26 | Stop reason: HOSPADM

## 2022-10-26 RX ORDER — SODIUM CHLORIDE 0.9 % (FLUSH) 0.9 %
10 SYRINGE (ML) INJECTION AS NEEDED
Status: DISCONTINUED | OUTPATIENT
Start: 2022-10-26 | End: 2022-10-26 | Stop reason: HOSPADM

## 2022-10-26 RX ORDER — NALOXONE HCL 0.4 MG/ML
0.4 VIAL (ML) INJECTION
Status: DISCONTINUED | OUTPATIENT
Start: 2022-10-26 | End: 2022-10-26

## 2022-10-26 RX ORDER — DIAZEPAM 2 MG/1
2 TABLET ORAL EVERY 6 HOURS PRN
Status: DISCONTINUED | OUTPATIENT
Start: 2022-10-26 | End: 2022-11-07 | Stop reason: HOSPADM

## 2022-10-26 RX ORDER — FAMOTIDINE 10 MG/ML
20 INJECTION, SOLUTION INTRAVENOUS ONCE
Status: COMPLETED | OUTPATIENT
Start: 2022-10-26 | End: 2022-10-26

## 2022-10-26 RX ORDER — DEXAMETHASONE SODIUM PHOSPHATE 4 MG/ML
INJECTION, SOLUTION INTRA-ARTICULAR; INTRALESIONAL; INTRAMUSCULAR; INTRAVENOUS; SOFT TISSUE AS NEEDED
Status: DISCONTINUED | OUTPATIENT
Start: 2022-10-26 | End: 2022-10-26 | Stop reason: SURG

## 2022-10-26 RX ORDER — ACETAMINOPHEN 500 MG
1000 TABLET ORAL EVERY 8 HOURS
Status: DISCONTINUED | OUTPATIENT
Start: 2022-10-26 | End: 2022-10-31 | Stop reason: SDUPTHER

## 2022-10-26 RX ORDER — DIPHENHYDRAMINE HYDROCHLORIDE 50 MG/ML
25 INJECTION INTRAMUSCULAR; INTRAVENOUS EVERY 4 HOURS PRN
Status: DISCONTINUED | OUTPATIENT
Start: 2022-10-26 | End: 2022-10-26

## 2022-10-26 RX ORDER — ONDANSETRON 2 MG/ML
4 INJECTION INTRAMUSCULAR; INTRAVENOUS EVERY 6 HOURS PRN
Status: DISCONTINUED | OUTPATIENT
Start: 2022-10-26 | End: 2022-10-26

## 2022-10-26 RX ORDER — ENOXAPARIN SODIUM 100 MG/ML
40 INJECTION SUBCUTANEOUS NIGHTLY
Status: DISCONTINUED | OUTPATIENT
Start: 2022-10-27 | End: 2022-10-31

## 2022-10-26 RX ORDER — ALPRAZOLAM 0.5 MG/1
0.5 TABLET ORAL NIGHTLY PRN
Status: DISCONTINUED | OUTPATIENT
Start: 2022-10-26 | End: 2022-11-07 | Stop reason: HOSPADM

## 2022-10-26 RX ORDER — FENTANYL CITRATE 50 UG/ML
INJECTION, SOLUTION INTRAMUSCULAR; INTRAVENOUS AS NEEDED
Status: COMPLETED | OUTPATIENT
Start: 2022-10-26 | End: 2022-10-26

## 2022-10-26 RX ORDER — FENTANYL CITRATE 50 UG/ML
50 INJECTION, SOLUTION INTRAMUSCULAR; INTRAVENOUS
Status: DISCONTINUED | OUTPATIENT
Start: 2022-10-26 | End: 2022-10-26 | Stop reason: HOSPADM

## 2022-10-26 RX ORDER — LIDOCAINE HYDROCHLORIDE 40 MG/ML
SOLUTION TOPICAL AS NEEDED
Status: DISCONTINUED | OUTPATIENT
Start: 2022-10-26 | End: 2022-10-26 | Stop reason: SURG

## 2022-10-26 RX ORDER — OXYCODONE HYDROCHLORIDE 5 MG/1
5 TABLET ORAL ONCE AS NEEDED
Status: COMPLETED | OUTPATIENT
Start: 2022-10-26 | End: 2022-10-26

## 2022-10-26 RX ORDER — SODIUM CHLORIDE 9 MG/ML
INJECTION, SOLUTION INTRAVENOUS AS NEEDED
Status: DISCONTINUED | OUTPATIENT
Start: 2022-10-26 | End: 2022-10-26 | Stop reason: HOSPADM

## 2022-10-26 RX ORDER — TRAMADOL HYDROCHLORIDE 50 MG/1
25 TABLET ORAL EVERY 8 HOURS PRN
Status: DISCONTINUED | OUTPATIENT
Start: 2022-10-26 | End: 2022-11-07 | Stop reason: HOSPADM

## 2022-10-26 RX ORDER — VASOPRESSIN 20 U/ML
INJECTION PARENTERAL AS NEEDED
Status: DISCONTINUED | OUTPATIENT
Start: 2022-10-26 | End: 2022-10-26 | Stop reason: SURG

## 2022-10-26 RX ORDER — EPHEDRINE SULFATE 50 MG/ML
INJECTION INTRAVENOUS AS NEEDED
Status: DISCONTINUED | OUTPATIENT
Start: 2022-10-26 | End: 2022-10-26 | Stop reason: SURG

## 2022-10-26 RX ORDER — ESTRADIOL 2 MG/1
2 TABLET ORAL DAILY
Status: DISCONTINUED | OUTPATIENT
Start: 2022-10-26 | End: 2022-11-07 | Stop reason: HOSPADM

## 2022-10-26 RX ORDER — BUDESONIDE AND FORMOTEROL FUMARATE DIHYDRATE 160; 4.5 UG/1; UG/1
2 AEROSOL RESPIRATORY (INHALATION)
Status: DISCONTINUED | OUTPATIENT
Start: 2022-10-26 | End: 2022-11-07 | Stop reason: HOSPADM

## 2022-10-26 RX ORDER — HYDROMORPHONE HYDROCHLORIDE 1 MG/ML
0.2 INJECTION, SOLUTION INTRAMUSCULAR; INTRAVENOUS; SUBCUTANEOUS EVERY 4 HOURS PRN
Status: DISCONTINUED | OUTPATIENT
Start: 2022-10-26 | End: 2022-10-31

## 2022-10-26 RX ORDER — METOCLOPRAMIDE HYDROCHLORIDE 5 MG/ML
10 INJECTION INTRAMUSCULAR; INTRAVENOUS ONCE AS NEEDED
Status: DISCONTINUED | OUTPATIENT
Start: 2022-10-26 | End: 2022-10-26

## 2022-10-26 RX ORDER — KETAMINE HCL IN NACL, ISO-OSM 100MG/10ML
SYRINGE (ML) INJECTION AS NEEDED
Status: DISCONTINUED | OUTPATIENT
Start: 2022-10-26 | End: 2022-10-26 | Stop reason: SURG

## 2022-10-26 RX ORDER — KETAMINE HCL IN NACL, ISO-OSM 100MG/10ML
10 SYRINGE (ML) INJECTION
Status: DISCONTINUED | OUTPATIENT
Start: 2022-10-26 | End: 2022-10-26

## 2022-10-26 RX ORDER — MAGNESIUM SULFATE HEPTAHYDRATE 500 MG/ML
INJECTION, SOLUTION INTRAMUSCULAR; INTRAVENOUS AS NEEDED
Status: DISCONTINUED | OUTPATIENT
Start: 2022-10-26 | End: 2022-10-26 | Stop reason: SURG

## 2022-10-26 RX ADMIN — GABAPENTIN 100 MG: 100 CAPSULE ORAL at 21:15

## 2022-10-26 RX ADMIN — ONDANSETRON 4 MG: 2 INJECTION INTRAMUSCULAR; INTRAVENOUS at 09:45

## 2022-10-26 RX ADMIN — MAGNESIUM SULFATE HEPTAHYDRATE 1 G: 500 INJECTION, SOLUTION INTRAMUSCULAR; INTRAVENOUS at 08:30

## 2022-10-26 RX ADMIN — FAMOTIDINE 20 MG: 10 INJECTION INTRAVENOUS at 06:53

## 2022-10-26 RX ADMIN — VASOPRESSIN 2 UNITS: 20 INJECTION INTRAVENOUS at 08:43

## 2022-10-26 RX ADMIN — HYDROMORPHONE HYDROCHLORIDE 0.25 MG: 1 INJECTION, SOLUTION INTRAMUSCULAR; INTRAVENOUS; SUBCUTANEOUS at 12:02

## 2022-10-26 RX ADMIN — ESTRADIOL 2 MG: 2 TABLET ORAL at 16:58

## 2022-10-26 RX ADMIN — ROCURONIUM BROMIDE 50 MG: 50 INJECTION INTRAVENOUS at 07:53

## 2022-10-26 RX ADMIN — CARVEDILOL 25 MG: 25 TABLET, FILM COATED ORAL at 17:00

## 2022-10-26 RX ADMIN — FLUTICASONE PROPIONATE 2 SPRAY: 50 SPRAY, METERED NASAL at 16:58

## 2022-10-26 RX ADMIN — ACETAMINOPHEN 1000 MG: 500 TABLET ORAL at 14:37

## 2022-10-26 RX ADMIN — CEFOXITIN SODIUM 2 G: 2 POWDER, FOR SOLUTION INTRAVENOUS at 07:35

## 2022-10-26 RX ADMIN — CETIRIZINE HYDROCHLORIDE 10 MG: 10 TABLET ORAL at 16:58

## 2022-10-26 RX ADMIN — HYDROMORPHONE HYDROCHLORIDE 0.25 MG: 1 INJECTION, SOLUTION INTRAMUSCULAR; INTRAVENOUS; SUBCUTANEOUS at 11:27

## 2022-10-26 RX ADMIN — CEFOXITIN SODIUM 2 G: 2 POWDER, FOR SOLUTION INTRAVENOUS at 09:35

## 2022-10-26 RX ADMIN — HYDRALAZINE HYDROCHLORIDE 5 MG: 20 INJECTION INTRAMUSCULAR; INTRAVENOUS at 13:26

## 2022-10-26 RX ADMIN — ACETAMINOPHEN 1000 MG: 500 TABLET ORAL at 21:18

## 2022-10-26 RX ADMIN — ALPRAZOLAM 0.5 MG: 0.5 TABLET ORAL at 21:15

## 2022-10-26 RX ADMIN — ONDANSETRON 4 MG: 2 INJECTION INTRAMUSCULAR; INTRAVENOUS at 12:52

## 2022-10-26 RX ADMIN — Medication 10 MG: at 12:44

## 2022-10-26 RX ADMIN — HYDRALAZINE HYDROCHLORIDE 5 MG: 20 INJECTION INTRAMUSCULAR; INTRAVENOUS at 13:16

## 2022-10-26 RX ADMIN — Medication 20 MG: at 08:15

## 2022-10-26 RX ADMIN — HYDROMORPHONE HYDROCHLORIDE 0.25 MG: 1 INJECTION, SOLUTION INTRAMUSCULAR; INTRAVENOUS; SUBCUTANEOUS at 12:35

## 2022-10-26 RX ADMIN — EPHEDRINE SULFATE 10 MG: 50 INJECTION INTRAVENOUS at 08:01

## 2022-10-26 RX ADMIN — SODIUM CHLORIDE 30 ML/HR: 9 INJECTION, SOLUTION INTRAVENOUS at 12:12

## 2022-10-26 RX ADMIN — SODIUM CHLORIDE, POTASSIUM CHLORIDE, SODIUM LACTATE AND CALCIUM CHLORIDE 9 ML/HR: 600; 310; 30; 20 INJECTION, SOLUTION INTRAVENOUS at 06:53

## 2022-10-26 RX ADMIN — MAGNESIUM SULFATE HEPTAHYDRATE 1 G: 500 INJECTION, SOLUTION INTRAMUSCULAR; INTRAVENOUS at 08:15

## 2022-10-26 RX ADMIN — FENTANYL CITRATE 25 MCG: 50 INJECTION, SOLUTION INTRAMUSCULAR; INTRAVENOUS at 09:20

## 2022-10-26 RX ADMIN — DEXAMETHASONE SODIUM PHOSPHATE 4 MG: 4 INJECTION, SOLUTION INTRAMUSCULAR; INTRAVENOUS at 08:20

## 2022-10-26 RX ADMIN — SODIUM CHLORIDE, POTASSIUM CHLORIDE, SODIUM LACTATE AND CALCIUM CHLORIDE: 600; 310; 30; 20 INJECTION, SOLUTION INTRAVENOUS at 07:10

## 2022-10-26 RX ADMIN — BUPIVACAINE HYDROCHLORIDE 20 ML: 2.5 INJECTION, SOLUTION EPIDURAL; INFILTRATION; INTRACAUDAL; PERINEURAL at 06:55

## 2022-10-26 RX ADMIN — VALSARTAN 160 MG: 160 TABLET, FILM COATED ORAL at 21:15

## 2022-10-26 RX ADMIN — ATORVASTATIN CALCIUM 40 MG: 20 TABLET, FILM COATED ORAL at 14:37

## 2022-10-26 RX ADMIN — LIDOCAINE HYDROCHLORIDE 1 EACH: 40 SOLUTION TOPICAL at 07:54

## 2022-10-26 RX ADMIN — PROPOFOL 120 MG: 10 INJECTION, EMULSION INTRAVENOUS at 07:53

## 2022-10-26 RX ADMIN — TRAMADOL HYDROCHLORIDE 25 MG: 50 TABLET ORAL at 14:37

## 2022-10-26 RX ADMIN — PROPOFOL 40 MG: 10 INJECTION, EMULSION INTRAVENOUS at 08:37

## 2022-10-26 RX ADMIN — SODIUM CHLORIDE 30 ML/HR: 9 INJECTION, SOLUTION INTRAVENOUS at 16:58

## 2022-10-26 RX ADMIN — VASOPRESSIN 2 UNITS: 20 INJECTION INTRAVENOUS at 09:14

## 2022-10-26 RX ADMIN — FENTANYL CITRATE 25 MCG: 50 INJECTION, SOLUTION INTRAMUSCULAR; INTRAVENOUS at 08:37

## 2022-10-26 RX ADMIN — BUPIVACAINE 10 ML: 13.3 INJECTION, SUSPENSION, LIPOSOMAL INFILTRATION at 06:55

## 2022-10-26 RX ADMIN — HYDROMORPHONE HYDROCHLORIDE 0.25 MG: 1 INJECTION, SOLUTION INTRAMUSCULAR; INTRAVENOUS; SUBCUTANEOUS at 13:18

## 2022-10-26 RX ADMIN — OXYCODONE 5 MG: 5 TABLET ORAL at 12:12

## 2022-10-26 RX ADMIN — EPHEDRINE SULFATE 10 MG: 50 INJECTION INTRAVENOUS at 08:43

## 2022-10-26 RX ADMIN — GABAPENTIN 100 MG: 100 CAPSULE ORAL at 14:37

## 2022-10-26 RX ADMIN — FENTANYL CITRATE 50 MCG: 50 INJECTION, SOLUTION INTRAMUSCULAR; INTRAVENOUS at 07:53

## 2022-10-26 RX ADMIN — SUGAMMADEX 200 MG: 100 INJECTION, SOLUTION INTRAVENOUS at 09:56

## 2022-10-26 RX ADMIN — LIDOCAINE HYDROCHLORIDE 60 MG: 20 INJECTION, SOLUTION INFILTRATION; PERINEURAL at 07:53

## 2022-10-26 RX ADMIN — Medication 10 MG: at 09:15

## 2022-10-26 NOTE — ANESTHESIA PROCEDURE NOTES
Arterial Line    Pre-sedation assessment completed: 10/26/2022 7:15 AM    Patient reassessed immediately prior to procedure    Patient location during procedure: holding area  Start time: 10/26/2022 7:15 AM  Stop Time:10/26/2022 7:20 AM       Line placed for hemodynamic monitoring, ABGs/Labs/ISTAT and MD/Surgeon request.  Performed By   Anesthesiologist: Yossi Villalobos MD   Preanesthetic Checklist  Completed: patient identified, IV checked, risks and benefits discussed, surgical consent, monitors and equipment checked, pre-op evaluation and timeout performed  Arterial Line Prep    Sterile Tech: cap, gloves, gown, mask and sterile barriers  Prep: ChloraPrep  Patient monitoring: blood pressure monitoring, continuous pulse oximetry and EKG  Arterial Line Procedure   Laterality:left  Location:  radial artery  Catheter size: 20 G   Guidance: ultrasound guided  PROCEDURE NOTE/ULTRASOUND INTERPRETATION.  Using ultrasound guidance the potential vascular sites for insertion of the catheter were visualized to determine the patency of the vessel to be used for vascular access.  After selecting the appropriate site for insertion, the needle was visualized under ultrasound being inserted into the radial artery, followed by ultrasound confirmation of wire and catheter placement. There were no abnormalities seen on ultrasound; an image was taken; and the patient tolerated the procedure with no complications.   Number of attempts: 1  Successful placement: yes   Post Assessment   Dressing Type: occlusive dressing applied, secured with tape and wrist guard applied.   Complications no  Circ/Move/Sens Assessment: normal.   Patient Tolerance: patient tolerated the procedure well with no apparent complications  Additional Notes  Using ultrasound guidance, the potential vascular sites for insertion of the catheter were visualized to determine the patency of the vessel to be used for vascular access.  After selecting the  appropriate site for insertion, the needle was visualized under ultrasound being inserted into the artery, followed by ultrasound confirmation of wire and catheter placement.  There were no abnormalities seen on ultrasound; an image was taken and the patient tolerated the procedure with no apparent complications.

## 2022-10-26 NOTE — ANESTHESIA PROCEDURE NOTES
Airway  Urgency: elective    Airway not difficult    General Information and Staff    Anesthesiologist: Yossi Villalobos MD  CRNA/CAA: Mitch Le CRNA    Indications and Patient Condition  Indications for airway management: airway protection    Preoxygenated: yes  MILS maintained throughout  Mask difficulty assessment: 1 - vent by mask    Final Airway Details  Final airway type: endotracheal airway      Successful airway: EBT - double lumen left  Cuffed: yes   Successful intubation technique: direct laryngoscopy  Endotracheal tube insertion site: oral  Blade: [place]  Blade size: 3  EBT DL size (fr): 35  Placement verified by: chest auscultation, bronchoscopy and capnometry   Measured from: teeth  ETT/EBT  to teeth (cm): 25  Number of attempts at approach: 1  Assessment: lips, teeth, and gum same as pre-op and atraumatic intubation    Additional Comments  FOB position verification of JULIA

## 2022-10-26 NOTE — ANESTHESIA PREPROCEDURE EVALUATION
Anesthesia Evaluation     Patient summary reviewed and Nursing notes reviewed   history of anesthetic complications: PONV               Airway   Mallampati: II  TM distance: >3 FB  Neck ROM: full  Small opening  Dental      Pulmonary    (+) a smoker Former, COPD,   Cardiovascular     ECG reviewed  PT is on anticoagulation therapy  Patient on routine beta blocker and Beta blocker given within 24 hours of surgery  Rhythm: regular  Rate: normal    (+) hypertension,       Neuro/Psych  (+) CVA, dizziness/light headedness, psychiatric history Anxiety,    GI/Hepatic/Renal/Endo - negative ROS     Musculoskeletal (-) negative ROS    Abdominal    Substance History - negative use     OB/GYN negative ob/gyn ROS         Other      history of cancer active                    Anesthesia Plan    ASA 3     [place] and general with block     (I have reviewed the patient's history with the patient and the chart, including all pertinent laboratory results and imaging. I have explained the risks of anesthesia including but not limited to dental damage, corneal abrasion, nerve injury, MI, stroke, and death. Questions asked and answered. Anesthetic plan discussed with patient and team as indicated. Patient expressed understanding of the above.    Right ROSALIO with exparel)  intravenous induction     Anesthetic plan, risks, benefits, and alternatives have been provided, discussed and informed consent has been obtained with: patient and spouse/significant other.        CODE STATUS:

## 2022-10-26 NOTE — ANESTHESIA PROCEDURE NOTES
Peripheral Block    Pre-sedation assessment completed: 10/26/2022 6:55 AM    Patient location during procedure: holding area  Start time: 10/26/2022 6:55 AM  Stop time: 10/26/2022 7:05 AM  Reason for block: at surgeon's request and post-op pain management  Performed by  Anesthesiologist: Yossi Villalobos MD  Preanesthetic Checklist  Completed: patient identified, IV checked, site marked, risks and benefits discussed, surgical consent, monitors and equipment checked, pre-op evaluation and timeout performed  Prep:  Pt Position: sitting  Sterile barriers:cap, gloves, gown, mask and sterile barriers  Prep: ChloraPrep  Patient monitoring: blood pressure monitoring, continuous pulse oximetry and EKG  Procedure    Sedation: yes  Performed under: local infiltration  Guidance:ultrasound guided    ULTRASOUND INTERPRETATION.  Using ultrasound guidance a 21 G gauge needle was placed in close proximity to the nerve, at which point, under ultrasound guidance anesthetic was injected in the area of the nerve and spread of the anesthesia was seen on ultrasound in close proximity thereto.  There were no abnormalities seen on ultrasound; a digital image was taken; and the patient tolerated the procedure with no complications. Images:still images obtained    Laterality:right  Block Type:erector spinae block  Injection Technique:single-shot  Needle Type:echogenic  Needle Gauge:21 G      Medications Used: bupivacaine liposome (EXPAREL) 1.3 % injection - Infiltration   10 mL - 10/26/2022 6:55:00 AM  bupivacaine PF (MARCAINE) 0.25 % injection - Injection   20 mL - 10/26/2022 6:55:00 AM      Medications  Comment:Ultrasound Interpretation: Using ultrasound guidance, the needle was placed in close proximity to the target nerve and anesthetic was injected in the area of the target nerve and/or bundles, and spread of the anesthetic was seen on ultrasound in close proximity thereto.  There were no abnormalities seen on ultrasound; a  digital / physical image was taken; and the patient tolerated the procedure with no complications.   Block placed for postoperative pain control per surgeon request.    Post Assessment  Injection Assessment: negative aspiration for heme, no paresthesia on injection and incremental injection  Patient Tolerance:comfortable throughout block  Complications:no

## 2022-10-26 NOTE — ANESTHESIA POSTPROCEDURE EVALUATION
Patient: Aliya Villaseñor    Procedure Summary     Date: 10/26/22 Room / Location: [facility] OR  / [facility] MAIN OR    Anesthesia Start: 0742 Anesthesia Stop: 1028    Procedure: THORACOSCOPY WITH DAVINCI ROBOT, RIGHT UPPER LOBE WEDGE RESECTION, MEDIASTINAL LYMPH NODE DISSECTION, FLEXIBLE BRONCHOSCOPY, INTERCOSTAL NERVE BLOCK (Right: Chest) Diagnosis:       Lung nodule      (Lung nodule [R91.1])    Surgeons: Matt Wood MD Provider: Yossi Villalobos MD    Anesthesia Type: [place], general with block ASA Status: 3          Anesthesia Type: [place], general with block    Vitals  Vitals Value Taken Time   /92 10/26/22 1131   Temp     Pulse 60 10/26/22 1144   Resp 14 10/26/22 1130   SpO2 95 % 10/26/22 1144   Vitals shown include unvalidated device data.        Post Anesthesia Care and Evaluation    Patient location during evaluation: PACU  Patient participation: complete - patient participated  Level of consciousness: awake and alert  Pain management: adequate    Airway patency: patent  Anesthetic complications: No anesthetic complications    Cardiovascular status: acceptable  Respiratory status: acceptable  Hydration status: acceptable    Comments: --------------------            10/26/22               1130     --------------------   BP:       157/92     Pulse:      59       Resp:       14       Temp:                SpO2:      97%      --------------------

## 2022-10-26 NOTE — ADDENDUM NOTE
Addendum  created 10/26/22 1313 by Yossi Villalobos MD    Order list changed, Order sets accessed

## 2022-10-27 ENCOUNTER — APPOINTMENT (OUTPATIENT)
Dept: GENERAL RADIOLOGY | Facility: HOSPITAL | Age: 77
End: 2022-10-27

## 2022-10-27 LAB
LAB AP CASE REPORT: NORMAL
Lab: NORMAL
PATH REPORT.FINAL DX SPEC: NORMAL
PATH REPORT.GROSS SPEC: NORMAL

## 2022-10-27 PROCEDURE — 94761 N-INVAS EAR/PLS OXIMETRY MLT: CPT

## 2022-10-27 PROCEDURE — 82664 ELECTROPHORETIC TEST: CPT | Performed by: NURSE PRACTITIONER

## 2022-10-27 PROCEDURE — 25010000002 OCTREOTIDE PER 25 MCG: Performed by: NURSE PRACTITIONER

## 2022-10-27 PROCEDURE — 25010000002 ENOXAPARIN PER 10 MG: Performed by: SURGERY

## 2022-10-27 PROCEDURE — 71045 X-RAY EXAM CHEST 1 VIEW: CPT

## 2022-10-27 PROCEDURE — 94799 UNLISTED PULMONARY SVC/PX: CPT

## 2022-10-27 PROCEDURE — 94664 DEMO&/EVAL PT USE INHALER: CPT

## 2022-10-27 PROCEDURE — 84478 ASSAY OF TRIGLYCERIDES: CPT | Performed by: NURSE PRACTITIONER

## 2022-10-27 PROCEDURE — 99024 POSTOP FOLLOW-UP VISIT: CPT | Performed by: NURSE PRACTITIONER

## 2022-10-27 PROCEDURE — 94640 AIRWAY INHALATION TREATMENT: CPT

## 2022-10-27 PROCEDURE — 25010000002 FUROSEMIDE PER 20 MG: Performed by: NURSE PRACTITIONER

## 2022-10-27 RX ORDER — FUROSEMIDE 10 MG/ML
20 INJECTION INTRAMUSCULAR; INTRAVENOUS ONCE
Status: COMPLETED | OUTPATIENT
Start: 2022-10-27 | End: 2022-10-27

## 2022-10-27 RX ORDER — OCTREOTIDE ACETATE 500 UG/ML
100 INJECTION, SOLUTION INTRAVENOUS; SUBCUTANEOUS EVERY 8 HOURS
Status: DISCONTINUED | OUTPATIENT
Start: 2022-10-27 | End: 2022-11-01

## 2022-10-27 RX ADMIN — CETIRIZINE HYDROCHLORIDE 10 MG: 10 TABLET ORAL at 08:47

## 2022-10-27 RX ADMIN — FUROSEMIDE 20 MG: 10 INJECTION, SOLUTION INTRAMUSCULAR; INTRAVENOUS at 10:01

## 2022-10-27 RX ADMIN — ACETAMINOPHEN 1000 MG: 500 TABLET ORAL at 14:22

## 2022-10-27 RX ADMIN — OXYCODONE HYDROCHLORIDE 5 MG: 5 TABLET ORAL at 08:47

## 2022-10-27 RX ADMIN — ALPRAZOLAM 0.5 MG: 0.5 TABLET ORAL at 22:53

## 2022-10-27 RX ADMIN — GABAPENTIN 100 MG: 100 CAPSULE ORAL at 21:25

## 2022-10-27 RX ADMIN — ATORVASTATIN CALCIUM 40 MG: 20 TABLET, FILM COATED ORAL at 08:47

## 2022-10-27 RX ADMIN — ENOXAPARIN SODIUM 40 MG: 100 INJECTION SUBCUTANEOUS at 21:25

## 2022-10-27 RX ADMIN — OXYCODONE HYDROCHLORIDE 5 MG: 5 TABLET ORAL at 17:14

## 2022-10-27 RX ADMIN — CARVEDILOL 25 MG: 25 TABLET, FILM COATED ORAL at 08:47

## 2022-10-27 RX ADMIN — FLUTICASONE PROPIONATE 2 SPRAY: 50 SPRAY, METERED NASAL at 08:47

## 2022-10-27 RX ADMIN — Medication 30 ML: at 17:11

## 2022-10-27 RX ADMIN — BUDESONIDE AND FORMOTEROL FUMARATE DIHYDRATE 2 PUFF: 160; 4.5 AEROSOL RESPIRATORY (INHALATION) at 19:08

## 2022-10-27 RX ADMIN — GABAPENTIN 100 MG: 100 CAPSULE ORAL at 06:16

## 2022-10-27 RX ADMIN — ACETAMINOPHEN 1000 MG: 500 TABLET ORAL at 06:16

## 2022-10-27 RX ADMIN — CARVEDILOL 25 MG: 25 TABLET, FILM COATED ORAL at 17:11

## 2022-10-27 RX ADMIN — BUDESONIDE AND FORMOTEROL FUMARATE DIHYDRATE 2 PUFF: 160; 4.5 AEROSOL RESPIRATORY (INHALATION) at 07:48

## 2022-10-27 RX ADMIN — VALSARTAN 160 MG: 160 TABLET, FILM COATED ORAL at 08:47

## 2022-10-27 RX ADMIN — ACETAMINOPHEN 1000 MG: 500 TABLET ORAL at 21:25

## 2022-10-27 RX ADMIN — TIOTROPIUM BROMIDE INHALATION SPRAY 1 PUFF: 3.12 SPRAY, METERED RESPIRATORY (INHALATION) at 07:45

## 2022-10-27 RX ADMIN — OCTREOTIDE ACETATE 100 MCG: 500 INJECTION, SOLUTION INTRAVENOUS; SUBCUTANEOUS at 21:24

## 2022-10-27 RX ADMIN — ESTRADIOL 2 MG: 2 TABLET ORAL at 08:47

## 2022-10-27 RX ADMIN — OCTREOTIDE ACETATE 100 MCG: 500 INJECTION, SOLUTION INTRAVENOUS; SUBCUTANEOUS at 14:22

## 2022-10-27 RX ADMIN — GABAPENTIN 100 MG: 100 CAPSULE ORAL at 14:22

## 2022-10-27 RX ADMIN — VALSARTAN 160 MG: 160 TABLET, FILM COATED ORAL at 21:24

## 2022-10-27 RX ADMIN — OXYCODONE HYDROCHLORIDE 5 MG: 5 TABLET ORAL at 21:24

## 2022-10-28 ENCOUNTER — APPOINTMENT (OUTPATIENT)
Dept: GENERAL RADIOLOGY | Facility: HOSPITAL | Age: 77
End: 2022-10-28

## 2022-10-28 PROBLEM — E44.0 MODERATE MALNUTRITION: Status: ACTIVE | Noted: 2022-10-28

## 2022-10-28 LAB — TRIGL FLD-MCNC: 3420 MG/DL

## 2022-10-28 PROCEDURE — 25010000002 OCTREOTIDE PER 25 MCG: Performed by: NURSE PRACTITIONER

## 2022-10-28 PROCEDURE — 94760 N-INVAS EAR/PLS OXIMETRY 1: CPT

## 2022-10-28 PROCEDURE — 94799 UNLISTED PULMONARY SVC/PX: CPT

## 2022-10-28 PROCEDURE — 94664 DEMO&/EVAL PT USE INHALER: CPT

## 2022-10-28 PROCEDURE — 25010000002 ENOXAPARIN PER 10 MG: Performed by: SURGERY

## 2022-10-28 PROCEDURE — 71045 X-RAY EXAM CHEST 1 VIEW: CPT

## 2022-10-28 PROCEDURE — 25010000002 ONDANSETRON PER 1 MG: Performed by: NURSE PRACTITIONER

## 2022-10-28 PROCEDURE — 99024 POSTOP FOLLOW-UP VISIT: CPT | Performed by: NURSE PRACTITIONER

## 2022-10-28 PROCEDURE — 94761 N-INVAS EAR/PLS OXIMETRY MLT: CPT

## 2022-10-28 RX ORDER — ONDANSETRON 4 MG/1
4 TABLET, FILM COATED ORAL EVERY 6 HOURS PRN
Status: DISCONTINUED | OUTPATIENT
Start: 2022-10-28 | End: 2022-11-07 | Stop reason: HOSPADM

## 2022-10-28 RX ORDER — SCOLOPAMINE TRANSDERMAL SYSTEM 1 MG/1
1 PATCH, EXTENDED RELEASE TRANSDERMAL
Status: DISCONTINUED | OUTPATIENT
Start: 2022-10-28 | End: 2022-11-07 | Stop reason: HOSPADM

## 2022-10-28 RX ORDER — ONDANSETRON 2 MG/ML
4 INJECTION INTRAMUSCULAR; INTRAVENOUS EVERY 6 HOURS PRN
Status: DISCONTINUED | OUTPATIENT
Start: 2022-10-28 | End: 2022-11-07 | Stop reason: HOSPADM

## 2022-10-28 RX ADMIN — OXYCODONE HYDROCHLORIDE 5 MG: 5 TABLET ORAL at 05:33

## 2022-10-28 RX ADMIN — OCTREOTIDE ACETATE 100 MCG: 500 INJECTION, SOLUTION INTRAVENOUS; SUBCUTANEOUS at 14:30

## 2022-10-28 RX ADMIN — ACETAMINOPHEN 1000 MG: 500 TABLET ORAL at 23:01

## 2022-10-28 RX ADMIN — SCOPALAMINE 1 PATCH: 1 PATCH, EXTENDED RELEASE TRANSDERMAL at 20:16

## 2022-10-28 RX ADMIN — Medication 30 ML: at 11:53

## 2022-10-28 RX ADMIN — ACETAMINOPHEN 1000 MG: 500 TABLET ORAL at 05:33

## 2022-10-28 RX ADMIN — OCTREOTIDE ACETATE 100 MCG: 500 INJECTION, SOLUTION INTRAVENOUS; SUBCUTANEOUS at 23:02

## 2022-10-28 RX ADMIN — Medication 30 ML: at 08:14

## 2022-10-28 RX ADMIN — TIOTROPIUM BROMIDE INHALATION SPRAY 1 PUFF: 3.12 SPRAY, METERED RESPIRATORY (INHALATION) at 08:02

## 2022-10-28 RX ADMIN — ACETAMINOPHEN 1000 MG: 500 TABLET ORAL at 14:30

## 2022-10-28 RX ADMIN — ONDANSETRON 4 MG: 2 INJECTION INTRAMUSCULAR; INTRAVENOUS at 14:30

## 2022-10-28 RX ADMIN — BUDESONIDE AND FORMOTEROL FUMARATE DIHYDRATE 2 PUFF: 160; 4.5 AEROSOL RESPIRATORY (INHALATION) at 08:01

## 2022-10-28 RX ADMIN — CARVEDILOL 25 MG: 25 TABLET, FILM COATED ORAL at 08:14

## 2022-10-28 RX ADMIN — GABAPENTIN 100 MG: 100 CAPSULE ORAL at 05:33

## 2022-10-28 RX ADMIN — CETIRIZINE HYDROCHLORIDE 10 MG: 10 TABLET ORAL at 08:14

## 2022-10-28 RX ADMIN — GABAPENTIN 100 MG: 100 CAPSULE ORAL at 14:30

## 2022-10-28 RX ADMIN — DIAZEPAM 2 MG: 2 TABLET ORAL at 02:22

## 2022-10-28 RX ADMIN — ESTRADIOL 2 MG: 2 TABLET ORAL at 08:14

## 2022-10-28 RX ADMIN — GABAPENTIN 100 MG: 100 CAPSULE ORAL at 23:01

## 2022-10-28 RX ADMIN — TRAMADOL HYDROCHLORIDE 25 MG: 50 TABLET, COATED ORAL at 08:22

## 2022-10-28 RX ADMIN — BUDESONIDE AND FORMOTEROL FUMARATE DIHYDRATE 2 PUFF: 160; 4.5 AEROSOL RESPIRATORY (INHALATION) at 19:10

## 2022-10-28 RX ADMIN — VALSARTAN 160 MG: 160 TABLET, FILM COATED ORAL at 08:14

## 2022-10-28 RX ADMIN — ATORVASTATIN CALCIUM 40 MG: 20 TABLET, FILM COATED ORAL at 08:14

## 2022-10-28 RX ADMIN — ENOXAPARIN SODIUM 40 MG: 100 INJECTION SUBCUTANEOUS at 20:16

## 2022-10-28 RX ADMIN — OCTREOTIDE ACETATE 100 MCG: 500 INJECTION, SOLUTION INTRAVENOUS; SUBCUTANEOUS at 05:33

## 2022-10-28 RX ADMIN — FLUTICASONE PROPIONATE 2 SPRAY: 50 SPRAY, METERED NASAL at 08:31

## 2022-10-29 ENCOUNTER — APPOINTMENT (OUTPATIENT)
Dept: GENERAL RADIOLOGY | Facility: HOSPITAL | Age: 77
End: 2022-10-29

## 2022-10-29 LAB
ALBUMIN SERPL-MCNC: 3 G/DL (ref 3.5–5.2)
ALBUMIN/GLOB SERPL: 1.1 G/DL
ALP SERPL-CCNC: 139 U/L (ref 39–117)
ALT SERPL W P-5'-P-CCNC: 55 U/L (ref 1–33)
ANION GAP SERPL CALCULATED.3IONS-SCNC: 12.1 MMOL/L (ref 5–15)
AST SERPL-CCNC: 39 U/L (ref 1–32)
BACTERIA SPEC AEROBE CULT: NORMAL
BILIRUB SERPL-MCNC: 0.4 MG/DL (ref 0–1.2)
BUN SERPL-MCNC: 49 MG/DL (ref 8–23)
BUN/CREAT SERPL: 31.4 (ref 7–25)
CALCIUM SPEC-SCNC: 8.1 MG/DL (ref 8.6–10.5)
CHLORIDE SERPL-SCNC: 101 MMOL/L (ref 98–107)
CO2 SERPL-SCNC: 22.9 MMOL/L (ref 22–29)
CREAT SERPL-MCNC: 1.56 MG/DL (ref 0.57–1)
DEPRECATED RDW RBC AUTO: 45.3 FL (ref 37–54)
EGFRCR SERPLBLD CKD-EPI 2021: 34.3 ML/MIN/1.73
ERYTHROCYTE [DISTWIDTH] IN BLOOD BY AUTOMATED COUNT: 13.9 % (ref 12.3–15.4)
GLOBULIN UR ELPH-MCNC: 2.7 GM/DL
GLUCOSE SERPL-MCNC: 127 MG/DL (ref 65–99)
GRAM STN SPEC: NORMAL
HCT VFR BLD AUTO: 33.8 % (ref 34–46.6)
HGB BLD-MCNC: 11.4 G/DL (ref 12–15.9)
MCH RBC QN AUTO: 30.3 PG (ref 26.6–33)
MCHC RBC AUTO-ENTMCNC: 33.7 G/DL (ref 31.5–35.7)
MCV RBC AUTO: 89.9 FL (ref 79–97)
PLATELET # BLD AUTO: 272 10*3/MM3 (ref 140–450)
PMV BLD AUTO: 10.4 FL (ref 6–12)
POTASSIUM SERPL-SCNC: 3.8 MMOL/L (ref 3.5–5.2)
PROT SERPL-MCNC: 5.7 G/DL (ref 6–8.5)
RBC # BLD AUTO: 3.76 10*6/MM3 (ref 3.77–5.28)
SODIUM SERPL-SCNC: 136 MMOL/L (ref 136–145)
WBC NRBC COR # BLD: 10.07 10*3/MM3 (ref 3.4–10.8)

## 2022-10-29 PROCEDURE — 94799 UNLISTED PULMONARY SVC/PX: CPT

## 2022-10-29 PROCEDURE — 80053 COMPREHEN METABOLIC PANEL: CPT | Performed by: SURGERY

## 2022-10-29 PROCEDURE — 25010000002 OCTREOTIDE PER 25 MCG: Performed by: NURSE PRACTITIONER

## 2022-10-29 PROCEDURE — 71045 X-RAY EXAM CHEST 1 VIEW: CPT

## 2022-10-29 PROCEDURE — 25010000002 ALBUMIN HUMAN 25% PER 50 ML: Performed by: THORACIC SURGERY (CARDIOTHORACIC VASCULAR SURGERY)

## 2022-10-29 PROCEDURE — 94761 N-INVAS EAR/PLS OXIMETRY MLT: CPT

## 2022-10-29 PROCEDURE — P9047 ALBUMIN (HUMAN), 25%, 50ML: HCPCS | Performed by: THORACIC SURGERY (CARDIOTHORACIC VASCULAR SURGERY)

## 2022-10-29 PROCEDURE — 99232 SBSQ HOSP IP/OBS MODERATE 35: CPT | Performed by: NURSE PRACTITIONER

## 2022-10-29 PROCEDURE — 94664 DEMO&/EVAL PT USE INHALER: CPT

## 2022-10-29 PROCEDURE — 25010000002 ENOXAPARIN PER 10 MG: Performed by: SURGERY

## 2022-10-29 PROCEDURE — 25010000002 ONDANSETRON PER 1 MG: Performed by: NURSE PRACTITIONER

## 2022-10-29 PROCEDURE — 85027 COMPLETE CBC AUTOMATED: CPT | Performed by: SURGERY

## 2022-10-29 RX ORDER — SODIUM CHLORIDE 9 MG/ML
400 INJECTION, SOLUTION INTRAVENOUS CONTINUOUS
Status: DISCONTINUED | OUTPATIENT
Start: 2022-10-29 | End: 2022-10-31

## 2022-10-29 RX ORDER — ALBUMIN, HUMAN INJ 5% 5 %
500 SOLUTION INTRAVENOUS ONCE
Status: DISCONTINUED | OUTPATIENT
Start: 2022-10-29 | End: 2022-10-29 | Stop reason: SDUPTHER

## 2022-10-29 RX ORDER — ALBUMIN (HUMAN) 12.5 G/50ML
25 SOLUTION INTRAVENOUS ONCE
Status: COMPLETED | OUTPATIENT
Start: 2022-10-29 | End: 2022-10-29

## 2022-10-29 RX ADMIN — GABAPENTIN 100 MG: 100 CAPSULE ORAL at 05:53

## 2022-10-29 RX ADMIN — TIOTROPIUM BROMIDE INHALATION SPRAY 1 PUFF: 3.12 SPRAY, METERED RESPIRATORY (INHALATION) at 07:43

## 2022-10-29 RX ADMIN — ONDANSETRON 4 MG: 2 INJECTION INTRAMUSCULAR; INTRAVENOUS at 18:16

## 2022-10-29 RX ADMIN — Medication 30 ML: at 17:11

## 2022-10-29 RX ADMIN — ALBUMIN HUMAN 25 G: 0.25 SOLUTION INTRAVENOUS at 04:50

## 2022-10-29 RX ADMIN — SODIUM CHLORIDE 50 ML/HR: 9 INJECTION, SOLUTION INTRAVENOUS at 23:14

## 2022-10-29 RX ADMIN — VALSARTAN 160 MG: 160 TABLET, FILM COATED ORAL at 08:43

## 2022-10-29 RX ADMIN — ACETAMINOPHEN 1000 MG: 500 TABLET ORAL at 21:58

## 2022-10-29 RX ADMIN — ESTRADIOL 2 MG: 2 TABLET ORAL at 08:43

## 2022-10-29 RX ADMIN — CETIRIZINE HYDROCHLORIDE 10 MG: 10 TABLET ORAL at 08:43

## 2022-10-29 RX ADMIN — OCTREOTIDE ACETATE 100 MCG: 500 INJECTION, SOLUTION INTRAVENOUS; SUBCUTANEOUS at 14:18

## 2022-10-29 RX ADMIN — GABAPENTIN 100 MG: 100 CAPSULE ORAL at 21:58

## 2022-10-29 RX ADMIN — BUDESONIDE AND FORMOTEROL FUMARATE DIHYDRATE 2 PUFF: 160; 4.5 AEROSOL RESPIRATORY (INHALATION) at 20:48

## 2022-10-29 RX ADMIN — OXYCODONE HYDROCHLORIDE 5 MG: 5 TABLET ORAL at 22:55

## 2022-10-29 RX ADMIN — VALSARTAN 160 MG: 160 TABLET, FILM COATED ORAL at 21:59

## 2022-10-29 RX ADMIN — ACETAMINOPHEN 1000 MG: 500 TABLET ORAL at 14:18

## 2022-10-29 RX ADMIN — BUDESONIDE AND FORMOTEROL FUMARATE DIHYDRATE 2 PUFF: 160; 4.5 AEROSOL RESPIRATORY (INHALATION) at 07:45

## 2022-10-29 RX ADMIN — OCTREOTIDE ACETATE 100 MCG: 500 INJECTION, SOLUTION INTRAVENOUS; SUBCUTANEOUS at 23:03

## 2022-10-29 RX ADMIN — ACETAMINOPHEN 1000 MG: 500 TABLET ORAL at 05:53

## 2022-10-29 RX ADMIN — SODIUM CHLORIDE 400 ML: 9 INJECTION, SOLUTION INTRAVENOUS at 04:51

## 2022-10-29 RX ADMIN — ENOXAPARIN SODIUM 40 MG: 100 INJECTION SUBCUTANEOUS at 21:58

## 2022-10-29 RX ADMIN — OCTREOTIDE ACETATE 100 MCG: 500 INJECTION, SOLUTION INTRAVENOUS; SUBCUTANEOUS at 05:54

## 2022-10-29 RX ADMIN — GABAPENTIN 100 MG: 100 CAPSULE ORAL at 14:18

## 2022-10-29 RX ADMIN — SODIUM CHLORIDE 30 ML/HR: 9 INJECTION, SOLUTION INTRAVENOUS at 00:42

## 2022-10-30 ENCOUNTER — APPOINTMENT (OUTPATIENT)
Dept: GENERAL RADIOLOGY | Facility: HOSPITAL | Age: 77
End: 2022-10-30

## 2022-10-30 LAB
ALBUMIN SERPL-MCNC: 2.5 G/DL (ref 3.5–5.2)
ALBUMIN/GLOB SERPL: 0.8 G/DL
ALP SERPL-CCNC: 139 U/L (ref 39–117)
ALT SERPL W P-5'-P-CCNC: 46 U/L (ref 1–33)
ANION GAP SERPL CALCULATED.3IONS-SCNC: 10.1 MMOL/L (ref 5–15)
AST SERPL-CCNC: 34 U/L (ref 1–32)
BILIRUB SERPL-MCNC: 0.4 MG/DL (ref 0–1.2)
BUN SERPL-MCNC: 48 MG/DL (ref 8–23)
BUN/CREAT SERPL: 49.5 (ref 7–25)
CALCIUM SPEC-SCNC: 7.9 MG/DL (ref 8.6–10.5)
CHLORIDE SERPL-SCNC: 110 MMOL/L (ref 98–107)
CO2 SERPL-SCNC: 18.9 MMOL/L (ref 22–29)
CREAT SERPL-MCNC: 0.97 MG/DL (ref 0.57–1)
DEPRECATED RDW RBC AUTO: 45.4 FL (ref 37–54)
EGFRCR SERPLBLD CKD-EPI 2021: 60.7 ML/MIN/1.73
ERYTHROCYTE [DISTWIDTH] IN BLOOD BY AUTOMATED COUNT: 13.8 % (ref 12.3–15.4)
GLOBULIN UR ELPH-MCNC: 3 GM/DL
GLUCOSE SERPL-MCNC: 97 MG/DL (ref 65–99)
HCT VFR BLD AUTO: 37.6 % (ref 34–46.6)
HGB BLD-MCNC: 12.6 G/DL (ref 12–15.9)
MCH RBC QN AUTO: 30.1 PG (ref 26.6–33)
MCHC RBC AUTO-ENTMCNC: 33.5 G/DL (ref 31.5–35.7)
MCV RBC AUTO: 90 FL (ref 79–97)
PLATELET # BLD AUTO: 297 10*3/MM3 (ref 140–450)
PMV BLD AUTO: 10 FL (ref 6–12)
POTASSIUM SERPL-SCNC: 4.3 MMOL/L (ref 3.5–5.2)
PROT SERPL-MCNC: 5.5 G/DL (ref 6–8.5)
RBC # BLD AUTO: 4.18 10*6/MM3 (ref 3.77–5.28)
SODIUM SERPL-SCNC: 139 MMOL/L (ref 136–145)
WBC NRBC COR # BLD: 7.78 10*3/MM3 (ref 3.4–10.8)

## 2022-10-30 PROCEDURE — 94761 N-INVAS EAR/PLS OXIMETRY MLT: CPT

## 2022-10-30 PROCEDURE — 71045 X-RAY EXAM CHEST 1 VIEW: CPT

## 2022-10-30 PROCEDURE — 94799 UNLISTED PULMONARY SVC/PX: CPT

## 2022-10-30 PROCEDURE — 85027 COMPLETE CBC AUTOMATED: CPT | Performed by: SURGERY

## 2022-10-30 PROCEDURE — 80053 COMPREHEN METABOLIC PANEL: CPT | Performed by: SURGERY

## 2022-10-30 PROCEDURE — 25010000002 ENOXAPARIN PER 10 MG: Performed by: SURGERY

## 2022-10-30 PROCEDURE — 25010000002 OCTREOTIDE PER 25 MCG: Performed by: NURSE PRACTITIONER

## 2022-10-30 PROCEDURE — 99232 SBSQ HOSP IP/OBS MODERATE 35: CPT | Performed by: NURSE PRACTITIONER

## 2022-10-30 PROCEDURE — 25010000002 ONDANSETRON PER 1 MG: Performed by: NURSE PRACTITIONER

## 2022-10-30 PROCEDURE — 94664 DEMO&/EVAL PT USE INHALER: CPT

## 2022-10-30 RX ADMIN — ACETAMINOPHEN 1000 MG: 500 TABLET ORAL at 06:36

## 2022-10-30 RX ADMIN — ACETAMINOPHEN 1000 MG: 500 TABLET ORAL at 16:52

## 2022-10-30 RX ADMIN — SODIUM CHLORIDE 100 ML/HR: 9 INJECTION, SOLUTION INTRAVENOUS at 21:37

## 2022-10-30 RX ADMIN — CETIRIZINE HYDROCHLORIDE 10 MG: 10 TABLET ORAL at 08:05

## 2022-10-30 RX ADMIN — OCTREOTIDE ACETATE 100 MCG: 500 INJECTION, SOLUTION INTRAVENOUS; SUBCUTANEOUS at 21:37

## 2022-10-30 RX ADMIN — ATORVASTATIN CALCIUM 40 MG: 20 TABLET, FILM COATED ORAL at 08:04

## 2022-10-30 RX ADMIN — GABAPENTIN 100 MG: 100 CAPSULE ORAL at 06:36

## 2022-10-30 RX ADMIN — ACETAMINOPHEN 1000 MG: 500 TABLET ORAL at 21:37

## 2022-10-30 RX ADMIN — GABAPENTIN 100 MG: 100 CAPSULE ORAL at 21:37

## 2022-10-30 RX ADMIN — ESTRADIOL 2 MG: 2 TABLET ORAL at 08:04

## 2022-10-30 RX ADMIN — OCTREOTIDE ACETATE 100 MCG: 500 INJECTION, SOLUTION INTRAVENOUS; SUBCUTANEOUS at 16:52

## 2022-10-30 RX ADMIN — DIAZEPAM 2 MG: 2 TABLET ORAL at 21:37

## 2022-10-30 RX ADMIN — TIOTROPIUM BROMIDE INHALATION SPRAY 1 PUFF: 3.12 SPRAY, METERED RESPIRATORY (INHALATION) at 07:46

## 2022-10-30 RX ADMIN — VALSARTAN 160 MG: 160 TABLET, FILM COATED ORAL at 08:05

## 2022-10-30 RX ADMIN — OCTREOTIDE ACETATE 100 MCG: 500 INJECTION, SOLUTION INTRAVENOUS; SUBCUTANEOUS at 06:36

## 2022-10-30 RX ADMIN — ONDANSETRON 4 MG: 2 INJECTION INTRAMUSCULAR; INTRAVENOUS at 12:31

## 2022-10-30 RX ADMIN — BUDESONIDE AND FORMOTEROL FUMARATE DIHYDRATE 2 PUFF: 160; 4.5 AEROSOL RESPIRATORY (INHALATION) at 19:05

## 2022-10-30 RX ADMIN — CARVEDILOL 25 MG: 25 TABLET, FILM COATED ORAL at 08:05

## 2022-10-30 RX ADMIN — OXYCODONE HYDROCHLORIDE 5 MG: 5 TABLET ORAL at 06:36

## 2022-10-30 RX ADMIN — ENOXAPARIN SODIUM 40 MG: 100 INJECTION SUBCUTANEOUS at 20:42

## 2022-10-30 RX ADMIN — FLUTICASONE PROPIONATE 2 SPRAY: 50 SPRAY, METERED NASAL at 08:34

## 2022-10-30 RX ADMIN — GABAPENTIN 100 MG: 100 CAPSULE ORAL at 16:52

## 2022-10-30 RX ADMIN — BUDESONIDE AND FORMOTEROL FUMARATE DIHYDRATE 2 PUFF: 160; 4.5 AEROSOL RESPIRATORY (INHALATION) at 07:45

## 2022-10-31 ENCOUNTER — ANESTHESIA (OUTPATIENT)
Dept: PERIOP | Facility: HOSPITAL | Age: 77
End: 2022-10-31

## 2022-10-31 ENCOUNTER — ANESTHESIA EVENT (OUTPATIENT)
Dept: PERIOP | Facility: HOSPITAL | Age: 77
End: 2022-10-31

## 2022-10-31 ENCOUNTER — APPOINTMENT (OUTPATIENT)
Dept: GENERAL RADIOLOGY | Facility: HOSPITAL | Age: 77
End: 2022-10-31

## 2022-10-31 LAB
ABO GROUP BLD: NORMAL
ANION GAP SERPL CALCULATED.3IONS-SCNC: 8 MMOL/L (ref 5–15)
APTT PPP: 26.5 SECONDS (ref 22.7–35.4)
BACTERIA SPEC ANAEROBE CULT: NORMAL
BASOPHILS # BLD AUTO: 0.03 10*3/MM3 (ref 0–0.2)
BASOPHILS NFR BLD AUTO: 0.4 % (ref 0–1.5)
BLD GP AB SCN SERPL QL: NEGATIVE
BUN SERPL-MCNC: 31 MG/DL (ref 8–23)
BUN/CREAT SERPL: 44.3 (ref 7–25)
CALCIUM SPEC-SCNC: 7.7 MG/DL (ref 8.6–10.5)
CHLORIDE SERPL-SCNC: 115 MMOL/L (ref 98–107)
CO2 SERPL-SCNC: 20 MMOL/L (ref 22–29)
CREAT SERPL-MCNC: 0.7 MG/DL (ref 0.57–1)
DEPRECATED RDW RBC AUTO: 44.5 FL (ref 37–54)
EGFRCR SERPLBLD CKD-EPI 2021: 89.8 ML/MIN/1.73
EOSINOPHIL # BLD AUTO: 0.09 10*3/MM3 (ref 0–0.4)
EOSINOPHIL NFR BLD AUTO: 1.2 % (ref 0.3–6.2)
ERYTHROCYTE [DISTWIDTH] IN BLOOD BY AUTOMATED COUNT: 13.8 % (ref 12.3–15.4)
GLUCOSE SERPL-MCNC: 107 MG/DL (ref 65–99)
HCT VFR BLD AUTO: 35 % (ref 34–46.6)
HGB BLD-MCNC: 11.9 G/DL (ref 12–15.9)
IMM GRANULOCYTES # BLD AUTO: 0.03 10*3/MM3 (ref 0–0.05)
IMM GRANULOCYTES NFR BLD AUTO: 0.4 % (ref 0–0.5)
INR PPP: 1.08 (ref 0.9–1.1)
LYMPHOCYTES # BLD AUTO: 0.94 10*3/MM3 (ref 0.7–3.1)
LYMPHOCYTES NFR BLD AUTO: 12.9 % (ref 19.6–45.3)
MCH RBC QN AUTO: 30.6 PG (ref 26.6–33)
MCHC RBC AUTO-ENTMCNC: 34 G/DL (ref 31.5–35.7)
MCV RBC AUTO: 90 FL (ref 79–97)
MONOCYTES # BLD AUTO: 0.89 10*3/MM3 (ref 0.1–0.9)
MONOCYTES NFR BLD AUTO: 12.2 % (ref 5–12)
NEUTROPHILS NFR BLD AUTO: 5.33 10*3/MM3 (ref 1.7–7)
NEUTROPHILS NFR BLD AUTO: 72.9 % (ref 42.7–76)
NRBC BLD AUTO-RTO: 0 /100 WBC (ref 0–0.2)
PLATELET # BLD AUTO: 321 10*3/MM3 (ref 140–450)
PMV BLD AUTO: 9.9 FL (ref 6–12)
POTASSIUM SERPL-SCNC: 3.8 MMOL/L (ref 3.5–5.2)
PROTHROMBIN TIME: 14.2 SECONDS (ref 11.7–14.2)
RBC # BLD AUTO: 3.89 10*6/MM3 (ref 3.77–5.28)
RH BLD: POSITIVE
SODIUM SERPL-SCNC: 143 MMOL/L (ref 136–145)
T&S EXPIRATION DATE: NORMAL
WBC NRBC COR # BLD: 7.31 10*3/MM3 (ref 3.4–10.8)

## 2022-10-31 PROCEDURE — 86900 BLOOD TYPING SEROLOGIC ABO: CPT | Performed by: NURSE PRACTITIONER

## 2022-10-31 PROCEDURE — 25010000002 OCTREOTIDE PER 25 MCG: Performed by: NURSE PRACTITIONER

## 2022-10-31 PROCEDURE — 94664 DEMO&/EVAL PT USE INHALER: CPT

## 2022-10-31 PROCEDURE — 94761 N-INVAS EAR/PLS OXIMETRY MLT: CPT

## 2022-10-31 PROCEDURE — 25010000002 FENTANYL CITRATE (PF) 50 MCG/ML SOLUTION: Performed by: ANESTHESIOLOGY

## 2022-10-31 PROCEDURE — 71045 X-RAY EXAM CHEST 1 VIEW: CPT

## 2022-10-31 PROCEDURE — 80048 BASIC METABOLIC PNL TOTAL CA: CPT | Performed by: NURSE PRACTITIONER

## 2022-10-31 PROCEDURE — C9290 INJ, BUPIVACAINE LIPOSOME: HCPCS | Performed by: SURGERY

## 2022-10-31 PROCEDURE — 25010000002 OCTREOTIDE PER 25 MCG: Performed by: SURGERY

## 2022-10-31 PROCEDURE — 25010000002 HYDROMORPHONE PER 4 MG: Performed by: ANESTHESIOLOGY

## 2022-10-31 PROCEDURE — 25010000002 CEFAZOLIN IN DEXTROSE 2-4 GM/100ML-% SOLUTION: Performed by: NURSE PRACTITIONER

## 2022-10-31 PROCEDURE — 85730 THROMBOPLASTIN TIME PARTIAL: CPT | Performed by: NURSE PRACTITIONER

## 2022-10-31 PROCEDURE — 25010000002 PROPOFOL 10 MG/ML EMULSION: Performed by: ANESTHESIOLOGY

## 2022-10-31 PROCEDURE — 07LK4ZZ OCCLUSION OF THORACIC DUCT, PERCUTANEOUS ENDOSCOPIC APPROACH: ICD-10-PCS | Performed by: SURGERY

## 2022-10-31 PROCEDURE — 25010000002 NEOSTIGMINE 5 MG/10ML SOLUTION: Performed by: ANESTHESIOLOGY

## 2022-10-31 PROCEDURE — 86850 RBC ANTIBODY SCREEN: CPT | Performed by: NURSE PRACTITIONER

## 2022-10-31 PROCEDURE — 25010000002 MIDAZOLAM PER 1 MG: Performed by: ANESTHESIOLOGY

## 2022-10-31 PROCEDURE — 25010000002 DROPERIDOL PER 5 MG: Performed by: SURGERY

## 2022-10-31 PROCEDURE — 94799 UNLISTED PULMONARY SVC/PX: CPT

## 2022-10-31 PROCEDURE — 0 BUPIVACAINE LIPOSOME 1.3 % SUSPENSION 20 ML VIAL: Performed by: SURGERY

## 2022-10-31 PROCEDURE — 86901 BLOOD TYPING SEROLOGIC RH(D): CPT | Performed by: NURSE PRACTITIONER

## 2022-10-31 PROCEDURE — 85610 PROTHROMBIN TIME: CPT | Performed by: NURSE PRACTITIONER

## 2022-10-31 PROCEDURE — 99024 POSTOP FOLLOW-UP VISIT: CPT | Performed by: NURSE PRACTITIONER

## 2022-10-31 PROCEDURE — 85025 COMPLETE CBC W/AUTO DIFF WBC: CPT | Performed by: NURSE PRACTITIONER

## 2022-10-31 PROCEDURE — 21899 UNLISTED PX NECK/THORAX: CPT | Performed by: SURGERY

## 2022-10-31 DEVICE — HORIZON TI SMALL RED 6 CLIPS/CART
Type: IMPLANTABLE DEVICE | Site: CHEST | Status: FUNCTIONAL
Brand: WECK

## 2022-10-31 RX ORDER — CEFAZOLIN SODIUM 2 G/100ML
2 INJECTION, SOLUTION INTRAVENOUS ONCE
Status: COMPLETED | OUTPATIENT
Start: 2022-10-31 | End: 2022-10-31

## 2022-10-31 RX ORDER — NEOSTIGMINE METHYLSULFATE 0.5 MG/ML
INJECTION, SOLUTION INTRAVENOUS AS NEEDED
Status: DISCONTINUED | OUTPATIENT
Start: 2022-10-31 | End: 2022-10-31 | Stop reason: SURG

## 2022-10-31 RX ORDER — OXYCODONE HYDROCHLORIDE 5 MG/1
5 TABLET ORAL EVERY 4 HOURS PRN
Status: DISCONTINUED | OUTPATIENT
Start: 2022-10-31 | End: 2022-11-02

## 2022-10-31 RX ORDER — MAGNESIUM HYDROXIDE 1200 MG/15ML
LIQUID ORAL AS NEEDED
Status: DISCONTINUED | OUTPATIENT
Start: 2022-10-31 | End: 2022-10-31 | Stop reason: HOSPADM

## 2022-10-31 RX ORDER — SODIUM CHLORIDE 0.9 % (FLUSH) 0.9 %
10 SYRINGE (ML) INJECTION AS NEEDED
Status: DISCONTINUED | OUTPATIENT
Start: 2022-10-31 | End: 2022-10-31 | Stop reason: HOSPADM

## 2022-10-31 RX ORDER — SODIUM CHLORIDE, SODIUM LACTATE, POTASSIUM CHLORIDE, CALCIUM CHLORIDE 600; 310; 30; 20 MG/100ML; MG/100ML; MG/100ML; MG/100ML
30 INJECTION, SOLUTION INTRAVENOUS CONTINUOUS
Status: DISCONTINUED | OUTPATIENT
Start: 2022-10-31 | End: 2022-11-01

## 2022-10-31 RX ORDER — DROPERIDOL 2.5 MG/ML
0.62 INJECTION, SOLUTION INTRAMUSCULAR; INTRAVENOUS ONCE
Status: DISCONTINUED | OUTPATIENT
Start: 2022-10-31 | End: 2022-10-31 | Stop reason: HOSPADM

## 2022-10-31 RX ORDER — PROPOFOL 10 MG/ML
VIAL (ML) INTRAVENOUS AS NEEDED
Status: DISCONTINUED | OUTPATIENT
Start: 2022-10-31 | End: 2022-10-31 | Stop reason: SURG

## 2022-10-31 RX ORDER — FAMOTIDINE 10 MG/ML
20 INJECTION, SOLUTION INTRAVENOUS
Status: COMPLETED | OUTPATIENT
Start: 2022-10-31 | End: 2022-10-31

## 2022-10-31 RX ORDER — SCOLOPAMINE TRANSDERMAL SYSTEM 1 MG/1
1 PATCH, EXTENDED RELEASE TRANSDERMAL ONCE
Status: DISCONTINUED | OUTPATIENT
Start: 2022-10-31 | End: 2022-10-31

## 2022-10-31 RX ORDER — DROPERIDOL 2.5 MG/ML
0.62 INJECTION, SOLUTION INTRAMUSCULAR; INTRAVENOUS ONCE
Status: COMPLETED | OUTPATIENT
Start: 2022-10-31 | End: 2022-10-31

## 2022-10-31 RX ORDER — LIDOCAINE HYDROCHLORIDE 20 MG/ML
INJECTION, SOLUTION INFILTRATION; PERINEURAL AS NEEDED
Status: DISCONTINUED | OUTPATIENT
Start: 2022-10-31 | End: 2022-10-31 | Stop reason: SURG

## 2022-10-31 RX ORDER — EPHEDRINE SULFATE 50 MG/ML
INJECTION INTRAVENOUS AS NEEDED
Status: DISCONTINUED | OUTPATIENT
Start: 2022-10-31 | End: 2022-10-31 | Stop reason: SURG

## 2022-10-31 RX ORDER — FENTANYL CITRATE 50 UG/ML
50 INJECTION, SOLUTION INTRAMUSCULAR; INTRAVENOUS
Status: DISCONTINUED | OUTPATIENT
Start: 2022-10-31 | End: 2022-10-31 | Stop reason: HOSPADM

## 2022-10-31 RX ORDER — SODIUM CHLORIDE 9 MG/ML
INJECTION, SOLUTION INTRAVENOUS AS NEEDED
Status: DISCONTINUED | OUTPATIENT
Start: 2022-10-31 | End: 2022-10-31 | Stop reason: HOSPADM

## 2022-10-31 RX ORDER — SODIUM CHLORIDE 0.9 % (FLUSH) 0.9 %
10 SYRINGE (ML) INJECTION EVERY 12 HOURS SCHEDULED
Status: DISCONTINUED | OUTPATIENT
Start: 2022-10-31 | End: 2022-10-31 | Stop reason: HOSPADM

## 2022-10-31 RX ORDER — FENTANYL CITRATE 50 UG/ML
INJECTION, SOLUTION INTRAMUSCULAR; INTRAVENOUS AS NEEDED
Status: COMPLETED | OUTPATIENT
Start: 2022-10-31 | End: 2022-10-31

## 2022-10-31 RX ORDER — HYDROMORPHONE HYDROCHLORIDE 1 MG/ML
0.25 INJECTION, SOLUTION INTRAMUSCULAR; INTRAVENOUS; SUBCUTANEOUS
Status: DISCONTINUED | OUTPATIENT
Start: 2022-10-31 | End: 2022-10-31 | Stop reason: HOSPADM

## 2022-10-31 RX ORDER — ROCURONIUM BROMIDE 10 MG/ML
INJECTION, SOLUTION INTRAVENOUS AS NEEDED
Status: DISCONTINUED | OUTPATIENT
Start: 2022-10-31 | End: 2022-10-31 | Stop reason: SURG

## 2022-10-31 RX ORDER — MIDAZOLAM HYDROCHLORIDE 1 MG/ML
0.5 INJECTION INTRAMUSCULAR; INTRAVENOUS
Status: DISCONTINUED | OUTPATIENT
Start: 2022-10-31 | End: 2022-10-31 | Stop reason: HOSPADM

## 2022-10-31 RX ORDER — GLYCOPYRROLATE 0.2 MG/ML
INJECTION INTRAMUSCULAR; INTRAVENOUS AS NEEDED
Status: DISCONTINUED | OUTPATIENT
Start: 2022-10-31 | End: 2022-10-31 | Stop reason: SURG

## 2022-10-31 RX ORDER — ACETAMINOPHEN 500 MG
1000 TABLET ORAL EVERY 8 HOURS
Status: DISCONTINUED | OUTPATIENT
Start: 2022-10-31 | End: 2022-11-07 | Stop reason: HOSPADM

## 2022-10-31 RX ORDER — ENOXAPARIN SODIUM 100 MG/ML
30 INJECTION SUBCUTANEOUS NIGHTLY
Status: DISCONTINUED | OUTPATIENT
Start: 2022-10-31 | End: 2022-11-07 | Stop reason: HOSPADM

## 2022-10-31 RX ORDER — MIDAZOLAM HYDROCHLORIDE 1 MG/ML
INJECTION INTRAMUSCULAR; INTRAVENOUS AS NEEDED
Status: COMPLETED | OUTPATIENT
Start: 2022-10-31 | End: 2022-10-31

## 2022-10-31 RX ORDER — HYDROMORPHONE HYDROCHLORIDE 1 MG/ML
0.2 INJECTION, SOLUTION INTRAMUSCULAR; INTRAVENOUS; SUBCUTANEOUS EVERY 4 HOURS PRN
Status: DISCONTINUED | OUTPATIENT
Start: 2022-10-31 | End: 2022-11-02

## 2022-10-31 RX ADMIN — FENTANYL CITRATE 50 MCG: 50 INJECTION INTRAMUSCULAR; INTRAVENOUS at 18:24

## 2022-10-31 RX ADMIN — CETIRIZINE HYDROCHLORIDE 10 MG: 10 TABLET ORAL at 08:05

## 2022-10-31 RX ADMIN — FENTANYL CITRATE 50 MCG: 50 INJECTION INTRAMUSCULAR; INTRAVENOUS at 20:20

## 2022-10-31 RX ADMIN — LIDOCAINE HYDROCHLORIDE 100 MG: 20 INJECTION, SOLUTION INFILTRATION; PERINEURAL at 17:48

## 2022-10-31 RX ADMIN — OCTREOTIDE ACETATE 100 MCG: 500 INJECTION, SOLUTION INTRAVENOUS; SUBCUTANEOUS at 23:03

## 2022-10-31 RX ADMIN — ACETAMINOPHEN 1000 MG: 500 TABLET ORAL at 23:03

## 2022-10-31 RX ADMIN — HYDROMORPHONE HYDROCHLORIDE 0.25 MG: 1 INJECTION, SOLUTION INTRAMUSCULAR; INTRAVENOUS; SUBCUTANEOUS at 20:52

## 2022-10-31 RX ADMIN — GABAPENTIN 100 MG: 100 CAPSULE ORAL at 05:58

## 2022-10-31 RX ADMIN — GLYCOPYRROLATE 0.3 MCG: 1 INJECTION INTRAMUSCULAR; INTRAVENOUS at 19:29

## 2022-10-31 RX ADMIN — ACETAMINOPHEN 1000 MG: 500 TABLET ORAL at 05:58

## 2022-10-31 RX ADMIN — NEOSTIGMINE METHYLSULFATE 2 MG: 0.5 INJECTION INTRAVENOUS at 19:28

## 2022-10-31 RX ADMIN — VALSARTAN 160 MG: 160 TABLET, FILM COATED ORAL at 23:04

## 2022-10-31 RX ADMIN — VALSARTAN 160 MG: 160 TABLET, FILM COATED ORAL at 08:06

## 2022-10-31 RX ADMIN — CEFAZOLIN SODIUM 2 G: 2 INJECTION, SOLUTION INTRAVENOUS at 17:22

## 2022-10-31 RX ADMIN — ACETAMINOPHEN 1000 MG: 500 TABLET ORAL at 14:26

## 2022-10-31 RX ADMIN — DROPERIDOL 0.62 MG: 2.5 INJECTION, SOLUTION INTRAMUSCULAR; INTRAVENOUS at 20:15

## 2022-10-31 RX ADMIN — OXYCODONE HYDROCHLORIDE 5 MG: 5 TABLET ORAL at 07:15

## 2022-10-31 RX ADMIN — GABAPENTIN 100 MG: 100 CAPSULE ORAL at 23:03

## 2022-10-31 RX ADMIN — PROPOFOL 120 MG: 10 INJECTION, EMULSION INTRAVENOUS at 17:48

## 2022-10-31 RX ADMIN — SODIUM CHLORIDE 100 ML/HR: 9 INJECTION, SOLUTION INTRAVENOUS at 06:45

## 2022-10-31 RX ADMIN — ESTRADIOL 2 MG: 2 TABLET ORAL at 08:05

## 2022-10-31 RX ADMIN — FENTANYL CITRATE 25 MCG: 50 INJECTION INTRAMUSCULAR; INTRAVENOUS at 17:13

## 2022-10-31 RX ADMIN — FLUTICASONE PROPIONATE 2 SPRAY: 50 SPRAY, METERED NASAL at 08:06

## 2022-10-31 RX ADMIN — BUDESONIDE AND FORMOTEROL FUMARATE DIHYDRATE 2 PUFF: 160; 4.5 AEROSOL RESPIRATORY (INHALATION) at 07:20

## 2022-10-31 RX ADMIN — ROCURONIUM BROMIDE 30 MG: 10 INJECTION, SOLUTION INTRAVENOUS at 17:48

## 2022-10-31 RX ADMIN — OCTREOTIDE ACETATE 100 MCG: 500 INJECTION, SOLUTION INTRAVENOUS; SUBCUTANEOUS at 05:58

## 2022-10-31 RX ADMIN — ATORVASTATIN CALCIUM 40 MG: 20 TABLET, FILM COATED ORAL at 08:05

## 2022-10-31 RX ADMIN — SODIUM CHLORIDE, POTASSIUM CHLORIDE, SODIUM LACTATE AND CALCIUM CHLORIDE 30 ML/HR: 600; 310; 30; 20 INJECTION, SOLUTION INTRAVENOUS at 17:24

## 2022-10-31 RX ADMIN — EPHEDRINE SULFATE 10 MG: 50 INJECTION INTRAVENOUS at 18:04

## 2022-10-31 RX ADMIN — SCOPALAMINE 1 PATCH: 1 PATCH, EXTENDED RELEASE TRANSDERMAL at 17:20

## 2022-10-31 RX ADMIN — DIAZEPAM 2 MG: 2 TABLET ORAL at 23:09

## 2022-10-31 RX ADMIN — TIOTROPIUM BROMIDE INHALATION SPRAY 1 PUFF: 3.12 SPRAY, METERED RESPIRATORY (INHALATION) at 07:17

## 2022-10-31 RX ADMIN — FAMOTIDINE 20 MG: 10 INJECTION INTRAVENOUS at 17:14

## 2022-10-31 RX ADMIN — FENTANYL CITRATE 50 MCG: 50 INJECTION INTRAMUSCULAR; INTRAVENOUS at 18:00

## 2022-10-31 RX ADMIN — GABAPENTIN 100 MG: 100 CAPSULE ORAL at 14:27

## 2022-10-31 NOTE — ANESTHESIA PREPROCEDURE EVALUATION
Anesthesia Evaluation     Patient summary reviewed   history of anesthetic complications:               Airway   Mallampati: II  Neck ROM: full  No difficulty expected  Dental      Pulmonary     breath sounds clear to auscultation  (+) COPD,   Cardiovascular     ECG reviewed  Rhythm: regular    (+) hypertension,       Neuro/Psych  GI/Hepatic/Renal/Endo      Musculoskeletal     Abdominal    Substance History      OB/GYN          Other      history of cancer remission      Other Comment: Hb 11.9                  Anesthesia Plan    ASA 3     general       Anesthetic plan, risks, benefits, and alternatives have been provided, discussed and informed consent has been obtained with: patient.    Use of blood products discussed with patient .       CODE STATUS:    Level Of Support Discussed With: Patient  Code Status (Patient has no pulse and is not breathing): CPR (Attempt to Resuscitate)  Medical Interventions (Patient has pulse or is breathing): Full Support  Release to patient: Routine Release

## 2022-10-31 NOTE — ANESTHESIA PROCEDURE NOTES
Airway  Date/Time: 10/31/2022 5:50 PM  Airway not difficult    General Information and Staff    Patient location during procedure: OR  Anesthesiologist: Isaiah Griffin MD    Indications and Patient Condition  Indications for airway management: airway protection    Preoxygenated: yes  MILS maintained throughout  Mask difficulty assessment: 1 - vent by mask    Final Airway Details  Final airway type: endotracheal airway      Successful airway: EBT - double lumen left  Cuffed: yes   Successful intubation technique: direct laryngoscopy and flexible bronchoscopy  Endotracheal tube insertion site: oral  Blade: [place]ast[place]  Blade size: 2  EBT DL size (fr): 35  Cormack-Lehane Classification: grade I - full view of glottis  Placement verified by: chest auscultation and bronchoscopy   Measured from: lips  ETT/EBT  to lips (cm): 26  Number of attempts at approach: 1  Assessment: lips, teeth, and gum same as pre-op and atraumatic intubation

## 2022-11-01 ENCOUNTER — APPOINTMENT (OUTPATIENT)
Dept: GENERAL RADIOLOGY | Facility: HOSPITAL | Age: 77
End: 2022-11-01

## 2022-11-01 LAB
ANION GAP SERPL CALCULATED.3IONS-SCNC: 9 MMOL/L (ref 5–15)
BUN SERPL-MCNC: 20 MG/DL (ref 8–23)
BUN/CREAT SERPL: 36.4 (ref 7–25)
CALCIUM SPEC-SCNC: 7.6 MG/DL (ref 8.6–10.5)
CHLORIDE SERPL-SCNC: 114 MMOL/L (ref 98–107)
CO2 SERPL-SCNC: 19 MMOL/L (ref 22–29)
CREAT SERPL-MCNC: 0.55 MG/DL (ref 0.57–1)
DEPRECATED RDW RBC AUTO: 45.6 FL (ref 37–54)
EGFRCR SERPLBLD CKD-EPI 2021: 95.1 ML/MIN/1.73
ERYTHROCYTE [DISTWIDTH] IN BLOOD BY AUTOMATED COUNT: 13.8 % (ref 12.3–15.4)
GLUCOSE SERPL-MCNC: 101 MG/DL (ref 65–99)
HCT VFR BLD AUTO: 34.5 % (ref 34–46.6)
HGB BLD-MCNC: 11.3 G/DL (ref 12–15.9)
MCH RBC QN AUTO: 29.7 PG (ref 26.6–33)
MCHC RBC AUTO-ENTMCNC: 32.8 G/DL (ref 31.5–35.7)
MCV RBC AUTO: 90.8 FL (ref 79–97)
PLATELET # BLD AUTO: 323 10*3/MM3 (ref 140–450)
PMV BLD AUTO: 9.4 FL (ref 6–12)
POTASSIUM SERPL-SCNC: 4.3 MMOL/L (ref 3.5–5.2)
RBC # BLD AUTO: 3.8 10*6/MM3 (ref 3.77–5.28)
SODIUM SERPL-SCNC: 142 MMOL/L (ref 136–145)
WBC NRBC COR # BLD: 8.49 10*3/MM3 (ref 3.4–10.8)

## 2022-11-01 PROCEDURE — 94799 UNLISTED PULMONARY SVC/PX: CPT

## 2022-11-01 PROCEDURE — 94761 N-INVAS EAR/PLS OXIMETRY MLT: CPT

## 2022-11-01 PROCEDURE — 25010000002 OCTREOTIDE PER 25 MCG: Performed by: SURGERY

## 2022-11-01 PROCEDURE — 99024 POSTOP FOLLOW-UP VISIT: CPT | Performed by: NURSE PRACTITIONER

## 2022-11-01 PROCEDURE — 94760 N-INVAS EAR/PLS OXIMETRY 1: CPT

## 2022-11-01 PROCEDURE — 85027 COMPLETE CBC AUTOMATED: CPT | Performed by: SURGERY

## 2022-11-01 PROCEDURE — 94664 DEMO&/EVAL PT USE INHALER: CPT

## 2022-11-01 PROCEDURE — 80048 BASIC METABOLIC PNL TOTAL CA: CPT | Performed by: SURGERY

## 2022-11-01 PROCEDURE — 25010000002 ENOXAPARIN PER 10 MG: Performed by: SURGERY

## 2022-11-01 PROCEDURE — 71045 X-RAY EXAM CHEST 1 VIEW: CPT

## 2022-11-01 RX ADMIN — ACETAMINOPHEN 1000 MG: 500 TABLET ORAL at 16:48

## 2022-11-01 RX ADMIN — OXYCODONE HYDROCHLORIDE 5 MG: 5 TABLET ORAL at 02:17

## 2022-11-01 RX ADMIN — BUDESONIDE AND FORMOTEROL FUMARATE DIHYDRATE 2 PUFF: 160; 4.5 AEROSOL RESPIRATORY (INHALATION) at 19:45

## 2022-11-01 RX ADMIN — VALSARTAN 160 MG: 160 TABLET, FILM COATED ORAL at 08:28

## 2022-11-01 RX ADMIN — FLUTICASONE PROPIONATE 2 SPRAY: 50 SPRAY, METERED NASAL at 08:28

## 2022-11-01 RX ADMIN — GABAPENTIN 100 MG: 100 CAPSULE ORAL at 21:52

## 2022-11-01 RX ADMIN — ATORVASTATIN CALCIUM 40 MG: 20 TABLET, FILM COATED ORAL at 08:28

## 2022-11-01 RX ADMIN — CETIRIZINE HYDROCHLORIDE 10 MG: 10 TABLET ORAL at 08:28

## 2022-11-01 RX ADMIN — ALPRAZOLAM 0.5 MG: 0.5 TABLET ORAL at 21:52

## 2022-11-01 RX ADMIN — ACETAMINOPHEN 1000 MG: 500 TABLET ORAL at 21:52

## 2022-11-01 RX ADMIN — BUDESONIDE AND FORMOTEROL FUMARATE DIHYDRATE 2 PUFF: 160; 4.5 AEROSOL RESPIRATORY (INHALATION) at 07:51

## 2022-11-01 RX ADMIN — ACETAMINOPHEN 1000 MG: 500 TABLET ORAL at 06:21

## 2022-11-01 RX ADMIN — ESTRADIOL 2 MG: 2 TABLET ORAL at 08:28

## 2022-11-01 RX ADMIN — ENOXAPARIN SODIUM 30 MG: 30 INJECTION SUBCUTANEOUS at 20:31

## 2022-11-01 RX ADMIN — GABAPENTIN 100 MG: 100 CAPSULE ORAL at 06:21

## 2022-11-01 RX ADMIN — OCTREOTIDE ACETATE 100 MCG: 500 INJECTION, SOLUTION INTRAVENOUS; SUBCUTANEOUS at 06:21

## 2022-11-01 RX ADMIN — TIOTROPIUM BROMIDE INHALATION SPRAY 1 PUFF: 3.12 SPRAY, METERED RESPIRATORY (INHALATION) at 07:51

## 2022-11-01 RX ADMIN — SODIUM CHLORIDE 100 ML/HR: 9 INJECTION, SOLUTION INTRAVENOUS at 06:20

## 2022-11-01 RX ADMIN — VALSARTAN 160 MG: 160 TABLET, FILM COATED ORAL at 20:31

## 2022-11-01 NOTE — PROGRESS NOTES
"  POST-OPERATIVE NOTE     Chief Complaint: Right upper lobe nodule  S/P: Thoracoscopy with da Henry robot, right upper lobe wedge resection, mediastinal lymph node dissection, flexible bronchoscopy, intercostal nerve block.  POD #: 6   S/P: Right video-assisted thoracoscopy with thoracic duct ligation with da Henry robot.  POD #: 1    Subjective:  Symptoms:  Stable.  She reports chest pain and weakness.  No shortness of breath.    Diet:  Adequate intake.  No nausea or vomiting.    Activity level: Impaired due to weakness.    Pain:  She complains of pain that is mild.  Pain is well controlled.        Objective:  General Appearance:  Comfortable, in no acute distress and in pain.    Vital signs: (most recent): Blood pressure 168/74, pulse 85, temperature 97.5 °F (36.4 °C), temperature source Oral, resp. rate 16, height 152.4 cm (60\"), weight 41.3 kg (91 lb 1.6 oz), SpO2 90 %.  No fever.    Output: Producing urine and producing stool.    Lungs:  Normal effort and normal respiratory rate.  She is not in respiratory distress.  There are decreased breath sounds.  No rales, wheezes or rhonchi.    Heart: Normal rate.  Regular rhythm.    Chest: Symmetric chest wall expansion. Chest wall tenderness present.    Abdomen: Abdomen is soft.  Bowel sounds are normal.     Neurological: Patient is alert and oriented to person, place and time.    Skin:  Warm and dry.            Chest tube:   Site: Right, Clean, Dry, Intact and Securement device intact  Suction: waterseal  Air Leak: Negative  24 Hour Total: 105ml    Results Review:     I reviewed the patient's new clinical results.  I reviewed the patient's new imaging results and agree with the interpretation.  Discussed with Patient, nurse, and Dr. Wood     Assessment & Plan      Ms. Villaseñor continues to improve status post thoracic duct ligation yesterday secondary to chylothorax. This morning's chest x-ray was reviewed demonstrating stable positioning of the right-sided chest tube.  " There is sufficient lung expansion bilaterally.  The chest tube remained to waterseal draining a small amount of thin serosanguineous fluid. Chest tube drainage appears improved. We will continue her chest tube to waterseal and repeat a chest x-ray in the morning. We will resume her regular diet and discontinue subcutaneous octreotide.      Encourage good pulmonary hygiene including use of spirometer, coughing, deep breathing.  Increase activity as able including scheduled walks around nurse station.    DVT prophylaxis: Subcutaneous Lovenox     ABE Rolon  [facility]  11/01/22  15:08 EDT    Patient was seen and assessed while wearing personal protective equipment including facemask, protective eyewear and gloves.  Hand hygiene performed prior to entering the room and upon exiting with doffing of gloves.

## 2022-11-01 NOTE — CASE MANAGEMENT/SOCIAL WORK
Continued Stay Note  [facility]     Patient Name: Aliya Villaseñor  MRN: [id number]  Today's Date: 11/1/2022    Admit Date: 10/26/2022    Plan: Home   Discharge Plan     Row Name 11/01/22 1238       Plan    Plan Home    Patient/Family in Agreement with Plan yes    Plan Comments Met with pt at bedside who confirms plan to return home at discharge.  No identified needs at thsi time.  CCP will continue to follow.  SUMAN Fried RN               Discharge Codes    No documentation.               Expected Discharge Date and Time     Expected Discharge Date Expected Discharge Time    Nov 3, 2022             Jessy Fried, RN

## 2022-11-01 NOTE — PROGRESS NOTES
Nutrition Services    Patient Name:  Aliya Villaseñor  YOB: 1945  MRN: [id number]  Admit Date:  10/26/2022      Comment:  Nutrition follow up. S/P Robot assisted Thoracoscopic right thoracic duct ligation yesterday due to chyle leak. Now on regular diet, 50% po this am, currently sound asleep.     Will change boost breeze to boost plus for more protein/calories and cont to follow and support her nutrition needs.    CLINICAL NUTRITION ASSESSMENT      Reason for Assessment BMI, Follow-up Protocol      Diagnosis/Problem   Dx. Lung lesion, S/P Thoracoscopy with da Henry robot, right upper lobe wedge resection, mediastinal lymph node dissection, flexible bronchoscopy, intercostal nerve block.   Medical/Surgical History Past Medical History:   Diagnosis Date   • Anxiety    • Cancer (HCC)    • COPD (chronic obstructive pulmonary disease) (HCC) 2020    [facility]   • COVID 08/25/2022    IN HOSPITAL AT [place]   • Diverticulitis 2022   • Dizzinesses    • Hypertension    • Melanoma (HCC) 2019    right leg   • Nodule of left lung    • PONV (postoperative nausea and vomiting)    • Stroke (HCC)     AUG 2022       Past Surgical History:   Procedure Laterality Date   • APPENDECTOMY     • COLONOSCOPY     • HYSTERECTOMY     • LAPAROSCOPIC CHOLECYSTECTOMY     • NASAL SEPTUM SURGERY      X2   • SKIN BIOPSY     • THORACOSCOPY Right 10/26/2022    Procedure: THORACOSCOPY WITH DAVINCI ROBOT, RIGHT UPPER LOBE WEDGE RESECTION, MEDIASTINAL LYMPH NODE DISSECTION, FLEXIBLE BRONCHOSCOPY, INTERCOSTAL NERVE BLOCK;  Surgeon: Matt Wood MD;  Location: [facility];  Service: Robotics - DaVinci;  Laterality: Right;   • THORACOSCOPY Right 10/31/2022    Procedure: RIGHT VIDEO ASSISTED THORACOSCOPY WITH THORACIC DUCT LIGATION WITH DAVINCI ROBOT;  Surgeon: Matt Wood MD;  Location: [facility] OR;  Service: Thoracic;  Laterality: Right;   • TONSILLECTOMY          Encounter Information        Nutrition/Diet History:     Food  "Preferences:    Supplements:    Factors Affecting Intake: dislikes hospital food, dislikes modified diet     Anthropometrics        Current Height  Current Weight  BMI kg/m2 Height: 152.4 cm (60\")  Weight: 41.3 kg (91 lb 1.6 oz) (10/26/22 0612)  Body mass index is 17.79 kg/m².       Admission Weight 41.3kg   Ideal Body Weight (IBW) 50.4kg   Usual Body Weight (UBW) 43.4kg (95lb) 10/2021, 44.9kg (99lb) 10/2020   Weight Change/Trend 5% loss in 1 month       Weight History Wt Readings from Last 30 Encounters:   10/26/22 0612 41.3 kg (91 lb 1.6 oz)   10/20/22 0931 41.3 kg (91 lb)   10/06/22 0919 40.8 kg (90 lb)   10/06/22 0835 41.1 kg (90 lb 9.6 oz)   09/22/22 0839 40.1 kg (88 lb 4.8 oz)   09/22/22 0806 40.1 kg (88 lb 6.4 oz)   09/02/22 1023 42.2 kg (93 lb)   09/01/22 1329 42.5 kg (93 lb 9.6 oz)   09/01/22 1504 42.2 kg (93 lb)             Tests/Procedures        Tests/Procedures X-Ray     Labs       Pertinent Labs    Results from last 7 days   Lab Units 11/01/22  0510 10/31/22  1050 10/30/22  0552 10/29/22  1146   SODIUM mmol/L 142 143 139 136   POTASSIUM mmol/L 4.3 3.8 4.3 3.8   CHLORIDE mmol/L 114* 115* 110* 101   CO2 mmol/L 19.0* 20.0* 18.9* 22.9   BUN mg/dL 20 31* 48* 49*   CREATININE mg/dL 0.55* 0.70 0.97 1.56*   CALCIUM mg/dL 7.6* 7.7* 7.9* 8.1*   BILIRUBIN mg/dL  --   --  0.4 0.4   ALK PHOS U/L  --   --  139* 139*   ALT (SGPT) U/L  --   --  46* 55*   AST (SGOT) U/L  --   --  34* 39*   GLUCOSE mg/dL 101* 107* 97 127*     Results from last 7 days   Lab Units 11/01/22  0510 10/31/22  1050 10/30/22  0552   HEMOGLOBIN g/dL 11.3*   < > 12.6   HEMATOCRIT % 34.5   < > 37.6   WBC 10*3/mm3 8.49   < > 7.78   ALBUMIN g/dL  --   --  2.50*    < > = values in this interval not displayed.     Results from last 7 days   Lab Units 11/01/22  0510 10/31/22  1051 10/31/22  1050 10/30/22  0552 10/29/22  1146 10/26/22  1036   INR   --  1.08  --   --   --   --    APTT seconds  --  26.5  --   --   --   --    PLATELETS 10*3/mm3 323  --  " "321 297 272 222     COVID19   Date Value Ref Range Status   09/01/2022 Detected (C) Not Detected - Ref. Range Final     Lab Results   Component Value Date    HGBA1C 6.00 (H) 09/01/2022          Medications           Scheduled Medications acetaminophen, 1,000 mg, Oral, Q8H  atorvastatin, 40 mg, Oral, Daily  budesonide-formoterol, 2 puff, Inhalation, BID - RT   And  tiotropium bromide monohydrate, 1 puff, Inhalation, Daily - RT  cetirizine, 10 mg, Oral, Daily  enoxaparin, 30 mg, Subcutaneous, Nightly  estradiol, 2 mg, Oral, Daily  fluticasone, 2 spray, Nasal, Daily  gabapentin, 100 mg, Oral, Q8H  Scopolamine, 1 patch, Transdermal, Q72H  valsartan, 160 mg, Oral, BID       Infusions lactated ringers, 30 mL/hr, Last Rate: 30 mL/hr (10/31/22 1730)  sodium chloride, 100 mL/hr, Last Rate: 100 mL/hr (11/01/22 0620)       PRN Medications •  ALPRAZolam  •  diazePAM  •  HYDROmorphone  •  influenza vaccine  •  nitroglycerin  •  ondansetron **OR** ondansetron  •  oxyCODONE  •  traMADol     Physical Findings        Physical Appearance alert, loss of muscle mass, loss of subcutaneous fat, oriented, room air, underweight     NFPE Date Completed: 10/27     Malnutrition Severity Assessment      Patient meets criteria for : Moderate (non-severe) Malnutrition       Edema  no edema   Gastrointestinal last bowel movement: 10/30   Tubes/Drains chest tube   Oral/Mouth Cavity WNL   Skin surgical incisions     Estimated/Assessed Needs       Energy Requirements    Height for Calculation  Height: 152.4 cm (60\")   Weight for Calculation 41.3kg   Method for Estimation  35 kcal/kg   EST Needs (kcal/day) 1445       Protein Requirements    Weight for Calculation 41.3kg   EST Protein Needs (g/kg) 1.2 - 1.5 gm/kg   EST Daily Needs (g/day) 49-62       Fluid Requirements     Method for Estimation 1 mL/kcal    Estimated Needs (mL/day) 1500       Fluid Deficit    Current Na Level (mEq/L)    Desired Na Level (mEq/L)    Estimated Fluid Deficit (L)       "     Current Nutrition Orders & Evaluation of Intake       Oral Nutrition     Food Allergies NKFA   Current PO Diet Diet Regular   Supplement Boost Breeze, TID   PO Evaluation     Trending % PO Intake 50%       --  PES STATEMENT / NUTRITION DIAGNOSIS      Nutrition Dx Problem  Problem: Malnutrition  Etiology: Medical Diagnosis  Signs/Symptoms: Report/Observation  See MSA    Comment:    --  NUTRITION INTERVENTION      Intervention Goal(s) Maintain nutrition status, Improved nutrition related labs, Reduce/improve symptoms, Meet estimated needs, Disease management/therapy, Tolerate PO , Increase intake and Appropriate weight gain         RD Intervention/Action Advise alternative selection, Advise available snack, Encourage intake, Follow Tx Progress, Care plan reviewed and Recommend/ordered:          Prescription/Orders:       PO Diet       Supplements Boost plus with meals      Enteral Nutrition       Parenteral Nutrition    New Prescription Ordered? yes   --      Monitor/Evaluation Per protocol, I&O, PO intake, Supplement intake, Pertinent labs, Weight, Skin status, GI status, Symptoms, Hemodynamic stability   Education Will instruct as appropriate           Education topic:          10/27 Fat, Low fat     Resources given:  Printed materials     Advised regarding: Food choices, Supplement use     Learner:  Patient     Readiness to learn: Accepting     RD contact info provided: Yes     Outpatient referral:        RD to follow per protocol.      Electronically signed by:  Maureen Keyes RD  11/01/22 14:13 EDT

## 2022-11-01 NOTE — PLAN OF CARE
Problem: Adult Inpatient Plan of Care  Goal: Plan of Care Review  Outcome: Ongoing, Progressing  Flowsheets (Taken 11/1/2022 4967)  Progress: improving  Plan of Care Reviewed With: patient  Outcome Evaluation: vss. aaox4 but forgetful at times. up with x1 assist. a-line and de los santos removed this shift, pt able to void spontaneously post de los santos removal. chest tube remains to water seal.   Goal Outcome Evaluation:  Plan of Care Reviewed With: patient        Progress: improving  Outcome Evaluation: vss. aaox4 but forgetful at times. up with x1 assist. a-line and de los santos removed this shift, pt able to void spontaneously post de los santos removal. chest tube remains to water seal.

## 2022-11-01 NOTE — PLAN OF CARE
Problem: Adult Inpatient Plan of Care  Goal: Plan of Care Review  Flowsheets (Taken 11/1/2022 0501)  Progress: improving  Plan of Care Reviewed With: patient  Outcome Evaluation: to floor from pacu chest tube to -20 suction with small amount of serosanquinous drainage no air leak placed to water seal at 0300 medicated for pain with good results valium also given per request with good results pt is forgetful and confused as far as situation had a hard time understanding she was in her same room on [address] [place] [address] cath paten clear yellow urine vital signs stable b/p elevated when patient is in pain or anxious about something otherwise in good range   Goal Outcome Evaluation:  Plan of Care Reviewed With: patient        Progress: improving  Outcome Evaluation: to floor from pacu chest tube to -20 suction with small amount of serosanquinous drainage no air leak placed to water seal at 0300 medicated for pain with good results valium also given per request with good results pt is forgetful and confused as far as situation had a hard time understanding she was in her same room on [address] [place] [address] paten clear yellow urine vital signs stable b/p elevated when patient is in pain or anxious about something otherwise in good range

## 2022-11-02 ENCOUNTER — APPOINTMENT (OUTPATIENT)
Dept: GENERAL RADIOLOGY | Facility: HOSPITAL | Age: 77
End: 2022-11-02

## 2022-11-02 LAB
ANION GAP SERPL CALCULATED.3IONS-SCNC: 12.3 MMOL/L (ref 5–15)
BUN SERPL-MCNC: 18 MG/DL (ref 8–23)
BUN/CREAT SERPL: 26.5 (ref 7–25)
CALCIUM SPEC-SCNC: 7.6 MG/DL (ref 8.6–10.5)
CHLORIDE SERPL-SCNC: 108 MMOL/L (ref 98–107)
CO2 SERPL-SCNC: 17.7 MMOL/L (ref 22–29)
CREAT SERPL-MCNC: 0.68 MG/DL (ref 0.57–1)
DEPRECATED RDW RBC AUTO: 46.7 FL (ref 37–54)
EGFRCR SERPLBLD CKD-EPI 2021: 90.4 ML/MIN/1.73
ERYTHROCYTE [DISTWIDTH] IN BLOOD BY AUTOMATED COUNT: 14.1 % (ref 12.3–15.4)
GLUCOSE SERPL-MCNC: 135 MG/DL (ref 65–99)
HCT VFR BLD AUTO: 34.3 % (ref 34–46.6)
HGB BLD-MCNC: 11.5 G/DL (ref 12–15.9)
MAGNESIUM SERPL-MCNC: 1.7 MG/DL (ref 1.6–2.4)
MCH RBC QN AUTO: 30.6 PG (ref 26.6–33)
MCHC RBC AUTO-ENTMCNC: 33.5 G/DL (ref 31.5–35.7)
MCV RBC AUTO: 91.2 FL (ref 79–97)
PLATELET # BLD AUTO: 342 10*3/MM3 (ref 140–450)
PMV BLD AUTO: 9.7 FL (ref 6–12)
POTASSIUM SERPL-SCNC: 4.2 MMOL/L (ref 3.5–5.2)
RBC # BLD AUTO: 3.76 10*6/MM3 (ref 3.77–5.28)
SODIUM SERPL-SCNC: 138 MMOL/L (ref 136–145)
WBC NRBC COR # BLD: 11.02 10*3/MM3 (ref 3.4–10.8)

## 2022-11-02 PROCEDURE — 99222 1ST HOSP IP/OBS MODERATE 55: CPT | Performed by: STUDENT IN AN ORGANIZED HEALTH CARE EDUCATION/TRAINING PROGRAM

## 2022-11-02 PROCEDURE — 25010000002 ENOXAPARIN PER 10 MG: Performed by: SURGERY

## 2022-11-02 PROCEDURE — 94664 DEMO&/EVAL PT USE INHALER: CPT

## 2022-11-02 PROCEDURE — 25010000002 OCTREOTIDE PER 25 MCG: Performed by: SURGERY

## 2022-11-02 PROCEDURE — 94799 UNLISTED PULMONARY SVC/PX: CPT

## 2022-11-02 PROCEDURE — 83735 ASSAY OF MAGNESIUM: CPT | Performed by: SURGERY

## 2022-11-02 PROCEDURE — 25010000002 HYDROMORPHONE PER 4 MG: Performed by: NURSE PRACTITIONER

## 2022-11-02 PROCEDURE — 25010000002 ONDANSETRON PER 1 MG: Performed by: SURGERY

## 2022-11-02 PROCEDURE — 99024 POSTOP FOLLOW-UP VISIT: CPT | Performed by: NURSE PRACTITIONER

## 2022-11-02 PROCEDURE — 71045 X-RAY EXAM CHEST 1 VIEW: CPT

## 2022-11-02 PROCEDURE — 85027 COMPLETE CBC AUTOMATED: CPT | Performed by: SURGERY

## 2022-11-02 PROCEDURE — 93005 ELECTROCARDIOGRAM TRACING: CPT | Performed by: SURGERY

## 2022-11-02 PROCEDURE — 80048 BASIC METABOLIC PNL TOTAL CA: CPT | Performed by: SURGERY

## 2022-11-02 PROCEDURE — 94761 N-INVAS EAR/PLS OXIMETRY MLT: CPT

## 2022-11-02 PROCEDURE — 93010 ELECTROCARDIOGRAM REPORT: CPT | Performed by: INTERNAL MEDICINE

## 2022-11-02 RX ORDER — DILTIAZEM HCL IN NACL,ISO-OSM 125 MG/125
5-15 PLASTIC BAG, INJECTION (ML) INTRAVENOUS
Status: DISCONTINUED | OUTPATIENT
Start: 2022-11-02 | End: 2022-11-03

## 2022-11-02 RX ORDER — OXYCODONE HYDROCHLORIDE 5 MG/1
2.5 TABLET ORAL EVERY 4 HOURS PRN
Status: DISPENSED | OUTPATIENT
Start: 2022-11-02 | End: 2022-11-05

## 2022-11-02 RX ORDER — METOPROLOL TARTRATE 50 MG/1
50 TABLET, FILM COATED ORAL EVERY 12 HOURS SCHEDULED
Status: DISCONTINUED | OUTPATIENT
Start: 2022-11-02 | End: 2022-11-07 | Stop reason: HOSPADM

## 2022-11-02 RX ORDER — OCTREOTIDE ACETATE 50 UG/ML
50 INJECTION, SOLUTION INTRAVENOUS; SUBCUTANEOUS 3 TIMES DAILY
Status: DISCONTINUED | OUTPATIENT
Start: 2022-11-02 | End: 2022-11-02

## 2022-11-02 RX ORDER — HYDROMORPHONE HYDROCHLORIDE 1 MG/ML
0.1 INJECTION, SOLUTION INTRAMUSCULAR; INTRAVENOUS; SUBCUTANEOUS EVERY 4 HOURS PRN
Status: DISPENSED | OUTPATIENT
Start: 2022-11-02 | End: 2022-11-05

## 2022-11-02 RX ORDER — OCTREOTIDE ACETATE 50 UG/ML
50 INJECTION, SOLUTION INTRAVENOUS; SUBCUTANEOUS 3 TIMES DAILY
Status: DISCONTINUED | OUTPATIENT
Start: 2022-11-02 | End: 2022-11-03

## 2022-11-02 RX ADMIN — ATORVASTATIN CALCIUM 40 MG: 20 TABLET, FILM COATED ORAL at 08:28

## 2022-11-02 RX ADMIN — ESTRADIOL 2 MG: 2 TABLET ORAL at 08:28

## 2022-11-02 RX ADMIN — BUDESONIDE AND FORMOTEROL FUMARATE DIHYDRATE 2 PUFF: 160; 4.5 AEROSOL RESPIRATORY (INHALATION) at 19:43

## 2022-11-02 RX ADMIN — ONDANSETRON 4 MG: 2 INJECTION INTRAMUSCULAR; INTRAVENOUS at 12:56

## 2022-11-02 RX ADMIN — CETIRIZINE HYDROCHLORIDE 10 MG: 10 TABLET ORAL at 08:28

## 2022-11-02 RX ADMIN — FLUTICASONE PROPIONATE 2 SPRAY: 50 SPRAY, METERED NASAL at 08:28

## 2022-11-02 RX ADMIN — ACETAMINOPHEN 1000 MG: 500 TABLET ORAL at 22:13

## 2022-11-02 RX ADMIN — VALSARTAN 160 MG: 160 TABLET, FILM COATED ORAL at 08:28

## 2022-11-02 RX ADMIN — ACETAMINOPHEN 1000 MG: 500 TABLET ORAL at 06:43

## 2022-11-02 RX ADMIN — GABAPENTIN 100 MG: 100 CAPSULE ORAL at 06:43

## 2022-11-02 RX ADMIN — OXYCODONE HYDROCHLORIDE 2.5 MG: 5 TABLET ORAL at 21:19

## 2022-11-02 RX ADMIN — OCTREOTIDE ACETATE 50 MCG: 50 INJECTION, SOLUTION INTRAVENOUS; SUBCUTANEOUS at 16:52

## 2022-11-02 RX ADMIN — METOPROLOL TARTRATE 50 MG: 50 TABLET, FILM COATED ORAL at 12:56

## 2022-11-02 RX ADMIN — HYDROMORPHONE HYDROCHLORIDE 0.1 MG: 1 INJECTION, SOLUTION INTRAMUSCULAR; INTRAVENOUS; SUBCUTANEOUS at 23:04

## 2022-11-02 RX ADMIN — ENOXAPARIN SODIUM 30 MG: 30 INJECTION SUBCUTANEOUS at 21:16

## 2022-11-02 RX ADMIN — VALSARTAN 160 MG: 160 TABLET, FILM COATED ORAL at 21:16

## 2022-11-02 RX ADMIN — ACETAMINOPHEN 1000 MG: 500 TABLET ORAL at 14:51

## 2022-11-02 RX ADMIN — TIOTROPIUM BROMIDE INHALATION SPRAY 1 PUFF: 3.12 SPRAY, METERED RESPIRATORY (INHALATION) at 07:49

## 2022-11-02 RX ADMIN — OCTREOTIDE ACETATE 50 MCG: 50 INJECTION, SOLUTION INTRAVENOUS; SUBCUTANEOUS at 08:28

## 2022-11-02 RX ADMIN — METOPROLOL TARTRATE 5 MG: 5 INJECTION INTRAVENOUS at 00:38

## 2022-11-02 RX ADMIN — GABAPENTIN 100 MG: 100 CAPSULE ORAL at 21:17

## 2022-11-02 RX ADMIN — BUDESONIDE AND FORMOTEROL FUMARATE DIHYDRATE 2 PUFF: 160; 4.5 AEROSOL RESPIRATORY (INHALATION) at 07:48

## 2022-11-02 RX ADMIN — ALPRAZOLAM 0.5 MG: 0.5 TABLET ORAL at 21:16

## 2022-11-02 RX ADMIN — OCTREOTIDE ACETATE 50 MCG: 50 INJECTION, SOLUTION INTRAVENOUS; SUBCUTANEOUS at 21:17

## 2022-11-02 RX ADMIN — GABAPENTIN 100 MG: 100 CAPSULE ORAL at 14:51

## 2022-11-02 NOTE — PLAN OF CARE
Problem: Adult Inpatient Plan of Care  Goal: Plan of Care Review  Outcome: Ongoing, Progressing  Flowsheets (Taken 11/2/2022 1619)  Progress: no change  Plan of Care Reviewed With: patient  Outcome Evaluation: vss. chest tube remains to water seal with milky drainage. up with x1 assistance. pulmonary hygiene and ambulation encouraged.   Goal Outcome Evaluation:  Plan of Care Reviewed With: patient        Progress: no change  Outcome Evaluation: vss. chest tube remains to water seal with milky drainage. up with x1 assistance. pulmonary hygiene and ambulation encouraged.

## 2022-11-02 NOTE — PLAN OF CARE
Problem: Adult Inpatient Plan of Care  Goal: Plan of Care Review  Flowsheets  Taken 11/2/2022 0451 by Karla Heard RN  Plan of Care Reviewed With: patient  Outcome Evaluation: chest tube to water seal milky drainage noted went in and out of rapid afib about every 10 sec starting at 2319 dr clinton notified and also of chest tube drainage back to milky sustaining rapid a fib by 0016 at rates as high as 160's iv lopressor given with immediate response bradied down to 70's had a pause some bigiminal pacs then remained nsr b/p low 100s cardizem not started due to bp low no complaints of pain ambulated to corner of nurses station prior to afib episode voiding  confused oriented to self and place  Taken 11/1/2022 1457 by Neeta Munguia, RN  Progress: improving   Goal Outcome Evaluation:  Plan of Care Reviewed With: patient        Progress: improving  Outcome Evaluation: chest tube to water seal milky drainage noted went in and out of rapid afib about every 10 sec starting at 2319 dr clinton notified and also of chest tube drainage back to milky sustaining rapid a fib by 0016 at rates as high as 160's iv lopressor given with immediate response bradied down to 70's had a pause some bigiminal pacs then remained nsr b/p low 100s cardizem not started due to bp low no complaints of pain ambulated to corner of nurses station prior to afib episode voiding  confused oriented to self and place

## 2022-11-02 NOTE — CONSULTS
Patient Name: Aliya Villaseñor  :1945  77 y.o.    Date of Admission: 10/26/2022  Date of Consultation:  22  Encounter Provider: Bogdan Brandt MD  Place of Service: [facility] CARDIOLOGY  Referring Provider: Matt Wood MD  Patient Care Team:  Kike Yu MD as PCP - General (Family Medicine)  Dayo Pina MD (Inactive) as Consulting Physician (Obstetrics and Gynecology)  Renuka Dunham, RN as Nurse Navigator      Chief complaint: SVT    History of Present Illness: Ms Villaseñor is a 77 year old female with history of hypertension, COPD, right leg melanoma with prior excision () and tobacco abuse, normally followed by Dr. Kammerling with [facility].  She previously worn Holter monitors revealed short bursts of SVT, 30 in total, lasting several beats.  He does not carry diagnosis of atrial fibrillation    She was recently admitted in August with reports of dizziness and orthostatic hypotnesion in the setting of a recent COVID diagnosis. She was seen by Dr. Palmer at that time with an echocardiogram completed demonstrating normal LV and 67%, with normal holter placed at discharge.     She was also incidentally found to have a left upper lobe nodule during recent admission and readmitted to [facility] 10/26/22 to undergo a RUL wedge resection with Dr. Wood. Her postoperative course was complicated by a persistent right-sided thorax with significant chest tube output, ultimately requiring reop 10/31/22 with thoracic duct ligation. She was also noted develop acute tachycardia overnight in the 160's, concerning for new onset AF, for which we have been consulted. She received IV Lopressor and remains in SR this morning.     She currently denies complaints.  Denies palpitations or shortness of breath.  Her main complaint is some discomfort around her chest tube site.  She reports last night she had an episode of confusion,  sounds like delirium, and because of that she was agitated.  I was rounded on the heart was elevated.  She denied any overt chest pain or shortness of breath during that time, but she was anxious.            Previous Cardiac Testing:    Holter 9/2/22  Patient diary was not submitted. No symptoms reported during the monitoring period. No complications noted. The predominant rhythm noted during the testing period was sinus rhythm. Premature atrial contractions occured rarely. There were 30 episodes of supraventricular tachycardia, longest lasting 9 beats and the fastest with a rate of 144 bpm. Premature ventricular contractions occured rarely. Ventricular couplets and trigeminy. There were no episodes of ventricular tachycardia. Sinoatrial node conduction was normal. No atrioventricular block noted.    Echocardiogram 9/2/22  • Saline test results are negative.  • Estimated right ventricular systolic pressure from tricuspid regurgitation is normal (<35 mmHg).  • Estimated left ventricular EF = 67% Left ventricular systolic function is normal.  • Left ventricular diastolic function was normal.      Past Medical History:   Diagnosis Date   • Anxiety    • Cancer (HCC)    • COPD (chronic obstructive pulmonary disease) (HCC) 2020    [facility] COPD   • COVID 08/25/2022    IN HOSP[facility]   • Diverticulitis 2022   • Dizzinesses    • Hypertension    • Melanoma (HCC) 2019    right leg   • Nodule of left lung    • PONV (postoperative nausea and vomiting)    • Stroke (HCC)     AUG 2022       Past Surgical History:   Procedure Laterality Date   • APPENDECTOMY     • COLONOSCOPY     • HYSTERECTOMY     • LAPAROSCOPIC CHOLECYSTECTOMY     • NASAL SEPTUM SURGERY      X2   • SKIN BIOPSY     • THORACOSCOPY Right 10/26/2022    Procedure: THORACOSCOPY WITH DAVINCI ROBOT, RIGHT UPPER LOBE WEDGE RESECTION, MEDIASTINAL LYMPH NODE DISSECTION, FLEXIBLE BRONCHOSCOPY, INTERCOSTAL NERVE BLOCK;  Surgeon: Matt Wood MD;  Location: [facility]  MAIN OR;  Service: Robotics - DaVinci;  Laterality: Right;   • THORACOSCOPY Right 10/31/2022    Procedure: RIGHT VIDEO ASSISTED THORACOSCOPY WITH THORACIC DUCT LIGATION WITH DAVINCI ROBOT;  Surgeon: Matt Wood MD;  Location: [facility] MAIN OR;  Service: Thoracic;  Laterality: Right;   • TONSILLECTOMY           Prior to Admission medications    Medication Sig Start Date End Date Taking? Authorizing Provider   ALPRAZolam (XANAX) 0.5 MG tablet Take 1 tablet by mouth At Night As Needed. 8/11/22  Yes Franny Cuellar MD   atorvastatin (LIPITOR) 40 MG tablet Take 1 tablet by mouth Daily. 9/3/22  Yes Aylin Ortiz APRN   carvedilol (COREG) 25 MG tablet Take 25 mg by mouth 2 (Two) Times a Day With Meals.   Yes Franny Cuellar MD   Chlorhexidine Gluconate 2 % pads Apply  topically. As directed pre op   Yes Franny Cuellar MD   fluticasone (FLONASE) 50 MCG/ACT nasal spray 2 sprays into the nostril(s) as directed by provider Daily. 8/21/22  Yes Franny Cuellar MD   Fluticasone-Umeclidin-Vilant (TRELEGY ELLIPTA IN) Inhale 1 puff Every Morning.   Yes Franny Cuellar MD   loratadine (CLARITIN) 10 MG tablet Take 1 tablet by mouth 2 (Two) Times a Day.   Yes Franny Cuellar MD   valsartan (DIOVAN) 80 MG tablet Take 160 mg by mouth 2 (Two) Times a Day. 4/26/22  Yes Franny Cuellar MD   aspirin (aspirin) 81 MG EC tablet Take 1 tablet by mouth Daily.  Patient taking differently: Take 1 tablet by mouth Daily. PT HOLDING FOR SURGERY 9/2/22   Aylin Ortiz APRN   estradiol (ESTRACE) 2 MG tablet Take 2 mg by mouth Daily. 4/13/22   Franny Cuellar MD   multivitamin (THERAGRAN) tablet tablet Take 1 tablet by mouth Daily. PT HOLDING FOR SURGERY    Franny Cuellar MD       Allergies   Allergen Reactions   • Amlodipine Unknown - Low Severity   • Cephalosporins Unknown - Low Severity   • Ciprofloxacin Unknown - Low Severity   • Erythromycin Unknown - Low Severity     Upset stomach   •  Nefazodone Unknown - Low Severity   • Sulfamethoxazole-Trimethoprim Unknown - Low Severity       Social History     Socioeconomic History   • Marital status:    Tobacco Use   • Smoking status: Former     Years: 20.00     Types: Cigarettes   • Smokeless tobacco: Never   Vaping Use   • Vaping Use: Never used   Substance and Sexual Activity   • Alcohol use: Never   • Drug use: Never   • Sexual activity: Defer       Family History   Problem Relation Age of Onset   • Malig Hyperthermia Neg Hx        REVIEW OF SYSTEMS:   All systems reviewed.  Pertinent positives identified in HPI.  All other systems are negative.      Objective:     Vitals:    11/01/22 2300 11/02/22 0700 11/02/22 0748 11/02/22 1100   BP: 135/94 142/100  110/60   BP Location: Left arm Right arm  Right arm   Patient Position: Lying Lying  Lying   Pulse:   62    Resp: 16 16 16 16   Temp: 98.4 °F (36.9 °C) 97.4 °F (36.3 °C)  97.7 °F (36.5 °C)   TempSrc: Oral Oral  Oral   SpO2:   99%    Weight:       Height:         Body mass index is 17.79 kg/m².    General Appearance:    Alert, cooperative, in no acute distress   Head:    Normocephalic, without obvious abnormality, atraumatic   Eyes:            Lids and lashes normal, conjunctivae and sclerae normal, no icterus, no pallor, corneas clear, PERRLA   Ears:    Ears appear intact with no abnormalities noted   Throat:   No oral lesions, no thrush, oral mucosa moist   Neck:   No adenopathy, supple, trachea midline, no thyromegaly, no carotid bruit, no JVD   Back:     No kyphosis present, no scoliosis present, no skin lesions, erythema or scars, no tenderness to percussion or palpation, range of motion normal   Lungs:     Clear to auscultation, respirations regular, even and unlabored    Heart:    Regular rhythm and normal rate, normal S1 and S2, no murmur, no gallop, no rub, no click   Chest Wall:    No abnormalities observed   Abdomen:     Normal bowel sounds, no masses, no organomegaly, soft, nontender,  nondistended, no guarding, no rebound  tenderness   Extremities:   Moves all extremities well, no edema, no cyanosis, no redness   Pulses:   Pulses palpable and equal bilaterally. Normal radial, carotid, femoral, dorsalis pedis and posterior tibial pulses bilaterally. Normal abdominal aorta   Skin:  Psychiatric:   No bleeding, bruising or rash    Alert and oriented x 3, normal mood and affect   Lab Review:     Results from last 7 days   Lab Units 11/02/22  0956 10/31/22  1050 10/30/22  0552   SODIUM mmol/L 138   < > 139   POTASSIUM mmol/L 4.2   < > 4.3   CHLORIDE mmol/L 108*   < > 110*   CO2 mmol/L 17.7*   < > 18.9*   BUN mg/dL 18   < > 48*   CREATININE mg/dL 0.68   < > 0.97   CALCIUM mg/dL 7.6*   < > 7.9*   BILIRUBIN mg/dL  --   --  0.4   ALK PHOS U/L  --   --  139*   ALT (SGPT) U/L  --   --  46*   AST (SGOT) U/L  --   --  34*   GLUCOSE mg/dL 135*   < > 97    < > = values in this interval not displayed.         Results from last 7 days   Lab Units 11/02/22  0956   WBC 10*3/mm3 11.02*   HEMOGLOBIN g/dL 11.5*   HEMATOCRIT % 34.3   PLATELETS 10*3/mm3 342     Results from last 7 days   Lab Units 10/31/22  1051   INR  1.08   APTT seconds 26.5     Results from last 7 days   Lab Units 11/02/22  0956   MAGNESIUM mg/dL 1.7                   EKG 11/2/22    Previous EKG 9/1/22        I personally viewed and interpreted the patient's EKG/Telemetry data.  First EKG reviewed notable for sinus rhythm with a short run of supraventricular tachycardia.  It is not consistent with atrial fibrillation.    I reviewed her telemetry, there were several, short, bursts of supraventricular tachycardia with rates in the 160s.  These were short-lived.  I did not see any evidence of atrial fibrillation.      Assessment and Plan:       Patient Active Problem List   Diagnosis   • Dizziness   • CVA (cerebral vascular accident) (HCC)   • Lung nodule   • Nodule of upper lobe of left lung   • Moderate malnutrition (HCC)     1.  SVT  - Unclear  etiology, but not consistent with atrial fibrillation.  Telemetry reviewed.  - She has had prior Holter's that revealed bursts of SVT likely similar to this.  - Her carvedilol has been held for the past 3 days.  This is the culprit.  Would resume beta-blocker  - She takes carvedilol at home for hypertension, however she is normotensive here.  I be hesitant to resume due to possible hypotension.  I will start metoprolol 50 twice daily to suppress SVT.  Should she become hypertensive can then change back to her home dose carvedilol.  - I have not seen evidence of atrial fibrillation to require anticoagulation.    S/P: Thoracoscopy with da Henry robot, right upper lobe wedge resection, mediastinal lymph node dissection, flexible bronchoscopy, intercostal nerve block.  POD #: 7   S/P: Right video-assisted thoracoscopy with thoracic duct ligation with da Henry robot.  POD #: 2  - Management per thoracic surgery    Thank you for the consult, cardiology will sign off at this time.  Would resume metoprolol as above.  Please reach out to cardiology with any questions or concerns    Bogdan Brandt MD  11/02/22  11:41 EDT

## 2022-11-02 NOTE — PROGRESS NOTES
"  POST-OPERATIVE NOTE     Chief Complaint: Right upper lobe nodule  S/P: Thoracoscopy with da Henry robot, right upper lobe wedge resection, mediastinal lymph node dissection, flexible bronchoscopy, intercostal nerve block.  POD #: 7  S/P: Right video-assisted thoracoscopy with thoracic duct ligation with da Henry robot.  POD #: 2    Subjective:  Symptoms:  Stable.  She reports chest pain and weakness.  No shortness of breath.    Diet:  Adequate intake.  No nausea or vomiting.    Activity level: Impaired due to weakness.    Pain:  She complains of pain that is mild.  Pain is well controlled.    Up to chair.  Feeling a little discouraged with prolonged hospitalization.    Objective:  General Appearance:  Comfortable, in no acute distress and in pain.    Vital signs: (most recent): Blood pressure 110/60, pulse 62, temperature 97.7 °F (36.5 °C), temperature source Oral, resp. rate 16, height 152.4 cm (60\"), weight 41.3 kg (91 lb 1.6 oz), SpO2 99 %.  No fever.    Output: Producing urine and producing stool.    Lungs:  Normal effort and normal respiratory rate.  She is not in respiratory distress.  There are decreased breath sounds.  No rales, wheezes or rhonchi.    Heart: Normal rate.  Regular rhythm.    Chest: Symmetric chest wall expansion. Chest wall tenderness present.    Abdomen: Abdomen is soft.  Bowel sounds are normal.     Neurological: Patient is alert and oriented to person, place and time.    Skin:  Warm and dry.            Chest tube:   Site: Right, Clean, Dry, Intact and Securement device intact  Suction: waterseal  Air Leak: Negative  24 Hour Total: 360ml    Results Review:     I reviewed the patient's new clinical results.  I reviewed the patient's new imaging results and agree with the interpretation.  Discussed with Patient, nurse, and Dr. Rivas    Assessment & Plan      Overnight events noted.  This morning's chest x-ray was reviewed which demonstrates slight increase of the right apical pleural " separation. She remains on room air and denies any significant postoperative pain--continue analgesic regimen.  Chest tube drainage with milky appearance.  Continue chest tube to waterseal and repeat a chest x-ray tomorrow morning.    Chylothorax: Resume low-fat diet with MCT oil supplementation.  IV octreotide has been reinitiated.  Continue to trend volume of chest tube drainage.    SVT: Coreg has been discontinued.  Metoprolol 50 mg twice daily ordered by cardiology.  Appreciate their assistance.      DVT prophylaxis: Subcutaneous Lovenox     Encourage good pulmonary hygiene including use of spirometer, coughing, deep breathing.  Increase activity as able including scheduled walks around nurse station.    Megan Villeda DNP, APRN  [facility]  11/02/22  13:57 EDT    Patient was seen and assessed while wearing personal protective equipment including facemask, protective eyewear and gloves.  Hand hygiene performed prior to entering the room and upon exiting with doffing of gloves.

## 2022-11-02 NOTE — NURSING NOTE
Voided a small amt of urine prior to going to bed apx 50 cc wanted to get up again right after lopressor given instructed to wait for a bit to let b/p stablize after rapid heart rate and lopressor then patient fell asleep. Got patient out of bed this am to void voided 50 cc and bladder scan says 0 fluids provided to drink encouraged po and report to 7-3 .

## 2022-11-03 ENCOUNTER — APPOINTMENT (OUTPATIENT)
Dept: GENERAL RADIOLOGY | Facility: HOSPITAL | Age: 77
End: 2022-11-03

## 2022-11-03 LAB
ALBUMIN SERPL-MCNC: 1.8 G/DL (ref 3.5–5.2)
ANION GAP SERPL CALCULATED.3IONS-SCNC: 5.3 MMOL/L (ref 5–15)
BASOPHILS # BLD AUTO: 0.03 10*3/MM3 (ref 0–0.2)
BASOPHILS NFR BLD AUTO: 0.4 % (ref 0–1.5)
BUN SERPL-MCNC: 19 MG/DL (ref 8–23)
BUN/CREAT SERPL: 32.8 (ref 7–25)
CALCIUM SPEC-SCNC: 7.7 MG/DL (ref 8.6–10.5)
CHLORIDE SERPL-SCNC: 111 MMOL/L (ref 98–107)
CO2 SERPL-SCNC: 22.7 MMOL/L (ref 22–29)
CREAT SERPL-MCNC: 0.58 MG/DL (ref 0.57–1)
DEPRECATED RDW RBC AUTO: 45.8 FL (ref 37–54)
EGFRCR SERPLBLD CKD-EPI 2021: 93.9 ML/MIN/1.73
EOSINOPHIL # BLD AUTO: 0.22 10*3/MM3 (ref 0–0.4)
EOSINOPHIL NFR BLD AUTO: 2.7 % (ref 0.3–6.2)
ERYTHROCYTE [DISTWIDTH] IN BLOOD BY AUTOMATED COUNT: 13.7 % (ref 12.3–15.4)
GLUCOSE SERPL-MCNC: 98 MG/DL (ref 65–99)
HCT VFR BLD AUTO: 31.3 % (ref 34–46.6)
HGB BLD-MCNC: 10.6 G/DL (ref 12–15.9)
IMM GRANULOCYTES # BLD AUTO: 0.1 10*3/MM3 (ref 0–0.05)
IMM GRANULOCYTES NFR BLD AUTO: 1.2 % (ref 0–0.5)
LYMPHOCYTES # BLD AUTO: 1.32 10*3/MM3 (ref 0.7–3.1)
LYMPHOCYTES NFR BLD AUTO: 16.1 % (ref 19.6–45.3)
MAGNESIUM SERPL-MCNC: 1.8 MG/DL (ref 1.6–2.4)
MCH RBC QN AUTO: 30.7 PG (ref 26.6–33)
MCHC RBC AUTO-ENTMCNC: 33.9 G/DL (ref 31.5–35.7)
MCV RBC AUTO: 90.7 FL (ref 79–97)
MONOCYTES # BLD AUTO: 0.73 10*3/MM3 (ref 0.1–0.9)
MONOCYTES NFR BLD AUTO: 8.9 % (ref 5–12)
NEUTROPHILS NFR BLD AUTO: 5.8 10*3/MM3 (ref 1.7–7)
NEUTROPHILS NFR BLD AUTO: 70.7 % (ref 42.7–76)
NRBC BLD AUTO-RTO: 0 /100 WBC (ref 0–0.2)
PHOSPHATE SERPL-MCNC: 2.9 MG/DL (ref 2.5–4.5)
PLATELET # BLD AUTO: 342 10*3/MM3 (ref 140–450)
PMV BLD AUTO: 9.3 FL (ref 6–12)
POTASSIUM SERPL-SCNC: 4 MMOL/L (ref 3.5–5.2)
QT INTERVAL: 329 MS
RBC # BLD AUTO: 3.45 10*6/MM3 (ref 3.77–5.28)
SODIUM SERPL-SCNC: 139 MMOL/L (ref 136–145)
WBC NRBC COR # BLD: 8.2 10*3/MM3 (ref 3.4–10.8)

## 2022-11-03 PROCEDURE — 94760 N-INVAS EAR/PLS OXIMETRY 1: CPT

## 2022-11-03 PROCEDURE — 99024 POSTOP FOLLOW-UP VISIT: CPT | Performed by: NURSE PRACTITIONER

## 2022-11-03 PROCEDURE — 94664 DEMO&/EVAL PT USE INHALER: CPT

## 2022-11-03 PROCEDURE — 25010000002 OCTREOTIDE PER 25 MCG: Performed by: NURSE PRACTITIONER

## 2022-11-03 PROCEDURE — 80069 RENAL FUNCTION PANEL: CPT

## 2022-11-03 PROCEDURE — 85025 COMPLETE CBC W/AUTO DIFF WBC: CPT

## 2022-11-03 PROCEDURE — 94799 UNLISTED PULMONARY SVC/PX: CPT

## 2022-11-03 PROCEDURE — 71045 X-RAY EXAM CHEST 1 VIEW: CPT

## 2022-11-03 PROCEDURE — 94761 N-INVAS EAR/PLS OXIMETRY MLT: CPT

## 2022-11-03 PROCEDURE — 83735 ASSAY OF MAGNESIUM: CPT

## 2022-11-03 PROCEDURE — 25010000002 ENOXAPARIN PER 10 MG: Performed by: SURGERY

## 2022-11-03 PROCEDURE — 25010000002 OCTREOTIDE PER 25 MCG: Performed by: SURGERY

## 2022-11-03 RX ORDER — OCTREOTIDE ACETATE 500 UG/ML
100 INJECTION, SOLUTION INTRAVENOUS; SUBCUTANEOUS 3 TIMES DAILY
Status: DISCONTINUED | OUTPATIENT
Start: 2022-11-03 | End: 2022-11-03

## 2022-11-03 RX ORDER — OCTREOTIDE ACETATE 100 UG/ML
100 INJECTION, SOLUTION INTRAVENOUS; SUBCUTANEOUS 3 TIMES DAILY
Status: DISCONTINUED | OUTPATIENT
Start: 2022-11-03 | End: 2022-11-07 | Stop reason: HOSPADM

## 2022-11-03 RX ORDER — OCTREOTIDE ACETATE 50 UG/ML
100 INJECTION, SOLUTION INTRAVENOUS; SUBCUTANEOUS 3 TIMES DAILY
Status: DISCONTINUED | OUTPATIENT
Start: 2022-11-03 | End: 2022-11-03

## 2022-11-03 RX ADMIN — GABAPENTIN 100 MG: 100 CAPSULE ORAL at 13:41

## 2022-11-03 RX ADMIN — BUDESONIDE AND FORMOTEROL FUMARATE DIHYDRATE 2 PUFF: 160; 4.5 AEROSOL RESPIRATORY (INHALATION) at 20:17

## 2022-11-03 RX ADMIN — CETIRIZINE HYDROCHLORIDE 10 MG: 10 TABLET ORAL at 08:20

## 2022-11-03 RX ADMIN — ENOXAPARIN SODIUM 30 MG: 30 INJECTION SUBCUTANEOUS at 08:20

## 2022-11-03 RX ADMIN — METOPROLOL TARTRATE 50 MG: 50 TABLET, FILM COATED ORAL at 21:20

## 2022-11-03 RX ADMIN — OCTREOTIDE ACETATE 100 MCG: 500 INJECTION, SOLUTION INTRAVENOUS; SUBCUTANEOUS at 15:49

## 2022-11-03 RX ADMIN — METOPROLOL TARTRATE 50 MG: 50 TABLET, FILM COATED ORAL at 08:20

## 2022-11-03 RX ADMIN — ACETAMINOPHEN 1000 MG: 500 TABLET ORAL at 05:53

## 2022-11-03 RX ADMIN — GABAPENTIN 100 MG: 100 CAPSULE ORAL at 05:53

## 2022-11-03 RX ADMIN — BUDESONIDE AND FORMOTEROL FUMARATE DIHYDRATE 2 PUFF: 160; 4.5 AEROSOL RESPIRATORY (INHALATION) at 07:52

## 2022-11-03 RX ADMIN — ALPRAZOLAM 0.5 MG: 0.5 TABLET ORAL at 23:58

## 2022-11-03 RX ADMIN — OCTREOTIDE ACETATE 50 MCG: 50 INJECTION, SOLUTION INTRAVENOUS; SUBCUTANEOUS at 08:20

## 2022-11-03 RX ADMIN — OCTREOTIDE ACETATE 100 MCG: 100 INJECTION, SOLUTION INTRAVENOUS; SUBCUTANEOUS at 21:21

## 2022-11-03 RX ADMIN — ACETAMINOPHEN 1000 MG: 500 TABLET ORAL at 21:19

## 2022-11-03 RX ADMIN — ATORVASTATIN CALCIUM 40 MG: 20 TABLET, FILM COATED ORAL at 08:20

## 2022-11-03 RX ADMIN — ACETAMINOPHEN 1000 MG: 500 TABLET ORAL at 13:41

## 2022-11-03 RX ADMIN — TIOTROPIUM BROMIDE INHALATION SPRAY 1 PUFF: 3.12 SPRAY, METERED RESPIRATORY (INHALATION) at 07:53

## 2022-11-03 RX ADMIN — SCOPALAMINE 1 PATCH: 1 PATCH, EXTENDED RELEASE TRANSDERMAL at 21:20

## 2022-11-03 RX ADMIN — OXYCODONE HYDROCHLORIDE 2.5 MG: 5 TABLET ORAL at 21:19

## 2022-11-03 RX ADMIN — VALSARTAN 160 MG: 160 TABLET, FILM COATED ORAL at 08:20

## 2022-11-03 RX ADMIN — GABAPENTIN 100 MG: 100 CAPSULE ORAL at 21:20

## 2022-11-03 RX ADMIN — FLUTICASONE PROPIONATE 2 SPRAY: 50 SPRAY, METERED NASAL at 08:20

## 2022-11-03 RX ADMIN — VALSARTAN 160 MG: 160 TABLET, FILM COATED ORAL at 21:20

## 2022-11-03 RX ADMIN — ESTRADIOL 2 MG: 2 TABLET ORAL at 08:20

## 2022-11-03 NOTE — PLAN OF CARE
Goal Outcome Evaluation:      VSS ex/ intermittent sinus jamie. RA. R chest tube to waterseal, no subQ air, milky pink-tinged drainage, dressing CDI. PRN pain meds Moni x1 & Dilaudid x1, Xanax x1. Held metoprolol due to low HR. Up to bathroom w/ assistance.

## 2022-11-03 NOTE — PROGRESS NOTES
"  POST-OPERATIVE NOTE     Chief Complaint: Right upper lobe nodule  S/P: Thoracoscopy with da Henry robot, right upper lobe wedge resection, mediastinal lymph node dissection, flexible bronchoscopy, intercostal nerve block.  POD #: 8  S/P: Right video-assisted thoracoscopy with thoracic duct ligation with da Henry robot.  POD #: 3    Subjective:  Symptoms:  Stable.  She reports chest pain and weakness.  No shortness of breath.    Diet:  Poor intake.  No nausea or vomiting.    Activity level: Impaired due to weakness.    Pain:  She complains of pain that is mild.  Pain is well controlled.        Objective:  General Appearance:  Comfortable, in no acute distress and in pain.    Vital signs: (most recent): Blood pressure 152/92, pulse 62, temperature 97.9 °F (36.6 °C), temperature source Oral, resp. rate 16, height 152.4 cm (60\"), weight 41.3 kg (91 lb 1.6 oz), SpO2 96 %.  No fever.    Output: Producing urine and producing stool.    Lungs:  Normal effort and normal respiratory rate.  She is not in respiratory distress.  There are decreased breath sounds.  No rales, wheezes or rhonchi.    Heart: Normal rate.  Regular rhythm.    Chest: Symmetric chest wall expansion. Chest wall tenderness present.    Abdomen: Abdomen is soft.  Bowel sounds are normal.     Neurological: Patient is alert and oriented to person, place and time.    Skin:  Warm and dry.            Chest tube:   Site: Right, Clean, Dry, Intact and Securement device intact  Suction: waterseal  Air Leak: Negative  24 Hour Total: 340ml    Results Review:     I reviewed the patient's new clinical results.  I reviewed the patient's new imaging results and agree with the interpretation.  Discussed with Patient, nurse, and Dr. Rivas    Assessment & Plan      The patient is less discouraged regarding her prolonged hospitalization today.  She denies any significant postoperative pain.This morning's chest x-ray was reviewed demonstrating stable positioning of the right " chest tube.  The amount of right apical pleural separation appears improved compared to yesterday's imaging.  There is sufficient lung expansion bilaterally.     Chylothorax: Thin milky fluid continues to drain from her chest tube.  Output volume down-trending. Continue chest tube to waterseal and repeat a chest x-ray in the morning.  She remains on a low-fat & high-protein diet.  Serum albumin continues to downtrend. We have asked the registered dietitian for recommendations on protein supplementation.  Continue subcutaneous octreotide.      SVT: Cardiology recommendations noted.  Continue Lopressor 50 mg twice daily.  She remains rate controlled.      DVT prophylaxis: Subcutaneous Lovenox     Encourage good pulmonary hygiene including use of spirometer, coughing, deep breathing.  Increase activity as able including scheduled walks around nurse station.    ABE Rolon  [facility]  11/03/22  15:25 EDT    Patient was seen and assessed while wearing personal protective equipment including facemask, protective eyewear and gloves.  Hand hygiene performed prior to entering the room and upon exiting with doffing of gloves.

## 2022-11-03 NOTE — PLAN OF CARE
Problem: Adult Inpatient Plan of Care  Goal: Plan of Care Review  Outcome: Ongoing, Progressing  Flowsheets (Taken 11/3/2022 3514)  Progress: no change  Plan of Care Reviewed With: patient   Goal Outcome Evaluation:  Plan of Care Reviewed With: patient        Progress: no change       Pt has been up in the chair most of the day. No complaints of pain at this time. Pt still has a right side chest tube to water-seal. Chest tube is putting out a cream pink tinted drainage. Chest tube dressing is clean, dry, and intact. VSS. Discharge TBD

## 2022-11-04 ENCOUNTER — APPOINTMENT (OUTPATIENT)
Dept: GENERAL RADIOLOGY | Facility: HOSPITAL | Age: 77
End: 2022-11-04

## 2022-11-04 LAB — CHYLO FLD QL: ABNORMAL

## 2022-11-04 PROCEDURE — 94799 UNLISTED PULMONARY SVC/PX: CPT

## 2022-11-04 PROCEDURE — 25010000002 HYDROMORPHONE PER 4 MG: Performed by: SURGERY

## 2022-11-04 PROCEDURE — 97162 PT EVAL MOD COMPLEX 30 MIN: CPT

## 2022-11-04 PROCEDURE — 94761 N-INVAS EAR/PLS OXIMETRY MLT: CPT

## 2022-11-04 PROCEDURE — 97110 THERAPEUTIC EXERCISES: CPT

## 2022-11-04 PROCEDURE — 99024 POSTOP FOLLOW-UP VISIT: CPT | Performed by: NURSE PRACTITIONER

## 2022-11-04 PROCEDURE — 71045 X-RAY EXAM CHEST 1 VIEW: CPT

## 2022-11-04 PROCEDURE — 25010000002 OCTREOTIDE PER 25 MCG: Performed by: NURSE PRACTITIONER

## 2022-11-04 PROCEDURE — 25010000002 ENOXAPARIN PER 10 MG: Performed by: SURGERY

## 2022-11-04 PROCEDURE — 94664 DEMO&/EVAL PT USE INHALER: CPT

## 2022-11-04 RX ADMIN — OXYCODONE HYDROCHLORIDE 2.5 MG: 5 TABLET ORAL at 21:24

## 2022-11-04 RX ADMIN — OCTREOTIDE ACETATE 100 MCG: 100 INJECTION, SOLUTION INTRAVENOUS; SUBCUTANEOUS at 08:08

## 2022-11-04 RX ADMIN — GABAPENTIN 100 MG: 100 CAPSULE ORAL at 08:44

## 2022-11-04 RX ADMIN — METOPROLOL TARTRATE 50 MG: 50 TABLET, FILM COATED ORAL at 08:07

## 2022-11-04 RX ADMIN — GABAPENTIN 100 MG: 100 CAPSULE ORAL at 15:17

## 2022-11-04 RX ADMIN — HYDROMORPHONE HYDROCHLORIDE 0.1 MG: 1 INJECTION, SOLUTION INTRAMUSCULAR; INTRAVENOUS; SUBCUTANEOUS at 08:06

## 2022-11-04 RX ADMIN — ALPRAZOLAM 0.5 MG: 0.5 TABLET ORAL at 23:24

## 2022-11-04 RX ADMIN — METOPROLOL TARTRATE 50 MG: 50 TABLET, FILM COATED ORAL at 21:24

## 2022-11-04 RX ADMIN — ATORVASTATIN CALCIUM 40 MG: 20 TABLET, FILM COATED ORAL at 08:07

## 2022-11-04 RX ADMIN — CETIRIZINE HYDROCHLORIDE 10 MG: 10 TABLET ORAL at 08:07

## 2022-11-04 RX ADMIN — HYDROMORPHONE HYDROCHLORIDE 0.1 MG: 1 INJECTION, SOLUTION INTRAMUSCULAR; INTRAVENOUS; SUBCUTANEOUS at 03:31

## 2022-11-04 RX ADMIN — ACETAMINOPHEN 1000 MG: 500 TABLET ORAL at 08:46

## 2022-11-04 RX ADMIN — GABAPENTIN 100 MG: 100 CAPSULE ORAL at 21:24

## 2022-11-04 RX ADMIN — ACETAMINOPHEN 1000 MG: 500 TABLET ORAL at 15:17

## 2022-11-04 RX ADMIN — VALSARTAN 160 MG: 160 TABLET, FILM COATED ORAL at 21:24

## 2022-11-04 RX ADMIN — ENOXAPARIN SODIUM 30 MG: 30 INJECTION SUBCUTANEOUS at 08:07

## 2022-11-04 RX ADMIN — TIOTROPIUM BROMIDE INHALATION SPRAY 1 PUFF: 3.12 SPRAY, METERED RESPIRATORY (INHALATION) at 08:27

## 2022-11-04 RX ADMIN — OCTREOTIDE ACETATE 100 MCG: 100 INJECTION, SOLUTION INTRAVENOUS; SUBCUTANEOUS at 21:24

## 2022-11-04 RX ADMIN — BUDESONIDE AND FORMOTEROL FUMARATE DIHYDRATE 2 PUFF: 160; 4.5 AEROSOL RESPIRATORY (INHALATION) at 20:10

## 2022-11-04 RX ADMIN — ACETAMINOPHEN 1000 MG: 500 TABLET ORAL at 21:25

## 2022-11-04 RX ADMIN — BUDESONIDE AND FORMOTEROL FUMARATE DIHYDRATE 2 PUFF: 160; 4.5 AEROSOL RESPIRATORY (INHALATION) at 08:27

## 2022-11-04 RX ADMIN — ESTRADIOL 2 MG: 2 TABLET ORAL at 08:07

## 2022-11-04 RX ADMIN — VALSARTAN 160 MG: 160 TABLET, FILM COATED ORAL at 08:07

## 2022-11-04 RX ADMIN — FLUTICASONE PROPIONATE 2 SPRAY: 50 SPRAY, METERED NASAL at 08:07

## 2022-11-04 RX ADMIN — OCTREOTIDE ACETATE 100 MCG: 100 INJECTION, SOLUTION INTRAVENOUS; SUBCUTANEOUS at 17:22

## 2022-11-04 NOTE — PROGRESS NOTES
"  POST-OPERATIVE NOTE     Chief Complaint: Right upper lobe nodule  S/P: Thoracoscopy with da Henry robot, right upper lobe wedge resection, mediastinal lymph node dissection, flexible bronchoscopy, intercostal nerve block.  POD #: 9  S/P: Right video-assisted thoracoscopy with thoracic duct ligation with da Henry robot.  POD #: 4    Subjective:  Symptoms:  Stable.  She reports chest pain, weakness and anxiety.  No shortness of breath.    Diet:  Poor intake.  No nausea or vomiting.    Activity level: Impaired due to weakness.    Pain:  She complains of pain that is mild.  Pain is well controlled.      Objective:  General Appearance:  Comfortable, in no acute distress and in pain.    Vital signs: (most recent): Blood pressure 149/64, pulse 62, temperature 97.5 °F (36.4 °C), temperature source Oral, resp. rate 20, height 152.4 cm (60\"), weight 41.3 kg (91 lb 1.6 oz), SpO2 97 %.  No fever.    Output: Producing urine and producing stool.    Lungs:  Normal effort and normal respiratory rate.  She is not in respiratory distress.  There are decreased breath sounds.  No rales, wheezes or rhonchi.    Heart: Normal rate.  Regular rhythm.    Chest: Symmetric chest wall expansion. Chest wall tenderness present.    Abdomen: Abdomen is soft.  Bowel sounds are normal.     Neurological: Patient is alert and oriented to person, place and time.    Skin:  Warm and dry.            Chest tube:   Site: Right, Clean, Dry, Intact and Securement device intact  Suction: waterseal  Air Leak: Negative  24 Hour Total: 220ml    Results Review:     I reviewed the patient's new clinical results.  I reviewed the patient's new imaging results and agree with the interpretation.  Discussed with Patient, nurse, and Dr. Wood    Assessment & Plan      Her chest tube drainage volume is improved. This morning's chest x-ray remains primarily unchanged compared to yesterday's imaging. The chest tube is in satisfactory position. She is tolerating a low-fat, " high-protein diet. Continue intravenous octreotide. Continue chest tube to water seal and trend drainage volume daily. If her chest tube drainage continues to improve, her chest tube may be removed while she is hospitalized though there is a chance she may go home with a mini 500 and for this to be managed as an outpatient basis. This was discussed with the patient.  SVT episodes earlier this week. she remains normotensive and rate controlled.  Continue Lopressor twice daily.  Repeat chest x-ray in the morning.     DVT prophylaxis: Subcutaneous Lovenox     Encourage good pulmonary hygiene including use of spirometer, coughing, deep breathing.  Increase activity as able including scheduled walks around nurse station.    ABE Rolon  [facility]  11/04/22  16:32 EDT    Patient was seen and assessed while wearing personal protective equipment including facemask, protective eyewear and gloves.  Hand hygiene performed prior to entering the room and upon exiting with doffing of gloves.

## 2022-11-04 NOTE — PLAN OF CARE
Goal Outcome Evaluation:  Plan of Care Reviewed With: patient        Progress: improving  Outcome Evaluation: Pt seen for PT manjeetal today. She is POD8 R upper lobe wedge resection and POD3 R VAT. Pt still with R chest tube. She currently presents w expected post op pain and weakness. She is moving fairly well despite pain. Pt up in chair upon entry to room. She was able to stand w SBA using Rwx. She then ambulated approx 200 ft w Rwx and CGA. No unsteadiness notd. Pt returned to chair at end of session. Family present in room. Encouraged pt to continue ambulating w nsg and family. Discussed w RN. PT will continue to follow several times a week to ensure safety and progression with activity.

## 2022-11-04 NOTE — THERAPY EVALUATION
Patient Name: Aliya Villaseñor  : 1945    MRN: [id number]                              Today's Date: 2022       Admit Date: 10/26/2022    Visit Dx:     ICD-10-CM ICD-9-CM   1. Nodule of upper lobe of left lung  R91.1 793.11   2. Lung nodule  R91.1 793.11     Patient Active Problem List   Diagnosis   • Dizziness   • CVA (cerebral vascular accident) (HCC)   • Lung nodule   • Nodule of upper lobe of left lung   • Moderate malnutrition (HCC)     Past Medical History:   Diagnosis Date   • Anxiety    • Cancer (HCC)    • COPD (chronic obstructive pulmonary disease) (HCC)     [facility] COPD   • COVID 2022    [facility]   • Diverticulitis    • Dizzinesses    • Hypertension    • Melanoma (HCC)     right leg   • Nodule of left lung    • PONV (postoperative nausea and vomiting)    • Stroke (HCC)     AUG 2022     Past Surgical History:   Procedure Laterality Date   • APPENDECTOMY     • COLONOSCOPY     • HYSTERECTOMY     • LAPAROSCOPIC CHOLECYSTECTOMY     • NASAL SEPTUM SURGERY      X2   • SKIN BIOPSY     • THORACOSCOPY Right 10/26/2022    Procedure: THORACOSCOPY WITH DAVINCI ROBOT, RIGHT UPPER LOBE WEDGE RESECTION, MEDIASTINAL LYMPH NODE DISSECTION, FLEXIBLE BRONCHOSCOPY, INTERCOSTAL NERVE BLOCK;  Surgeon: Matt Wood MD;  Location: [facility];  Service: Robotics - DaVinci;  Laterality: Right;   • THORACOSCOPY Right 10/31/2022    Procedure: RIGHT VIDEO ASSISTED THORACOSCOPY WITH THORACIC DUCT LIGATION WITH DAVINCI ROBOT;  Surgeon: Matt Wood MD;  Location: [facility];  Service: Thoracic;  Laterality: Right;   • TONSILLECTOMY        General Information     Row Name 22 1226          Physical Therapy Time and Intention    Document Type evaluation  -EJ     Mode of Treatment physical therapy  -EJ     Row Name 22 1226          General Information    Patient Profile Reviewed yes  -EJ     Prior Level of Function independent:;ADL's;all household mobility;community mobility   -EJ     Existing Precautions/Restrictions fall  chest tube  -EJ     Barriers to Rehab none identified  -EJ     Row Name 11/04/22 1226          Living Environment    People in Home child(rudy), adult  -EJ     Row Name 11/04/22 1226          Safety Issues, Functional Mobility    Impairments Affecting Function (Mobility) pain;strength  -EJ           User Key  (r) = Recorded By, (t) = Taken By, (c) = Cosigned By    Initials Name Provider Type    EJ Avani Guo, PT Physical Therapist               Mobility     Row Name 11/04/22 1228          Bed Mobility    Comment, (Bed Mobility) up in chair  -EJ     Row Name 11/04/22 1228          Sit-Stand Transfer    Sit-Stand [place] (Transfers) standby assist  -EJ     Assistive Device (Sit-Stand Transfers) walker, front-wheeled  -EJ     Row Name 11/04/22 1228          Gait/Stairs (Locomotion)    [place] Level (Gait) contact guard  -EJ     Assistive Device (Gait) walker, front-wheeled  -EJ     Distance in Feet (Gait) 200  -EJ     Deviations/Abnormal Patterns (Gait) bettye decreased  -EJ     Comment, (Gait/Stairs) no unsteadiness noted  -EJ           User Key  (r) = Recorded By, (t) = Taken By, (c) = Cosigned By    Initials Name Provider Type    EJ Avani Guo, PT Physical Therapist               Obj/Interventions     Row Name 11/04/22 1228          Range of Motion Comprehensive    General Range of Motion no range of motion deficits identified  -EJ     Row Name 11/04/22 1228          Strength Comprehensive (MMT)    Comment, General Manual Muscle Testing (MMT) Assessment generalized weakness  -EJ           User Key  (r) = Recorded By, (t) = Taken By, (c) = Cosigned By    Initials Name Provider Type    EJ Avani Guo, PT Physical Therapist               Goals/Plan     Row Name 11/04/22 1234          Transfer Goal 1 (PT)    Activity/Assistive Device (Transfer Goal 1, PT) transfers, all;walker, rolling  -EJ     [place] Level/Cues Needed (Transfer Goal  1, PT) supervision required  -EJ     Time Frame (Transfer Goal 1, PT) 1 week  -EJ     Row Name 11/04/22 1234          Gait Training Goal 1 (PT)    Activity/Assistive Device (Gait Training Goal 1, PT) gait (walking locomotion);walker, rolling  -EJ     [place] Level (Gait Training Goal 1, PT) standby assist  -EJ     Distance (Gait Training Goal 1, PT) 300  -EJ     Time Frame (Gait Training Goal 1, PT) 1 week  -EJ     Row Name 11/04/22 1234          Therapy Assessment/Plan (PT)    Planned Therapy Interventions (PT) balance training;gait training;home exercise program;patient/family education;strengthening;stair training;ROM (range of motion);transfer training  -EJ           User Key  (r) = Recorded By, (t) = Taken By, (c) = Cosigned By    Initials Name Provider Type    EJ Avani Guo, PT Physical Therapist               Clinical Impression     Row Name 11/04/22 1228          Pain    Pretreatment Pain Rating 6/10  -EJ     Posttreatment Pain Rating 6/10  -EJ     Pain Location - Side/Orientation Right  -EJ     Pain Location - flank  -EJ     Pre/Posttreatment Pain Comment R chest tube  -EJ     Row Name 11/04/22 1228          Plan of Care Review    Plan of Care Reviewed With patient  -EJ     Progress improving  -EJ     Outcome Evaluation Pt seen for PT eval today. She is POD8 R upper lobe wedge resection and POD3 R VAT. Pt still with R chest tube. She currently presents w expected post op pain and weakness. She is moving fairly well despite pain. Pt up in chair upon entry to room. She was able to stand w SBA using Rwx. She then ambulated approx 200 ft w Rwx and CGA. No unsteadiness notd. Pt returned to chair at end of session. Family present in room. Encouraged pt to continue ambulating w nsg and family. Discussed w RN. PT will continue to follow several times a week to ensure safety and progression with activity.  -EJ     Row Name 11/04/22 1223          Therapy Assessment/Plan (PT)    Rehab Potential (PT) good,  to achieve stated therapy goals  -     Criteria for Skilled Interventions Met (PT) yes  -EJ     Therapy Frequency (PT) 3 times/wk  -     Row Name 11/04/22 1228          Positioning and Restraints    Pre-Treatment Position sitting in chair/recliner  -EJ     Post Treatment Position chair  -EJ     In Chair notified nsg;sitting;call light within reach;encouraged to call for assist;exit alarm on;with family/caregiver  -           User Key  (r) = Recorded By, (t) = Taken By, (c) = Cosigned By    Initials Name Provider Type    Avani Ramirez, PT Physical Therapist               Outcome Measures     Row Name 11/04/22 1236          How much help from another person do you currently need...    Turning from your back to your side while in flat bed without using bedrails? 3  -EJ     Moving from lying on back to sitting on the side of a flat bed without bedrails? 3  -EJ     Moving to and from a bed to a chair (including a wheelchair)? 3  -EJ     Standing up from a chair using your arms (e.g., wheelchair, bedside chair)? 3  -EJ     Climbing 3-5 steps with a railing? 3  -EJ     To walk in hospital room? 3  -EJ     AM-PAC 6 Clicks Score (PT) 18  -EJ     Highest level of mobility 6 --> Walked 10 steps or more  -     Row Name 11/04/22 1236          Functional Assessment    Outcome Measure Options AM-PAC 6 Clicks Basic Mobility (PT)  -           User Key  (r) = Recorded By, (t) = Taken By, (c) = Cosigned By    Initials Name Provider Type    Avani Ramirez, PT Physical Therapist                             Physical Therapy Education     Title: PT OT SLP Therapies (In Progress)     Topic: Physical Therapy (In Progress)     Point: Mobility training (Done)     Learning Progress Summary           Patient Acceptance, E,TB,D, VU by HOSEA at 11/4/2022 1236                   Point: Home exercise program (Not Started)     Learner Progress:  Not documented in this visit.          Point: Body mechanics (Not Started)      Learner Progress:  Not documented in this visit.          Point: Precautions (Not Started)     Learner Progress:  Not documented in this visit.                      User Key     Initials Effective Dates Name Provider Type Discipline     06/16/21 -  Avani Guo, PT Physical Therapist PT              PT Recommendation and Plan  Planned Therapy Interventions (PT): balance training, gait training, home exercise program, patient/family education, strengthening, stair training, ROM (range of motion), transfer training  Plan of Care Reviewed With: patient  Progress: improving  Outcome Evaluation: Pt seen for PT eval today. She is POD8 R upper lobe wedge resection and POD3 R VAT. Pt still with R chest tube. She currently presents w expected post op pain and weakness. She is moving fairly well despite pain. Pt up in chair upon entry to room. She was able to stand w SBA using Rwx. She then ambulated approx 200 ft w Rwx and CGA. No unsteadiness notd. Pt returned to chair at end of session. Family present in room. Encouraged pt to continue ambulating w nsg and family. Discussed w RN. PT will continue to follow several times a week to ensure safety and progression with activity.     Time Calculation:    PT Charges     Row Name 11/04/22 1237             Time Calculation    Start Time 1200  -EJ      Stop Time 1218  -EJ      Time Calculation (min) 18 min  -EJ      PT Received On 11/04/22  -EJ      PT - Next Appointment 11/07/22  -EJ      PT Goal Re-Cert Due Date 11/11/22  -         Time Calculation- PT    Total Timed Code Minutes- PT 14 minute(s)  -EJ            User Key  (r) = Recorded By, (t) = Taken By, (c) = Cosigned By    Initials Name Provider Type     Avani Guo, PT Physical Therapist              Therapy Charges for Today     Code Description Service Date Service Provider Modifiers Qty    62351480996 HC PT EVAL MOD COMPLEXITY 3 11/4/2022 Avani Guo, PT GP 1    95283357063 HC PT THER PROC EA 15  MIN 11/4/2022 Avani Guo, PT GP 1          PT G-Codes  Outcome Measure Options: AM-PAC 6 Clicks Basic Mobility (PT)  AM-PAC 6 Clicks Score (PT): 18    Avani Guo, PT  11/4/2022

## 2022-11-04 NOTE — CASE MANAGEMENT/SOCIAL WORK
Continued Stay Note  [facility]     Patient Name: Aliya Villaseñor  MRN: [id number]  Today's Date: 11/4/2022    Admit Date: 10/26/2022    Plan: Home with HH   Discharge Plan     Row Name 11/04/22 1233       Plan    Plan Home with HH    Patient/Family in Agreement with Plan yes    Plan Comments Met with pt and daughter at bedside who state plan is for pt to go to friends home at discharge.  Daughter to call CCP with friend's address.  May have mini 500 at discharge, if so will need HH.  Referral to [facility].  Pt will need walker at discharge.  Referral to [facility].  No further needs identified at this time.  CCP will follow.  SUMAN Fried RN               Discharge Codes    No documentation.               Expected Discharge Date and Time     Expected Discharge Date Expected Discharge Time    Nov 7, 2022             Jessy Fried, RN

## 2022-11-04 NOTE — PROGRESS NOTES
Nutrition Services    Patient Name:  Aliya Villaseñor  YOB: 1945  MRN: [id number]  Admit Date:  10/26/2022      Comment:  Nutrition follow up. Still with chyle leak and back on very low fat diet. Boost plus has been changed to Boost breeze and we added some prosource with meals yesterday.  She hasn't tried it yet, states she is scared it will upset her stomach.  Reassured her to try it mixed in sprite.  Dtr (?)  at bedside also trying to cheer pt up.  Discussed appropriate foods she can bring in for pt to eat.     Will cont to follow and assist pt with nutrition concerns.    CLINICAL NUTRITION ASSESSMENT      Reason for Assessment BMI, Follow-up Protocol      Diagnosis/Problem   Dx. Lung lesion, S/P Thoracoscopy with da Henry robot, right upper lobe wedge resection, mediastinal lymph node dissection, flexible bronchoscopy, intercostal nerve block.   Medical/Surgical History Past Medical History:   Diagnosis Date   • Anxiety    • Cancer (HCC)    • COPD (chronic obstructive pulmonary disease) (HCC) 2020    [facility] COPD   • COVID 08/25/2022    IN [facility] [place]   • Diverticulitis 2022   • Dizzinesses    • Hypertension    • Melanoma (HCC) 2019    right leg   • Nodule of left lung    • PONV (postoperative nausea and vomiting)    • Stroke (HCC)     AUG 2022       Past Surgical History:   Procedure Laterality Date   • APPENDECTOMY     • COLONOSCOPY     • HYSTERECTOMY     • LAPAROSCOPIC CHOLECYSTECTOMY     • NASAL SEPTUM SURGERY      X2   • SKIN BIOPSY     • THORACOSCOPY Right 10/26/2022    Procedure: THORACOSCOPY WITH DAVINCI ROBOT, RIGHT UPPER LOBE WEDGE RESECTION, MEDIASTINAL LYMPH NODE DISSECTION, FLEXIBLE BRONCHOSCOPY, INTERCOSTAL NERVE BLOCK;  Surgeon: Matt Wood MD;  Location: [facility];  Service: Robotics - DaVinci;  Laterality: Right;   • THORACOSCOPY Right 10/31/2022    Procedure: RIGHT VIDEO ASSISTED THORACOSCOPY WITH THORACIC DUCT LIGATION WITH DAVINCI ROBOT;  Surgeon: Matt Wood  "MD;  Location: [facility] OR;  Service: Thoracic;  Laterality: Right;   • TONSILLECTOMY          Encounter Information        Nutrition/Diet History:  Drinks the boost breeze   Food Preferences:    Supplements:    Factors Affecting Intake: dislikes hospital food, dislikes modified diet     Anthropometrics        Current Height  Current Weight  BMI kg/m2 Height: 152.4 cm (60\")  Weight: 41.3 kg (91 lb 1.6 oz) (10/26/22 0612)  Body mass index is 17.79 kg/m².       Admission Weight 41.3kg   Ideal Body Weight (IBW) 50.4kg   Usual Body Weight (UBW) 43.4kg (95lb) 10/2021, 44.9kg (99lb) 10/2020   Weight Change/Trend 5% loss in 1 month       Weight History Wt Readings from Last 30 Encounters:   10/26/22 0612 41.3 kg (91 lb 1.6 oz)   10/20/22 0931 41.3 kg (91 lb)   10/06/22 0919 40.8 kg (90 lb)   10/06/22 0835 41.1 kg (90 lb 9.6 oz)   09/22/22 0839 40.1 kg (88 lb 4.8 oz)   09/22/22 0806 40.1 kg (88 lb 6.4 oz)   09/02/22 1023 42.2 kg (93 lb)   09/01/22 1329 42.5 kg (93 lb 9.6 oz)   09/01/22 1504 42.2 kg (93 lb)             Tests/Procedures        Tests/Procedures X-Ray     Labs       Pertinent Labs    Results from last 7 days   Lab Units 11/03/22  0507 11/02/22  0956 11/01/22  0510 10/31/22  1050 10/30/22  0552 10/29/22  1146   SODIUM mmol/L 139 138 142   < > 139 136   POTASSIUM mmol/L 4.0 4.2 4.3   < > 4.3 3.8   CHLORIDE mmol/L 111* 108* 114*   < > 110* 101   CO2 mmol/L 22.7 17.7* 19.0*   < > 18.9* 22.9   BUN mg/dL 19 18 20   < > 48* 49*   CREATININE mg/dL 0.58 0.68 0.55*   < > 0.97 1.56*   CALCIUM mg/dL 7.7* 7.6* 7.6*   < > 7.9* 8.1*   BILIRUBIN mg/dL  --   --   --   --  0.4 0.4   ALK PHOS U/L  --   --   --   --  139* 139*   ALT (SGPT) U/L  --   --   --   --  46* 55*   AST (SGOT) U/L  --   --   --   --  34* 39*   GLUCOSE mg/dL 98 135* 101*   < > 97 127*    < > = values in this interval not displayed.     Results from last 7 days   Lab Units 11/03/22  0507 11/02/22  0956   MAGNESIUM mg/dL 1.8 1.7   PHOSPHORUS mg/dL 2.9  --  " "  HEMOGLOBIN g/dL 10.6* 11.5*   HEMATOCRIT % 31.3* 34.3   WBC 10*3/mm3 8.20 11.02*   ALBUMIN g/dL 1.80*  --      Results from last 7 days   Lab Units 11/03/22  0507 11/02/22  0956 11/01/22  0510 10/31/22  1051 10/31/22  1050 10/30/22  0552   INR   --   --   --  1.08  --   --    APTT seconds  --   --   --  26.5  --   --    PLATELETS 10*3/mm3 342 342 323  --  321 297     COVID19   Date Value Ref Range Status   09/01/2022 Detected (C) Not Detected - Ref. Range Final     Lab Results   Component Value Date    HGBA1C 6.00 (H) 09/01/2022          Medications           Scheduled Medications acetaminophen, 1,000 mg, Oral, Q8H  atorvastatin, 40 mg, Oral, Daily  budesonide-formoterol, 2 puff, Inhalation, BID - RT   And  tiotropium bromide monohydrate, 1 puff, Inhalation, Daily - RT  cetirizine, 10 mg, Oral, Daily  enoxaparin, 30 mg, Subcutaneous, Nightly  estradiol, 2 mg, Oral, Daily  fluticasone, 2 spray, Nasal, Daily  gabapentin, 100 mg, Oral, Q8H  metoprolol tartrate, 50 mg, Oral, Q12H  octreotide, 100 mcg, Intravenous, TID  Scopolamine, 1 patch, Transdermal, Q72H  valsartan, 160 mg, Oral, BID       Infusions     PRN Medications •  ALPRAZolam  •  diazePAM  •  HYDROmorphone  •  influenza vaccine  •  nitroglycerin  •  ondansetron **OR** ondansetron  •  oxyCODONE  •  traMADol     Physical Findings        Physical Appearance alert, loss of muscle mass, loss of subcutaneous fat, oriented, room air, underweight     NFPE Date Completed: 10/27     Malnutrition Severity Assessment      Patient meets criteria for : Moderate (non-severe) Malnutrition       Edema  no edema   Gastrointestinal last bowel movement: 10/30   Tubes/Drains chest tube   Oral/Mouth Cavity WNL   Skin surgical incisions     Estimated/Assessed Needs       Energy Requirements    Height for Calculation  Height: 152.4 cm (60\")   Weight for Calculation 41.3kg   Method for Estimation  35 kcal/kg   EST Needs (kcal/day) 1445       Protein Requirements    Weight for " Calculation 41.3kg   EST Protein Needs (g/kg) 1.2 - 1.5 gm/kg   EST Daily Needs (g/day) 49-62       Fluid Requirements     Method for Estimation 1 mL/kcal    Estimated Needs (mL/day) 1500       Fluid Deficit    Current Na Level (mEq/L)    Desired Na Level (mEq/L)    Estimated Fluid Deficit (L)           Current Nutrition Orders & Evaluation of Intake       Oral Nutrition     Food Allergies NKFA   Current PO Diet Diet Regular; Low Fat; Chyle Leak; High Protein   Supplement Boost Breeze, TID, prosource with meals   PO Evaluation     Trending % PO Intake 25-75%       --  PES STATEMENT / NUTRITION DIAGNOSIS      Nutrition Dx Problem  Problem: Malnutrition  Etiology: Medical Diagnosis  Signs/Symptoms: Report/Observation  See MSA    Comment:    --  NUTRITION INTERVENTION      Intervention Goal(s) Maintain nutrition status, Improved nutrition related labs, Reduce/improve symptoms, Meet estimated needs, Disease management/therapy, Tolerate PO , Increase intake and Appropriate weight gain         RD Intervention/Action Advise alternative selection, Advise available snack, Encourage intake, Follow Tx Progress, Care plan reviewed and Recommend/ordered:          Prescription/Orders:       PO Diet       Supplements Boost breeze and prosource with meals      Enteral Nutrition       Parenteral Nutrition    New Prescription Ordered? yes   --      Monitor/Evaluation Per protocol, I&O, PO intake, Supplement intake, Pertinent labs, Weight, Skin status, GI status, Symptoms, Hemodynamic stability   Education Will instruct as appropriate           Education topic:          10/27 Fat, Low fat     Resources given:  Printed materials     Advised regarding: Food choices, Supplement use     Learner:  Patient     Readiness to learn: Accepting     RD contact info provided: Yes     Outpatient referral:        RD to follow per protocol.      Electronically signed by:  Maureen Keyes RD  11/04/22 14:43 EDT

## 2022-11-04 NOTE — DISCHARGE PLACEMENT REQUEST
"Jyoti Vasquez (77 y.o. Female)     Date of Birth   1945    Social Security Number       Address   [address] [place] [address]    Home Phone   [phone]    MRN   [id number]       Lutheran   Unknown    Marital Status                               Admission Date   10/26/22    Admission Type   Elective    Admitting Provider   Matt Wood MD    Attending Provider   Matt Wood MD    Department, Room/Bed   [address], [address]       Discharge Date       Discharge Disposition       Discharge Destination                               Attending Provider: Matt Wood MD    Allergies: Amlodipine, Cephalosporins, Ciprofloxacin, Erythromycin, Nefazodone, Sulfamethoxazole-trimethoprim    Isolation: None   Infection: COVID (History) (10/25/22)   Code Status: CPR    Ht: 152.4 cm (60\")   Wt: 41.3 kg (91 lb 1.6 oz)    Admission Cmt: None   Principal Problem: Lung nodule [R91.1]                 Active Insurance as of 10/26/2022     Primary Coverage     Payor Plan Insurance Group Employer/Plan Group    HUMANA MEDICARE REPLACEMENT HUMANA MEDICARE REPLACEMENT [id number]     Payor Plan Address Payor Plan Phone Number Payor Plan Fax Number Effective Dates    [address] [address] [phone]  1/1/2022 - None Entered    [facility] [address]       Subscriber Name Subscriber Birth Date Member ID       JYOTI VASQUEZ 1945 [id number]                 Emergency Contacts      (Rel.) Home Phone Work Phone Mobile Phone    Елена Malik (Daughter) [phone] -- [phone]          "

## 2022-11-04 NOTE — PLAN OF CARE
Problem: Adult Inpatient Plan of Care  Goal: Plan of Care Review  Flowsheets (Taken 11/4/2022 040)  Progress: no change  Plan of Care Reviewed With: patient  Outcome Evaluation: chest tube continued to water seal with milky drainage noted pain pill given x1 with some relief but later stated was miserable and hurting all over dilaudid given with good results voiding per bedside commode with assistance up dressing changed x1 saturated with serous drainage.   Goal Outcome Evaluation:  Plan of Care Reviewed With: patient        Progress: no change  Outcome Evaluation: chest tube continued to water seal with milky drainage noted pain pill given x1 with some relief but later stated was miserable and hurting all over dilaudid given with good results voiding per bedside commode with assistance up dressing changed x1 saturated with serous drainage.

## 2022-11-04 NOTE — PLAN OF CARE
Problem: Adult Inpatient Plan of Care  Goal: Plan of Care Review  Outcome: Ongoing, Progressing  Flowsheets (Taken 11/4/2022 1522)  Progress: improving  Plan of Care Reviewed With: patient   Goal Outcome Evaluation:  Plan of Care Reviewed With: patient        Progress: improving       Pt has walked around the nurses station a few times today. She sat up in the chair for a few hours. Pt chest tube is still draining a pink milky drainage. Chest tube dressing is clean, dry, and intact. Dressing was changed twice. Pain controlled with PRN pain medications. VSS

## 2022-11-05 ENCOUNTER — APPOINTMENT (OUTPATIENT)
Dept: GENERAL RADIOLOGY | Facility: HOSPITAL | Age: 77
End: 2022-11-05

## 2022-11-05 PROCEDURE — 25010000002 OCTREOTIDE PER 25 MCG: Performed by: NURSE PRACTITIONER

## 2022-11-05 PROCEDURE — 94664 DEMO&/EVAL PT USE INHALER: CPT

## 2022-11-05 PROCEDURE — 25010000002 ENOXAPARIN PER 10 MG: Performed by: SURGERY

## 2022-11-05 PROCEDURE — 71045 X-RAY EXAM CHEST 1 VIEW: CPT

## 2022-11-05 PROCEDURE — 99024 POSTOP FOLLOW-UP VISIT: CPT | Performed by: NURSE PRACTITIONER

## 2022-11-05 PROCEDURE — 94761 N-INVAS EAR/PLS OXIMETRY MLT: CPT

## 2022-11-05 PROCEDURE — 94760 N-INVAS EAR/PLS OXIMETRY 1: CPT

## 2022-11-05 PROCEDURE — 94799 UNLISTED PULMONARY SVC/PX: CPT

## 2022-11-05 RX ORDER — OXYCODONE HYDROCHLORIDE 5 MG/1
2.5 TABLET ORAL EVERY 4 HOURS PRN
Status: DISPENSED | OUTPATIENT
Start: 2022-11-05 | End: 2022-11-07

## 2022-11-05 RX ORDER — HYDROMORPHONE HYDROCHLORIDE 1 MG/ML
0.1 INJECTION, SOLUTION INTRAMUSCULAR; INTRAVENOUS; SUBCUTANEOUS EVERY 4 HOURS PRN
Status: ACTIVE | OUTPATIENT
Start: 2022-11-05 | End: 2022-11-07

## 2022-11-05 RX ADMIN — METOPROLOL TARTRATE 50 MG: 50 TABLET, FILM COATED ORAL at 10:24

## 2022-11-05 RX ADMIN — BUDESONIDE AND FORMOTEROL FUMARATE DIHYDRATE 2 PUFF: 160; 4.5 AEROSOL RESPIRATORY (INHALATION) at 08:37

## 2022-11-05 RX ADMIN — ENOXAPARIN SODIUM 30 MG: 30 INJECTION SUBCUTANEOUS at 10:25

## 2022-11-05 RX ADMIN — OCTREOTIDE ACETATE 100 MCG: 100 INJECTION, SOLUTION INTRAVENOUS; SUBCUTANEOUS at 10:25

## 2022-11-05 RX ADMIN — ACETAMINOPHEN 1000 MG: 500 TABLET ORAL at 22:01

## 2022-11-05 RX ADMIN — ALPRAZOLAM 0.5 MG: 0.5 TABLET ORAL at 22:01

## 2022-11-05 RX ADMIN — OCTREOTIDE ACETATE 100 MCG: 100 INJECTION, SOLUTION INTRAVENOUS; SUBCUTANEOUS at 17:00

## 2022-11-05 RX ADMIN — TIOTROPIUM BROMIDE INHALATION SPRAY 1 PUFF: 3.12 SPRAY, METERED RESPIRATORY (INHALATION) at 08:37

## 2022-11-05 RX ADMIN — BUDESONIDE AND FORMOTEROL FUMARATE DIHYDRATE 2 PUFF: 160; 4.5 AEROSOL RESPIRATORY (INHALATION) at 22:32

## 2022-11-05 RX ADMIN — OCTREOTIDE ACETATE 100 MCG: 100 INJECTION, SOLUTION INTRAVENOUS; SUBCUTANEOUS at 22:03

## 2022-11-05 RX ADMIN — METOPROLOL TARTRATE 50 MG: 50 TABLET, FILM COATED ORAL at 22:01

## 2022-11-05 RX ADMIN — CETIRIZINE HYDROCHLORIDE 10 MG: 10 TABLET ORAL at 10:24

## 2022-11-05 RX ADMIN — GABAPENTIN 100 MG: 100 CAPSULE ORAL at 15:07

## 2022-11-05 RX ADMIN — GABAPENTIN 100 MG: 100 CAPSULE ORAL at 22:01

## 2022-11-05 RX ADMIN — ESTRADIOL 2 MG: 2 TABLET ORAL at 10:23

## 2022-11-05 RX ADMIN — FLUTICASONE PROPIONATE 2 SPRAY: 50 SPRAY, METERED NASAL at 10:23

## 2022-11-05 RX ADMIN — ACETAMINOPHEN 1000 MG: 500 TABLET ORAL at 06:44

## 2022-11-05 RX ADMIN — VALSARTAN 160 MG: 160 TABLET, FILM COATED ORAL at 10:24

## 2022-11-05 RX ADMIN — ACETAMINOPHEN 1000 MG: 500 TABLET ORAL at 15:07

## 2022-11-05 RX ADMIN — ATORVASTATIN CALCIUM 40 MG: 20 TABLET, FILM COATED ORAL at 10:23

## 2022-11-05 RX ADMIN — VALSARTAN 160 MG: 160 TABLET, FILM COATED ORAL at 22:01

## 2022-11-05 RX ADMIN — GABAPENTIN 100 MG: 100 CAPSULE ORAL at 06:44

## 2022-11-05 NOTE — PROGRESS NOTES
"  POST-OPERATIVE NOTE     Chief Complaint: Right upper lobe nodule  S/P: Thoracoscopy with da Henry robot, right upper lobe wedge resection, mediastinal lymph node dissection, flexible bronchoscopy, intercostal nerve block.  POD #: 10  S/P: Right video-assisted thoracoscopy with thoracic duct ligation with da Henry robot.  POD #: 5    Subjective:  Symptoms:  Stable.  She reports chest pain, weakness and anxiety.  No shortness of breath.    Diet:  Poor intake.  No nausea or vomiting.    Activity level: Impaired due to weakness.    Pain:  She complains of pain that is mild.  Pain is well controlled.      Objective:  General Appearance:  Comfortable, in no acute distress and in pain.    Vital signs: (most recent): Blood pressure 162/77, pulse 69, temperature 97.6 °F (36.4 °C), temperature source Oral, resp. rate 16, height 152.4 cm (60\"), weight 41.3 kg (91 lb 1.6 oz), SpO2 96 %.  No fever.    Output: Producing urine and producing stool.    Lungs:  Normal effort and normal respiratory rate.  She is not in respiratory distress.  There are decreased breath sounds.  No rales, wheezes or rhonchi.    Heart: Normal rate.  Regular rhythm.    Chest: Symmetric chest wall expansion. Chest wall tenderness present.    Abdomen: Abdomen is soft.  Bowel sounds are normal.     Neurological: Patient is alert and oriented to person, place and time.    Skin:  Warm and dry.            Chest tube:   Site: Right, Clean, Dry, Intact and Securement device intact  Suction: waterseal  Air Leak: Negative  24 Hour Total: 80ml    Results Review:     I reviewed the patient's new clinical results.  I reviewed the patient's new imaging results and agree with the interpretation.  Discussed with Patient, nurse, and Dr. Wood    Assessment & Plan      Her chest tube drainage volume is improved.  This morning's chest x-ray demonstrates right apical pleural separation.  With similar appearance of minimal pleural effusions.  No new acute pulmonary process.  "     Chest tube output is decreasing.  Appears to be more serous in nature.  She is tolerating a low-fat, high-protein diet. Continue intravenous octreotide.Continue chest tube to water seal and trend drainage volume daily.  If output continues to drop off possible removal of chest tube in the next day or 2 and hopefully discharge home.  Continue home Lopressor.     DVT prophylaxis: Subcutaneous Lovenox     Encourage good pulmonary hygiene including use of spirometer, coughing, deep breathing.  Increase activity as able including scheduled walks around nurse station.    ABE Rolon  [facility]  11/05/22  13:01 EDT    Patient was seen and assessed while wearing personal protective equipment including facemask, protective eyewear and gloves.  Hand hygiene performed prior to entering the room and upon exiting with doffing of gloves.

## 2022-11-05 NOTE — PLAN OF CARE
Problem: Adult Inpatient Plan of Care  Goal: Plan of Care Review  Flowsheets (Taken 11/5/2022 0501)  Progress: improving  Plan of Care Reviewed With: patient  Outcome Evaluation: chest tube to water seal milky drainage noted no air leak pain controlled with pain meds forgetful   Goal Outcome Evaluation:  Plan of Care Reviewed With: patient        Progress: improving  Outcome Evaluation: chest tube to water seal milky drainage noted no air leak pain controlled with pain meds forgetful

## 2022-11-06 ENCOUNTER — APPOINTMENT (OUTPATIENT)
Dept: GENERAL RADIOLOGY | Facility: HOSPITAL | Age: 77
End: 2022-11-06

## 2022-11-06 PROCEDURE — 25010000002 OCTREOTIDE PER 25 MCG: Performed by: NURSE PRACTITIONER

## 2022-11-06 PROCEDURE — 94761 N-INVAS EAR/PLS OXIMETRY MLT: CPT

## 2022-11-06 PROCEDURE — 94760 N-INVAS EAR/PLS OXIMETRY 1: CPT

## 2022-11-06 PROCEDURE — 94799 UNLISTED PULMONARY SVC/PX: CPT

## 2022-11-06 PROCEDURE — 71045 X-RAY EXAM CHEST 1 VIEW: CPT

## 2022-11-06 PROCEDURE — 99024 POSTOP FOLLOW-UP VISIT: CPT | Performed by: NURSE PRACTITIONER

## 2022-11-06 RX ADMIN — ACETAMINOPHEN 1000 MG: 500 TABLET ORAL at 23:24

## 2022-11-06 RX ADMIN — ACETAMINOPHEN 1000 MG: 500 TABLET ORAL at 06:41

## 2022-11-06 RX ADMIN — OCTREOTIDE ACETATE 100 MCG: 100 INJECTION, SOLUTION INTRAVENOUS; SUBCUTANEOUS at 20:48

## 2022-11-06 RX ADMIN — ATORVASTATIN CALCIUM 40 MG: 20 TABLET, FILM COATED ORAL at 09:24

## 2022-11-06 RX ADMIN — ESTRADIOL 2 MG: 2 TABLET ORAL at 09:26

## 2022-11-06 RX ADMIN — TRAMADOL HYDROCHLORIDE 25 MG: 50 TABLET, COATED ORAL at 17:35

## 2022-11-06 RX ADMIN — BUDESONIDE AND FORMOTEROL FUMARATE DIHYDRATE 2 PUFF: 160; 4.5 AEROSOL RESPIRATORY (INHALATION) at 07:33

## 2022-11-06 RX ADMIN — ALPRAZOLAM 0.5 MG: 0.5 TABLET ORAL at 20:56

## 2022-11-06 RX ADMIN — GABAPENTIN 100 MG: 100 CAPSULE ORAL at 06:41

## 2022-11-06 RX ADMIN — BUDESONIDE AND FORMOTEROL FUMARATE DIHYDRATE 2 PUFF: 160; 4.5 AEROSOL RESPIRATORY (INHALATION) at 19:25

## 2022-11-06 RX ADMIN — TRAMADOL HYDROCHLORIDE 25 MG: 50 TABLET, COATED ORAL at 09:32

## 2022-11-06 RX ADMIN — GABAPENTIN 100 MG: 100 CAPSULE ORAL at 20:47

## 2022-11-06 RX ADMIN — VALSARTAN 160 MG: 160 TABLET, FILM COATED ORAL at 09:29

## 2022-11-06 RX ADMIN — CETIRIZINE HYDROCHLORIDE 10 MG: 10 TABLET ORAL at 09:25

## 2022-11-06 RX ADMIN — FLUTICASONE PROPIONATE 2 SPRAY: 50 SPRAY, METERED NASAL at 09:28

## 2022-11-06 RX ADMIN — ACETAMINOPHEN 1000 MG: 500 TABLET ORAL at 16:00

## 2022-11-06 RX ADMIN — OCTREOTIDE ACETATE 100 MCG: 100 INJECTION, SOLUTION INTRAVENOUS; SUBCUTANEOUS at 16:00

## 2022-11-06 RX ADMIN — VALSARTAN 160 MG: 160 TABLET, FILM COATED ORAL at 20:47

## 2022-11-06 RX ADMIN — TIOTROPIUM BROMIDE INHALATION SPRAY 1 PUFF: 3.12 SPRAY, METERED RESPIRATORY (INHALATION) at 07:33

## 2022-11-06 RX ADMIN — GABAPENTIN 100 MG: 100 CAPSULE ORAL at 16:00

## 2022-11-06 RX ADMIN — METOPROLOL TARTRATE 50 MG: 50 TABLET, FILM COATED ORAL at 09:28

## 2022-11-06 RX ADMIN — METOPROLOL TARTRATE 50 MG: 50 TABLET, FILM COATED ORAL at 20:48

## 2022-11-06 RX ADMIN — OXYCODONE HYDROCHLORIDE 2.5 MG: 5 TABLET ORAL at 00:14

## 2022-11-06 NOTE — PROGRESS NOTES
"  POST-OPERATIVE NOTE     Chief Complaint: Right upper lobe nodule  S/P: Thoracoscopy with da Henry robot, right upper lobe wedge resection, mediastinal lymph node dissection, flexible bronchoscopy, intercostal nerve block.  POD #: 11  S/P: Right video-assisted thoracoscopy with thoracic duct ligation with da Henry robot.  POD #: 6    Subjective:  Symptoms:  Stable.  She reports chest pain, weakness and anxiety.  No shortness of breath.    Diet:  Poor intake.  No nausea or vomiting.    Activity level: Impaired due to weakness.    Pain:  She complains of pain that is mild.  Pain is well controlled.      Objective:  General Appearance:  Comfortable, in no acute distress and in pain.    Vital signs: (most recent): Blood pressure 166/78, pulse 65, temperature 97.3 °F (36.3 °C), temperature source Oral, resp. rate 16, height 152.4 cm (60\"), weight 41.3 kg (91 lb 1.6 oz), SpO2 97 %.  No fever.    Output: Producing urine and producing stool.    Lungs:  Normal effort and normal respiratory rate.  She is not in respiratory distress.  There are decreased breath sounds.  No rales, wheezes or rhonchi.    Heart: Normal rate.  Regular rhythm.    Chest: Symmetric chest wall expansion. Chest wall tenderness present.    Abdomen: Abdomen is soft.  Bowel sounds are normal.     Neurological: Patient is alert and oriented to person, place and time.    Skin:  Warm and dry.            Chest tube:   Site: Right, Clean, Dry, Intact and Securement device intact  Suction: waterseal  Air Leak: Negative  24 Hour Total: 110ml    Results Review:     I reviewed the patient's new clinical results.  I reviewed the patient's new imaging results and agree with the interpretation.  Discussed with Patient, nurse, and Dr. Wood    Assessment & Plan      Continues to have small mount of chest tube output.  Drainage appears serous. We will try clamping her chest tube today and possibly remove it tomorrow. Recheck x-ray in AM.    Continue low-fat, " high-protein diet with scheduled octreotide.  Continue home Lopressor.  DVT prophylaxis: Subcutaneous Lovenox     Encourage good pulmonary hygiene including use of spirometer, coughing, deep breathing.  Increase activity as able including scheduled walks around nurse station.    ABE Rolon  [facility]  11/06/22  11:58 EST    Patient was seen and assessed while wearing personal protective equipment including facemask, protective eyewear and gloves.  Hand hygiene performed prior to entering the room and upon exiting with doffing of gloves.

## 2022-11-06 NOTE — PLAN OF CARE
Goal Outcome Evaluation:  Plan of Care Reviewed With: patient        Progress: no change  Outcome Evaluation: VSS. Alert and oriented x4 with forgetfulness. Up to toilet x1 assist. Right chest tube to water seal without air leak. Continues with milky, yellow-tinged drainage. Right-sided chest pain and soreness treated with scheduled and prn pain meds. Will continue to provide supportive care.

## 2022-11-07 ENCOUNTER — APPOINTMENT (OUTPATIENT)
Dept: GENERAL RADIOLOGY | Facility: HOSPITAL | Age: 77
End: 2022-11-07

## 2022-11-07 ENCOUNTER — TELEPHONE (OUTPATIENT)
Dept: SURGERY | Facility: CLINIC | Age: 77
End: 2022-11-07

## 2022-11-07 ENCOUNTER — HOME HEALTH ADMISSION (OUTPATIENT)
Dept: HOME HEALTH SERVICES | Facility: HOME HEALTHCARE | Age: 77
End: 2022-11-07

## 2022-11-07 ENCOUNTER — READMISSION MANAGEMENT (OUTPATIENT)
Dept: CALL CENTER | Facility: HOSPITAL | Age: 77
End: 2022-11-07

## 2022-11-07 VITALS
BODY MASS INDEX: 17.88 KG/M2 | WEIGHT: 91.1 LBS | HEART RATE: 57 BPM | RESPIRATION RATE: 16 BRPM | OXYGEN SATURATION: 97 % | SYSTOLIC BLOOD PRESSURE: 168 MMHG | HEIGHT: 60 IN | DIASTOLIC BLOOD PRESSURE: 78 MMHG | TEMPERATURE: 97.6 F

## 2022-11-07 PROCEDURE — 94761 N-INVAS EAR/PLS OXIMETRY MLT: CPT

## 2022-11-07 PROCEDURE — 71045 X-RAY EXAM CHEST 1 VIEW: CPT

## 2022-11-07 PROCEDURE — 99024 POSTOP FOLLOW-UP VISIT: CPT | Performed by: NURSE PRACTITIONER

## 2022-11-07 PROCEDURE — 94799 UNLISTED PULMONARY SVC/PX: CPT

## 2022-11-07 PROCEDURE — 97110 THERAPEUTIC EXERCISES: CPT

## 2022-11-07 PROCEDURE — 25010000002 ENOXAPARIN PER 10 MG: Performed by: SURGERY

## 2022-11-07 PROCEDURE — 71046 X-RAY EXAM CHEST 2 VIEWS: CPT

## 2022-11-07 PROCEDURE — 94664 DEMO&/EVAL PT USE INHALER: CPT

## 2022-11-07 PROCEDURE — 25010000002 OCTREOTIDE PER 25 MCG: Performed by: NURSE PRACTITIONER

## 2022-11-07 PROCEDURE — 94760 N-INVAS EAR/PLS OXIMETRY 1: CPT

## 2022-11-07 RX ORDER — TRAMADOL HYDROCHLORIDE 50 MG/1
25 TABLET ORAL EVERY 8 HOURS PRN
Qty: 30 TABLET | Refills: 0 | Status: SHIPPED | OUTPATIENT
Start: 2022-11-07

## 2022-11-07 RX ORDER — GABAPENTIN 100 MG/1
100 CAPSULE ORAL EVERY 8 HOURS SCHEDULED
Qty: 42 CAPSULE | Refills: 0 | Status: SHIPPED | OUTPATIENT
Start: 2022-11-07 | End: 2022-11-21

## 2022-11-07 RX ORDER — ACETAMINOPHEN 500 MG
1000 TABLET ORAL EVERY 8 HOURS
Qty: 42 TABLET | Refills: 0 | Status: SHIPPED | OUTPATIENT
Start: 2022-11-07 | End: 2022-11-21

## 2022-11-07 RX ADMIN — FLUTICASONE PROPIONATE 2 SPRAY: 50 SPRAY, METERED NASAL at 08:34

## 2022-11-07 RX ADMIN — CETIRIZINE HYDROCHLORIDE 10 MG: 10 TABLET ORAL at 08:33

## 2022-11-07 RX ADMIN — TIOTROPIUM BROMIDE INHALATION SPRAY 1 PUFF: 3.12 SPRAY, METERED RESPIRATORY (INHALATION) at 07:06

## 2022-11-07 RX ADMIN — GABAPENTIN 100 MG: 100 CAPSULE ORAL at 06:52

## 2022-11-07 RX ADMIN — GABAPENTIN 100 MG: 100 CAPSULE ORAL at 15:01

## 2022-11-07 RX ADMIN — BUDESONIDE AND FORMOTEROL FUMARATE DIHYDRATE 2 PUFF: 160; 4.5 AEROSOL RESPIRATORY (INHALATION) at 07:05

## 2022-11-07 RX ADMIN — ESTRADIOL 2 MG: 2 TABLET ORAL at 08:34

## 2022-11-07 RX ADMIN — VALSARTAN 160 MG: 160 TABLET, FILM COATED ORAL at 08:33

## 2022-11-07 RX ADMIN — OCTREOTIDE ACETATE 100 MCG: 100 INJECTION, SOLUTION INTRAVENOUS; SUBCUTANEOUS at 08:33

## 2022-11-07 RX ADMIN — METOPROLOL TARTRATE 50 MG: 50 TABLET, FILM COATED ORAL at 08:33

## 2022-11-07 RX ADMIN — ATORVASTATIN CALCIUM 40 MG: 20 TABLET, FILM COATED ORAL at 08:33

## 2022-11-07 RX ADMIN — ACETAMINOPHEN 1000 MG: 500 TABLET ORAL at 06:52

## 2022-11-07 RX ADMIN — ENOXAPARIN SODIUM 30 MG: 30 INJECTION SUBCUTANEOUS at 08:33

## 2022-11-07 RX ADMIN — ACETAMINOPHEN 1000 MG: 500 TABLET ORAL at 15:01

## 2022-11-07 NOTE — DISCHARGE PLACEMENT REQUEST
"Jyoti Vasquez (77 y.o. Female)     Date of Birth   1945    Social Security Number       Address   [address] [place] [address]    Home Phone   [phone]    MRN   [id number]       Amish   Unknown    Marital Status                               Admission Date   10/26/22    Admission Type   Elective    Admitting Provider   Matt Wood MD    Attending Provider   Matt Wood MD    Department, Room/Bed   [address], [address]       Discharge Date       Discharge Disposition       Discharge Destination                               Attending Provider: Matt Wood MD    Allergies: Amlodipine, Cephalosporins, Ciprofloxacin, Erythromycin, Nefazodone, Sulfamethoxazole-trimethoprim    Isolation: None   Infection: COVID (History) (10/25/22)   Code Status: CPR    Ht: 152.4 cm (60\")   Wt: 41.3 kg (91 lb 1.6 oz)    Admission Cmt: None   Principal Problem: Lung nodule [R91.1]                 Active Insurance as of 10/26/2022     Primary Coverage     Payor Plan Insurance Group Employer/Plan Group    HUMANA MEDICARE REPLACEMENT HUMANA MEDICARE REPLACEMENT [id number]     Payor Plan Address Payor Plan Phone Number Payor Plan Fax Number Effective Dates    [address] [address] [phone]  1/1/2022 - None Entered    [facility] [address]       Subscriber Name Subscriber Birth Date Member ID       JYOTI VASQUEZ 1945 [id number]                 Emergency Contacts      (Rel.) Home Phone Work Phone Mobile Phone    Елена Malik (Daughter) [phone] -- [phone]            "

## 2022-11-07 NOTE — PLAN OF CARE
Problem: Adult Inpatient Plan of Care  Goal: Plan of Care Review  Flowsheets (Taken 11/7/2022 1430)  Progress: improving  Plan of Care Reviewed With: patient  Outcome Evaluation: vss, ct removed repeat cxr stable, occlusive dressing replaced, original saturated, pt reluctant taking pain medication, currently on the eras protocol, home with hh, rolling walker delivered by outside facility  Goal: Absence of Hospital-Acquired Illness or Injury  Intervention: Identify and Manage Fall Risk  Recent Flowsheet Documentation  Taken 11/7/2022 1445 by Ruth Ann Verdugo RN  Safety Promotion/Fall Prevention:   safety round/check completed   room organization consistent   nonskid shoes/slippers when out of bed   lighting adjusted   fall prevention program maintained   clutter free environment maintained   activity supervised  Taken 11/7/2022 1239 by Ruth Ann Verdugo RN  Safety Promotion/Fall Prevention:   safety round/check completed   room organization consistent   nonskid shoes/slippers when out of bed   lighting adjusted   fall prevention program maintained   clutter free environment maintained   assistive device/personal items within reach  Taken 11/7/2022 1023 by Ruth Ann Verdugo RN  Safety Promotion/Fall Prevention:   safety round/check completed   room organization consistent   nonskid shoes/slippers when out of bed   lighting adjusted   fall prevention program maintained   clutter free environment maintained   assistive device/personal items within reach  Taken 11/7/2022 0823 by Ruth Ann Verdugo RN  Safety Promotion/Fall Prevention:   safety round/check completed   room organization consistent   nonskid shoes/slippers when out of bed   lighting adjusted   fall prevention program maintained   clutter free environment maintained   assistive device/personal items within reach  Intervention: Prevent and Manage VTE (Venous Thromboembolism) Risk  Recent Flowsheet Documentation  Taken 11/7/2022 1023 by Ruth Ann Verdugo  CLYDE RN  Activity Management:   up in chair   ambulated to bathroom  Taken 11/7/2022 0823 by Ruth Ann Verdugo RN  VTE Prevention/Management:   bilateral   sequential compression devices off  Goal: Optimal Comfort and Wellbeing  Intervention: Monitor Pain and Promote Comfort  Recent Flowsheet Documentation  Taken 11/7/2022 1445 by Ruth Ann Verdugo RN  Pain Management Interventions: medication offered but refused  Taken 11/7/2022 0823 by Ruth Ann Verdugo RN  Pain Management Interventions: medication offered but refused  Intervention: Provide Person-Centered Care  Recent Flowsheet Documentation  Taken 11/7/2022 0823 by Ruth Ann Verdugo RN  Trust Relationship/Rapport:   care explained   thoughts/feelings acknowledged   questions answered   Goal Outcome Evaluation:  Plan of Care Reviewed With: patient        Progress: improving  Outcome Evaluation: vss, ct removed repeat cxr stable, occlusive dressing replaced, original saturated, pt reluctant taking pain medication, currently on the eras protocol, home with hh, rolling walker delivered by outside facility

## 2022-11-07 NOTE — PLAN OF CARE
Problem: Adult Inpatient Plan of Care  Goal: Plan of Care Review  Outcome: Ongoing, Progressing  Flowsheets (Taken 11/7/2022 8331)  Progress: improving  Plan of Care Reviewed With: patient  Outcome Evaluation: r chest tube clamped per order.  medicated for rest per mar. no sob, or distress noted. will cont to monitor   Goal Outcome Evaluation:  Plan of Care Reviewed With: patient        Progress: improving  Outcome Evaluation: r chest tube clamped per order.  medicated for rest per mar. no sob, or distress noted. will cont to monitor

## 2022-11-07 NOTE — PLAN OF CARE
Goal Outcome Evaluation:  Plan of Care Reviewed With: patient           Outcome Evaluation: Pt has shown improvement with mobility and is able to walk with stand by assist, she feels more confident with a rolling walker and is a little steadier and can walk farther with a rwx, Rwx has been ordered for pt for home, pt able to walk 200 ft today, plans home with assist today

## 2022-11-07 NOTE — DISCHARGE SUMMARY
Patient Care Team:  Kike Yu MD as PCP - General (Family Medicine)  Dayo iPna MD (Inactive) as Consulting Physician (Obstetrics and Gynecology)  Renuka Dunham RN as Nurse Navigator    Date of Admission: 10/26/2022   Date of Discharge:  11/7/2022    Discharge Diagnosis: Right upper lobe pulmonary nodule    Presenting Problem  Lung nodule [R91.1]  Nodule of upper lobe of left lung [R91.1]     History of Present Illness  Aliya Villaseñor is a 77 y.o. female who presented to Dr. Wood for an enlarging right upper lobe pulmonary nodule was mildly PET avid.  It was not amenable to needle biopsy.  After further evaluation, Dr. Wood recommended right upper lobe wedge resection and the patient agreed to proceed.    Hospital Course    Ms. Howell was admitted postoperatively on 10/26/2022 status post robot-assisted right upper lobe wedge resection.  For further details, please refer to Dr. Wood's operative note.  She recovered in the postanesthesia care unit was transferred to [facility]. the remainder of her stay.  Unfortunately, she had a prolonged postoperative course and was found to have a chylothorax that did not resolve with conservative measures including low-fat diet, MCT Oil and subcutaneous octreotide.  She had a right VATS with thoracic duct ligation on 10/31/2022.  Chyle leak improved.  Cardiology was consulted on 11/2 due to paroxysmal SVT.  They recommended 50 mg of metoprolol twice daily.  Chest tube output has decreased and chest tube was removed without difficulty earlier today.  Follow-up imaging is stable.  Postoperative care instructions have been discussed with her at length.  She is to remain on low-fat diet for 2 more weeks.  She is unable to tolerate MCT Oil as this causes severe nausea, vomiting and diarrhea.  She will follow-up with Dr. Wood in our office on Thursday with a hospital performed chest x-ray.  Home health has been ordered to assess her postop incisions.  Her pain  is adequately controlled and she is hemodynamically stable on room air.    Procedures Performed  Procedure(s):  RIGHT VIDEO ASSISTED THORACOSCOPY WITH THORACIC DUCT LIGATION WITH DAVINCI ROBOT       Consults:   Consults     Date and Time Order Name Status Description    11/2/2022  5:27 AM Inpatient Cardiology Consult Completed           Pertinent Test Results:     Imaging Results (Last 24 Hours)     Procedure Component Value Units Date/Time    XR Chest 1 View [[id number]] Collected: 11/07/22 0723     Updated: 11/07/22 0727    Narrative:      XR CHEST 1 VW-     Clinical: Follow-up pneumothorax     COMPARISON examination 11/6/2022     FINDINGS: Stable right chest tube position. Right apical pneumothorax  similar to the previous examination. Left-sided pleural effusion  unchanged. Cardiomediastinal silhouette is stable. No acute pulmonary  process has developed.     CONCLUSION: Stable chest     This report was finalized on 11/7/2022 7:24 AM by Dr. Papito Ang M.D.             Lab Results (last 24 hours)     ** No results found for the last 24 hours. **            Condition on Discharge:  stable    Vital Signs  Temp:  [97.6 °F (36.4 °C)-98 °F (36.7 °C)] 97.6 °F (36.4 °C)  Heart Rate:  [53-88] 57  Resp:  [16-20] 16  BP: (144-189)/(68-87) 168/78    Physical Exam:    General Appearance:    Alert, cooperative, in no acute distress   Head:    Normocephalic, without obvious abnormality, atraumatic   Eyes:            Lids and lashes normal, conjunctivae and sclerae normal, no   icterus, no pallor, corneas clear, PERRLA   Ears:    Ears appear intact with no abnormalities noted   Throat:   No oral lesions, no thrush, oral mucosa moist   Neck:   No adenopathy, supple, trachea midline, no thyromegaly, no     carotid bruit, no JVD   Back:     No kyphosis present, no scoliosis present, no skin lesions,       erythema or scars, no tenderness to percussion or                   palpation,   range of motion normal   Lungs:     Clear  to auscultation,respirations regular, even and                   unlabored    Heart:    Regular rhythm and normal rate, normal S1 and S2, no            murmur, no gallop, no rub, no click   Breast Exam:    Deferred   Abdomen:     Normal bowel sounds, no masses, no organomegaly, soft        non-tender, non-distended, no guarding, no rebound                 tenderness   Genitalia:    Deferred   Extremities:   Moves all extremities well, no edema, no cyanosis, no              redness   Pulses:   Pulses palpable and equal bilaterally   Skin:   No bleeding, or rash.  Bruising to right hand.  Postoperative incisions are well approximated with Dermabond intact.  DuoDERM applied to former chest tube site.  No purulence or erythema.   Lymph nodes:   No palpable adenopathy   Neurologic:   Cranial nerves 2 - 12 grossly intact, sensation intact, DTR        present and equal bilaterally       Discharge Disposition  Home today    Discharge Medications     Discharge Medications      New Medications      Instructions Start Date   acetaminophen 500 MG tablet  Commonly known as: TYLENOL   1,000 mg, Oral, Every 8 Hours      gabapentin 100 MG capsule  Commonly known as: NEURONTIN   100 mg, Oral, Every 8 Hours Scheduled      traMADol 50 MG tablet  Commonly known as: ULTRAM   25 mg, Oral, Every 8 Hours PRN         Continue These Medications      Instructions Start Date   ALPRAZolam 0.5 MG tablet  Commonly known as: XANAX   0.5 mg, Oral, Nightly PRN      atorvastatin 40 MG tablet  Commonly known as: LIPITOR   40 mg, Oral, Daily      carvedilol 25 MG tablet  Commonly known as: COREG   25 mg, Oral, 2 Times Daily With Meals      Chlorhexidine Gluconate 2 % pads   Apply externally, As directed pre op      estradiol 2 MG tablet  Commonly known as: ESTRACE   2 mg, Oral, Daily      fluticasone 50 MCG/ACT nasal spray  Commonly known as: FLONASE   2 sprays into the nostril(s) as directed by provider Daily.      loratadine 10 MG tablet  Commonly  known as: CLARITIN   10 mg, Oral, 2 Times Daily      multivitamin tablet tablet   1 tablet, Oral, Daily, PT HOLDING FOR SURGERY      TRELEGY ELLIPTA IN   1 puff, Inhalation, Every Morning      valsartan 80 MG tablet  Commonly known as: DIOVAN   160 mg, Oral, 2 Times Daily         ASK your doctor about these medications      Instructions Start Date   aspirin 81 MG EC tablet   81 mg, Oral, Daily             Discharge Instructions:  · No heavy lifting, pushing, pulling greater than 10 pounds.  · No driving up until 2 weeks after surgery and no longer taking narcotics.  · Resume home diet as tolerated.  · Continue incentive spirometer at least 4 times per day.  · Remove dressing from post chest tube site after 48 hours, may shower and clean surgical sites with antibacterial soap or hydrogen peroxide, and apply gauze dressing or band-aid as needed for any drainage.  No dressing needed once no longer draining.          Follow-up Appointments  Future Appointments   Date Time Provider Department Center   11/10/2022 10:00 AM Matt Wood MD [facility]   11/21/2022  1:00 PM Jane Wilkerson APRN [facility]     Additional Instructions for the Follow-ups that You Need to Schedule     Ambulatory Referral to Home Health (Hospital)   As directed      Please assess postop VATS site on the right.  Chest tube site should remain clean and dry.No Vaseline gauze, creams, ointments or lotions.  Please cleanse with hydrogen peroxide and cover with dry 4 x 4 for drainage, if necessary.  Leave open to air as much as possible.    Order Comments: Please assess postop VATS site on the right.  Chest tube site should remain clean and dry.No Vaseline gauze, creams, ointments or lotions.  Please cleanse with hydrogen peroxide and cover with dry 4 x 4 for drainage, if necessary.  Leave open to air as much as possible.     Face to Face Visit Date: 11/7/2022    Follow-up provider for Plan of Care?: I treated the patient in an acute care  facility and will not continue treatment after discharge.    Follow-up provider: ESCOBAR LIZ [[id number]]    Reason/Clinical Findings: post-op VATS    Describe mobility limitations that make leaving home difficult: generalized weakness    Nursing/Therapeutic Services Requested: Skilled Nursing    Skilled nursing orders: Wound care dressing/changes    Frequency: 1 Week 1               Test Results Pending at Discharge  Pending Labs     Order Current Status    Fungus Culture - Tissue, Lung, Right Upper Lobe Preliminary result          For any questions regarding patient's stay, please refer to patient's chart.    Megan Villeda DNP, APRN  [facility]  11/07/22  12:05 EST        Greater than 30 minutes spent on the unit discharging this patient, with more than 50% of time spent assessing the patient, counseling the patient on postoperative care and discharge planning.

## 2022-11-07 NOTE — PROGRESS NOTES
[facility] will follow post hospital. Patient agreeable to service. Contact information confirmed. Patient will be staying with a friend at [address] [place] [address]. Patient can be reached on her mobile # [phone]. Back up contact # [phone] ( daughter).

## 2022-11-08 ENCOUNTER — NURSE NAVIGATOR (OUTPATIENT)
Dept: OTHER | Facility: HOSPITAL | Age: 77
End: 2022-11-08

## 2022-11-08 NOTE — PROGRESS NOTES
"Called patient on cell phone today (she just discharged from the hospital late yesterday). She states she is not feeling well. The diet they have her on \"is not good\". She is also having diarrhea. She plans on discussing these things with the home health nurse who is coming out tomorrow.     She is currently staying with a friend who is able to help care for her. She doesn't want to talk right now. She agreed that I will call her again on Friday   "

## 2022-11-08 NOTE — CASE MANAGEMENT/SOCIAL WORK
Case Management Discharge Note      Final Note: Discharged home with [facility] and walker provided by [facility]    Provided Post Acute Provider List?: N/A  N/A Provider List Comment: Denies HH/SNF needs.  Provided Post Acute Provider Quality & Resource List?: N/A  N/A Quality & Resource List Comment: Denies HH/SNF needs.    Selected Continued Care - Discharged on 11/7/2022 Admission date: 10/26/2022 - Discharge disposition: Home or Self Care    Destination    No services have been selected for the patient.              Durable Medical Equipment    No services have been selected for the patient.              Dialysis/Infusion    No services have been selected for the patient.              Home Medical Care     Service Provider Selected Services Address Phone Fax Patient Preferred    [facility] [address] [place] [id number] [phone] --          Therapy    No services have been selected for the patient.              Community Resources    No services have been selected for the patient.              Community & DME    No services have been selected for the patient.                  Transportation Services  Private: Car    Final Discharge Disposition Code: 06 - home with home health care

## 2022-11-08 NOTE — OUTREACH NOTE
Prep Survey    Flowsheet Row Responses   [facility] patient discharged from? [facility]   Is LACE score < 7 ? No   Emergency Room discharge w/ pulse ox? No   Eligibility Readm Mgmt   Discharge diagnosis Right upper lobe pulmonary nodule s/p right upper lobe wedge resection and s/p Right video-assisted thoracoscopy with thoracic duct ligation    Does the patient have one of the following disease processes/diagnoses(primary or secondary)? Cardiothoracic surgery   Does the patient have Home health ordered? Yes   What is the Home health agency?  [facility]   Is there a DME ordered? Yes   What DME was ordered? walker- [place]   Prep survey completed? Yes          ISIDRO AYALA - Registered Nurse

## 2022-11-09 ENCOUNTER — HOME CARE VISIT (OUTPATIENT)
Dept: HOME HEALTH SERVICES | Facility: HOME HEALTHCARE | Age: 77
End: 2022-11-09

## 2022-11-09 DIAGNOSIS — R91.1 NODULE OF UPPER LOBE OF LEFT LUNG: Primary | ICD-10-CM

## 2022-11-09 PROCEDURE — G0299 HHS/HOSPICE OF RN EA 15 MIN: HCPCS

## 2022-11-09 RX ORDER — LOPERAMIDE HYDROCHLORIDE 2 MG/1
2 CAPSULE ORAL 3 TIMES DAILY PRN
Status: DISCONTINUED | OUTPATIENT
Start: 2022-11-09 | End: 2022-11-10

## 2022-11-09 RX ORDER — ONDANSETRON 4 MG/1
4 TABLET, ORALLY DISINTEGRATING ORAL EVERY 6 HOURS PRN
Status: DISCONTINUED | OUTPATIENT
Start: 2022-11-09 | End: 2022-11-09

## 2022-11-09 RX ORDER — LOPERAMIDE HYDROCHLORIDE 2 MG/1
2 CAPSULE ORAL 3 TIMES DAILY PRN
Status: DISCONTINUED | OUTPATIENT
Start: 2022-11-09 | End: 2022-11-09

## 2022-11-09 RX ORDER — ONDANSETRON 4 MG/1
4 TABLET, ORALLY DISINTEGRATING ORAL EVERY 6 HOURS PRN
Status: DISCONTINUED | OUTPATIENT
Start: 2022-11-09 | End: 2022-11-10

## 2022-11-09 NOTE — HOME HEALTH
SOC Note: RN to pt home to complete SN SOC. Pt in bedroom when RN arrived. Pt visibly weak but states she is starting to feel somewhat better. Pt states if the nausea and diarrhea would get better she would feel a lot better. Pt states she was told in the hospital that some nausea and diarrhea can be expected. She did inquire if RN could do anything for this. RN did message provider to request medication for nausea and diarrhea per pt request. Pt does have an appt with provider tomorrow. Pt has right hand bruising from IV. RN reviewed medications and allergies with pt. Assessment completed.     Home Health ordered for: [facility]. RN did offer pt PT eval due to increased weakness and pt declined.     Reason for Hosp/Primary Dx/Co-morbidities: RIght upper lobe wedge resection following discovery of pulmonary nodule; Dizziness, CVA, moderate malnutrition    Focus of Care: T/I post op VATS care, medications    Current Functional status/mobility/DME: Pt has a walker/cane but states she is not using it. RN did highly recommend pt use walker or at least cane when ambulating due to inceased weakness. Pt currently is needing help from her friend when ambulating long distances, going to appts, completing some ADL's and iADL's.     HB status/Living Arrangements: Pt currently staying with a friend in a single story home. Pt is currently homebound due to increased weakness, fatigues easily, reports exhaustion, and recent surgery.     Skin Integrity/wound status: Multiple small incisions/puncture sites from chest tubes on right side. Pressure dressing was removed by this RN. No bleeding or drainage noted. Pt instructed that she may shower today adn cleanse with antibacterial soap and pat dry. If area begins to drain keep clean with peroxide and cover with 4x4 bandage, per MD order.     Code Status: DNR     Fall Risk: Yes    Plan for next visit: CP assess, assess nausea, respiratory assessment, check right side from tube  sites, T/I medication

## 2022-11-10 ENCOUNTER — OFFICE VISIT (OUTPATIENT)
Dept: SURGERY | Facility: CLINIC | Age: 77
End: 2022-11-10

## 2022-11-10 ENCOUNTER — HOSPITAL ENCOUNTER (OUTPATIENT)
Dept: GENERAL RADIOLOGY | Facility: HOSPITAL | Age: 77
Discharge: HOME | End: 2022-11-10
Admitting: SURGERY

## 2022-11-10 VITALS
BODY MASS INDEX: 17.28 KG/M2 | HEART RATE: 67 BPM | RESPIRATION RATE: 16 BRPM | SYSTOLIC BLOOD PRESSURE: 138 MMHG | WEIGHT: 88 LBS | HEIGHT: 60 IN | DIASTOLIC BLOOD PRESSURE: 78 MMHG

## 2022-11-10 DIAGNOSIS — R91.1 LUNG NODULE: Primary | ICD-10-CM

## 2022-11-10 DIAGNOSIS — R91.1 LUNG NODULE: ICD-10-CM

## 2022-11-10 DIAGNOSIS — J94.0 CHYLOTHORAX: Primary | ICD-10-CM

## 2022-11-10 PROCEDURE — 71046 X-RAY EXAM CHEST 2 VIEWS: CPT

## 2022-11-10 PROCEDURE — 99024 POSTOP FOLLOW-UP VISIT: CPT | Performed by: SURGERY

## 2022-11-10 RX ORDER — ONDANSETRON 4 MG/1
4 TABLET, ORALLY DISINTEGRATING ORAL EVERY 6 HOURS PRN
Status: SHIPPED | OUTPATIENT
Start: 2022-11-10 | End: 2022-11-19

## 2022-11-10 RX ORDER — LOPERAMIDE HYDROCHLORIDE 2 MG/1
2 CAPSULE ORAL 3 TIMES DAILY PRN
Status: SHIPPED | OUTPATIENT
Start: 2022-11-10 | End: 2022-11-19

## 2022-11-11 ENCOUNTER — NURSE NAVIGATOR (OUTPATIENT)
Dept: OTHER | Facility: HOSPITAL | Age: 77
End: 2022-11-11

## 2022-11-11 ENCOUNTER — READMISSION MANAGEMENT (OUTPATIENT)
Dept: CALL CENTER | Facility: HOSPITAL | Age: 77
End: 2022-11-11

## 2022-11-11 ENCOUNTER — HOME CARE VISIT (OUTPATIENT)
Dept: HOME HEALTH SERVICES | Facility: HOME HEALTHCARE | Age: 77
End: 2022-11-11

## 2022-11-11 PROCEDURE — G0300 HHS/HOSPICE OF LPN EA 15 MIN: HCPCS

## 2022-11-11 NOTE — PROGRESS NOTES
"Call from Aliya. She saw Dr. Wood yesterday, has to stay on \"this diet\" for another week (no carbs, no fat, low calorie diet). She is hungry but she can't eat what she is getting. \"Its making me sick\". Foods are too dry. She sees Dr. Wood again next week. She has 8 incisions, she complains of itching although reports they are healing. She stated her pathology was negative for cancer. Home health came out 2 days ago; they are supposed to come out again today.     She verbalized frustration that she developed complications post surgery. Provided active listening, validated and normalized feelings. Encouraged trying to focus on what is in her control right now and taking things one day at a time.     She denied other issues/concerns. I will call in 1-2 weeks; she agreed and will call me as needed    Acuity 2  "

## 2022-11-11 NOTE — OUTREACH NOTE
CT Surgery Week 1 Survey    Flowsheet Row Responses   [facility] patient discharged from? [facility]   Does the patient have one of the following disease processes/diagnoses(primary or secondary)? Cardiothoracic surgery   Week 1 attempt successful? Yes   Call start time 1433   Call end time 1439   Person spoke with today (if not patient) and relationship Елена-daughter.   Meds reviewed with patient/caregiver? Yes   Is the patient having any side effects they believe may be caused by any medication additions or changes? No   Does the patient have all medications related to this admission filled (includes all antibiotics, pain medications, cardiac medications, etc.) Yes   Is the patient taking all medications as directed (includes completed medication regime)? Yes   Medication comments Zofran and immodium.   Comments regarding appointments Has kept appt with her surgeon.   Does the patient have a primary care provider?  Yes   Does the patient have an appointment scheduled with their C/T surgeon? Yes   Has the patient kept scheduled appointments due by today? Yes   Has home health visited the patient within 72 hours of discharge? Yes   Has all DME been delivered? Yes   Psychosocial issues? No   Did the patient receive a copy of their discharge instructions? Yes   Nursing interventions Reviewed instructions with patient   What is the patient's perception of their health status since discharge? Same   Nursing interventions Nurse provided patient education   Is the patient/caregiver able to teach back normal signs of recovery? Nausea and lack of appetite  [Daughter states surgeon aware.]   Is the patient/caregiver able to teach back signs and symptoms of incisional infection? Increased redness, swelling or pain at the incisonal site, Incisional warmth, Fever, Increased drainage or bleeding, Pus or odor from incision   Is the patient/caregiver able to teach back steps to recovery at home? Set small, achievable goals  for return to baseline health, Eat a well-balance diet, Rest and rebuild strength, gradually increase activity   If the patient is a current smoker, are they able to teach back resources for cessation? Not a smoker   Is the patient/caregiver able to teach back the hierarchy of who to call/visit for symptoms/problems? PCP, Specialist, Home health nurse, Urgent Care, ED, 911 Yes   Week 1 call completed? Yes   Revoked No further contact(revokes)-requires comment   Graduated/Revoked comments Daughter states patient is about the same. States still very weak, has incisional pain/nausea. States following low fat diet. States kept surgeon appt yesterday. Denies any s/s of infection at incisions. Denies any needs-declined any further f/u calls.            TEO ARORA - Registered Nurse

## 2022-11-13 VITALS
RESPIRATION RATE: 17 BRPM | TEMPERATURE: 97.9 F | OXYGEN SATURATION: 98 % | HEART RATE: 66 BPM | SYSTOLIC BLOOD PRESSURE: 114 MMHG | DIASTOLIC BLOOD PRESSURE: 76 MMHG

## 2022-11-13 VITALS
RESPIRATION RATE: 18 BRPM | BODY MASS INDEX: 17.28 KG/M2 | OXYGEN SATURATION: 95 % | TEMPERATURE: 97.3 F | HEIGHT: 60 IN | HEART RATE: 80 BPM | WEIGHT: 88 LBS | DIASTOLIC BLOOD PRESSURE: 60 MMHG | SYSTOLIC BLOOD PRESSURE: 104 MMHG

## 2022-11-13 NOTE — PROGRESS NOTES
Chief Complaint  Nausea, diarrhea, generalized weakness    Subjective        Aliya Villaseñor presents to [facility]  History of Present Illness  Aliya Villaseñor is a 77-year-old female  who has significant history of COPD, melanoma of right leg s/p excision in 2019, COVID-19 infection and history of CVA (lacunar infarct). She presented to the [facility] ER on 9/1/2022 with progressively worsening dizziness and double vision.  She underwent extensive work-up which was negative.  MRI of the brain, CT of the chest, Doppler carotid ultrasound, echocardiogram, and Holter evaluation did not found an obvious cause.  She was noted to have a 1 cm ill-defined nodular density with surrounding groundglass opacity in the left upper lobe on CT chest dated 9/1/2022.  She was seen at multidisciplinary conference and we recommended following the nodule with PET/CT.  PET/CT on 9/30/2022 revealed hypermetabolic 1.5 x 1.1 cm right posterior apical pleural-based nodule with SUV of 2.9 and near complete resolution of the left upper lobe airspace opacity.     In the context of her significant history of smoking and melanoma, we needed to obtain tissue diagnosis to determine further management plan.  The lesion was located in the apical segment of the right upper lobe and was not safely accessible for percutaneous biopsy.  She had good functional status and pulmonary function to tolerate transthoracic excisional biopsy.       She underwent robot-assisted right upper lobe wedge resection, mediastinal lymph node dissection on 10/26/2022.  The pathology resulted as granulomatous infection with no evidence of malignancy.  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  She was started on low-fat diet with medium chain triglycerides and octreotide.  The chest tube output consistently remained greater than 500 cc/day despite conservative  "management.  I took her back to the OR on 10/31/2022 for robotic assisted thoracic duct ligation.  She tolerated the procedure well, however she continues to have low volume chyle leak (less than 250 cc/day).  She was kept on low-fat diet and was sent home in a stable condition.    11/10/2022: She came to the clinic for postop visit.  Patient reports significant nausea and diarrhea.  She stated that the low-fat diet is making her feel sick.  She denies fever, chills, rigors, shortness of breath.  Her chest pain is tolerable with medications.  She is able to keep her self hydrated.     Objective   Vital Signs:  /78 (BP Location: Right arm, Patient Position: Sitting, Cuff Size: Adult)   Pulse 67   Resp 16   Ht 152.4 cm (60\")   Wt 39.9 kg (88 lb)   BMI 17.19 kg/m²   Estimated body mass index is 17.19 kg/m² as calculated from the following:    Height as of this encounter: 152.4 cm (60\").    Weight as of this encounter: 39.9 kg (88 lb).    BMI is below normal parameters (malnutrition). Recommendations: treating the underlying disease process    Physical Exam  Vitals reviewed.   Constitutional:       Comments: Frail, cachectic   HENT:      Head: Normocephalic.      Right Ear: External ear normal.      Left Ear: External ear normal.      Nose: Nose normal.      Mouth/Throat:      Pharynx: Oropharynx is clear.   Cardiovascular:      Rate and Rhythm: Normal rate and regular rhythm.   Pulmonary:      Effort: Pulmonary effort is normal.      Comments: Incision well-healed.  Abdominal:      General: Abdomen is flat.   Musculoskeletal:         General: Normal range of motion.      Cervical back: Neck supple.   Skin:     General: Skin is warm.   Neurological:      General: No focal deficit present.      Mental Status: She is alert and oriented to person, place, and time.   Psychiatric:         Mood and Affect: Mood normal.        Result Review :      I reviewed her chest x-ray.  She has stable apical pneumothorax and " mild pleural bilateral effusion, right greater than left.     Assessment and Plan   Diagnoses and all orders for this visit:    1. Nodule of upper lobe of left lung (Primary)  -     CBC & Differential; Future  -     Comprehensive Metabolic Panel; Future  -     Prealbumin; Future  -     XR Chest 2 View; Future  -     ondansetron ODT (ZOFRAN-ODT) disintegrating tablet 4 mg  -     loperamide (IMODIUM) capsule 2 mg    Patient is a frail 77-year-old female who has undergone recent surgeries and recovered from chyle leak.  I am concerned about her nutritional intake and we discussed at length to improve protein caloric intake.  I will call her over the weekend to check on her and we will follow her up in a week with routine labs and x-ray.    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 I emphasized the importance of continuing low-fat diet.       I spent 30 minutes caring for Aliya on this date of service. This time includes time spent by me in the following activities:preparing for the visit, reviewing tests, obtaining and/or reviewing a separately obtained history, performing a medically appropriate examination and/or evaluation , counseling and educating the patient/family/caregiver, ordering medications, tests, or procedures, documenting information in the medical record and care coordination  Follow Up     Follow-up in 1 week with repeat routine labs and x-ray.  Patient was given instructions and counseling regarding her condition or for health maintenance advice. Please see specific information pulled into the AVS if appropriate.

## 2022-11-13 NOTE — HOME HEALTH
Incisions have healed and scabbed over with no signs of infection. Patient currently staying with friend to assist with care.

## 2022-11-14 ENCOUNTER — HOME CARE VISIT (OUTPATIENT)
Dept: HOME HEALTH SERVICES | Facility: HOME HEALTHCARE | Age: 77
End: 2022-11-14

## 2022-11-14 PROCEDURE — G0299 HHS/HOSPICE OF RN EA 15 MIN: HCPCS

## 2022-11-14 NOTE — HOME HEALTH
SN to patient;s home for routine visit.  Teaching provided as per care plan.  Reinforced teaching on proper use of incentive spirometer.  Pt able to get up to 1500 while using correctly.  Encouraged her to use every 3-4 hours while awake.  She reports significant pain that has been keeping her awake.  Gabapentin and Tramadol make her feel drowsy and she does not want to take them as much.  SHe reports not usually taking anything for pain.  Encouraged pt to take gabapentin at least at night and to take Tylenol 3-4 times daily as needed for pain for the next few days.  Chest tube sites are healing well.  Instructed to look at them daily and call for any redness, warmth or drainage.  Pt reports that she is frustrated but compliant with low fat diet.  She is to f/u with MD about it on Thursday.

## 2022-11-16 ENCOUNTER — HOME CARE VISIT (OUTPATIENT)
Dept: HOME HEALTH SERVICES | Facility: HOME HEALTHCARE | Age: 77
End: 2022-11-16

## 2022-11-16 PROCEDURE — G0300 HHS/HOSPICE OF LPN EA 15 MIN: HCPCS

## 2022-11-17 ENCOUNTER — HOSPITAL ENCOUNTER (OUTPATIENT)
Dept: GENERAL RADIOLOGY | Facility: HOSPITAL | Age: 77
Discharge: HOME | End: 2022-11-17
Admitting: SURGERY

## 2022-11-17 ENCOUNTER — OFFICE VISIT (OUTPATIENT)
Dept: SURGERY | Facility: CLINIC | Age: 77
End: 2022-11-17

## 2022-11-17 VITALS
SYSTOLIC BLOOD PRESSURE: 138 MMHG | HEIGHT: 60 IN | WEIGHT: 90 LBS | DIASTOLIC BLOOD PRESSURE: 60 MMHG | OXYGEN SATURATION: 97 % | BODY MASS INDEX: 17.67 KG/M2 | HEART RATE: 76 BPM

## 2022-11-17 DIAGNOSIS — J94.0 CHYLOTHORAX: Primary | ICD-10-CM

## 2022-11-17 DIAGNOSIS — J94.0 CHYLOTHORAX: ICD-10-CM

## 2022-11-17 PROCEDURE — 71046 X-RAY EXAM CHEST 2 VIEWS: CPT

## 2022-11-17 PROCEDURE — 99024 POSTOP FOLLOW-UP VISIT: CPT | Performed by: SURGERY

## 2022-11-17 RX ORDER — ONDANSETRON 4 MG/1
4 TABLET, FILM COATED ORAL EVERY 6 HOURS
COMMUNITY
Start: 2022-11-10

## 2022-11-17 RX ORDER — FLUTICASONE PROPIONATE AND SALMETEROL 250; 50 UG/1; UG/1
1 POWDER RESPIRATORY (INHALATION) 2 TIMES DAILY
COMMUNITY
Start: 2022-10-21

## 2022-11-17 NOTE — PROGRESS NOTES
Chief Complaint  Nausea, diarrhea, generalized weakness    Subjective        Aliya Villaseñor presents to [facility]  History of Present Illness  Aliya Villaseñor is a 77-year-old female  who has significant history of COPD, melanoma of right leg s/p excision in 2019, COVID-19 infection and history of CVA (lacunar infarct). She presented to the [facility] ER on 9/1/2022 with progressively worsening dizziness and double vision.  She underwent extensive work-up which was negative.  MRI of the brain, CT of the chest, Doppler carotid ultrasound, echocardiogram, and Holter evaluation did not found an obvious cause.  She was noted to have a 1 cm ill-defined nodular density with surrounding groundglass opacity in the left upper lobe on CT chest dated 9/1/2022.  She was seen at multidisciplinary conference and we recommended following the nodule with PET/CT.  PET/CT on 9/30/2022 revealed hypermetabolic 1.5 x 1.1 cm right posterior apical pleural-based nodule with SUV of 2.9 and near complete resolution of the left upper lobe airspace opacity.     In the context of her significant history of smoking and melanoma, we needed to obtain tissue diagnosis to determine further management plan.  The lesion was located in the apical segment of the right upper lobe and was not safely accessible for percutaneous biopsy.  She had good functional status and pulmonary function to tolerate transthoracic excisional biopsy.       She underwent robot-assisted right upper lobe wedge resection, mediastinal lymph node dissection on 10/26/2022.  The pathology resulted as granulomatous infection with no evidence of malignancy.  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  She was started on low-fat diet with medium chain triglycerides and octreotide.  The chest tube output consistently remained greater than 500 cc/day despite conservative  "management.  I took her back to the OR on 10/31/2022 for robotic assisted thoracic duct ligation.  She tolerated the procedure well, however she continues to have low volume chyle leak (less than 250 cc/day).  She was kept on low-fat diet and was sent home in a stable condition.    11/10/2022: She came to the clinic for postop visit.  Patient reports significant nausea and diarrhea.  She stated that the low-fat diet is making her feel sick.  She denies fever, chills, rigors, shortness of breath.  Her chest pain is tolerable with medications.  She is able to keep her self hydrated.     11/17/2022: Patient came to clinic today for follow-up visit.  She is feeling much better today.  Her breathing is much improved.  She denies nausea and did not have to take Zofran.  Diarrhea has been resolved.  She is still on low-fat diet.  She denies fever, chills, rigors.    Objective   Vital Signs:  There were no vitals taken for this visit.  Estimated body mass index is 17.19 kg/m² as calculated from the following:    Height as of 11/10/22: 152.4 cm (60\").    Weight as of 11/10/22: 39.9 kg (88 lb).    BMI is below normal parameters (malnutrition). Recommendations: treating the underlying disease process    Physical Exam  Vitals reviewed.   Constitutional:       Comments: Frail, cachectic   HENT:      Head: Normocephalic.      Right Ear: External ear normal.      Left Ear: External ear normal.      Nose: Nose normal.      Mouth/Throat:      Pharynx: Oropharynx is clear.   Cardiovascular:      Rate and Rhythm: Normal rate and regular rhythm.   Pulmonary:      Effort: Pulmonary effort is normal.      Comments: Incision well-healed.  Abdominal:      General: Abdomen is flat.   Musculoskeletal:         General: Normal range of motion.      Cervical back: Neck supple.   Skin:     General: Skin is warm.   Neurological:      General: No focal deficit present.      Mental Status: She is alert and oriented to person, place, and time. "   Psychiatric:         Mood and Affect: Mood normal.        Result Review :      I reviewed her chest x-ray.  Stable appearance of x-ray.     Assessment and Plan   There are no diagnoses linked to this encounter.Patient is a frail 77-year-old female who has undergone robotic assisted right upper lobe wedge resection mediastinal lymph node dissection.  Postop course was complicated by chyle leak that required thoracic duct ligation and low-fat diet.  She continued to have very low volume chyle leak that resolved with continuing low-fat diet.  The chest tube was removed and she was discharged home.  Her x-ray has remained stable in the last 1 week.  She is continuing to improve.    I recommended advancing to regular food but consume fatty meal in moderation.  I will follow her up in 2 weeks with a telephone call.     I spent 40 minutes caring for Aliya on this date of service. This time includes time spent by me in the following activities:preparing for the visit, reviewing tests, obtaining and/or reviewing a separately obtained history, performing a medically appropriate examination and/or evaluation , counseling and educating the patient/family/caregiver, ordering medications, tests, or procedures, documenting information in the medical record and care coordination  Follow Up     Follow-up in 1 week with repeat routine labs and x-ray.  Patient was given instructions and counseling regarding her condition or for health maintenance advice. Please see specific information pulled into the AVS if appropriate.

## 2022-11-20 VITALS
HEART RATE: 79 BPM | TEMPERATURE: 98.6 F | OXYGEN SATURATION: 99 % | SYSTOLIC BLOOD PRESSURE: 126 MMHG | RESPIRATION RATE: 18 BRPM | DIASTOLIC BLOOD PRESSURE: 78 MMHG

## 2022-11-21 ENCOUNTER — PATIENT OUTREACH (OUTPATIENT)
Dept: OTHER | Facility: HOSPITAL | Age: 77
End: 2022-11-21

## 2022-11-21 ENCOUNTER — OFFICE VISIT (OUTPATIENT)
Dept: NEUROLOGY | Facility: CLINIC | Age: 77
End: 2022-11-21

## 2022-11-21 ENCOUNTER — HOME CARE VISIT (OUTPATIENT)
Dept: HOME HEALTH SERVICES | Facility: HOME HEALTHCARE | Age: 77
End: 2022-11-21

## 2022-11-21 VITALS
SYSTOLIC BLOOD PRESSURE: 120 MMHG | DIASTOLIC BLOOD PRESSURE: 70 MMHG | OXYGEN SATURATION: 98 % | WEIGHT: 88.3 LBS | HEIGHT: 60 IN | BODY MASS INDEX: 17.33 KG/M2 | HEART RATE: 67 BPM

## 2022-11-21 DIAGNOSIS — F32.A DEPRESSION, UNSPECIFIED DEPRESSION TYPE: ICD-10-CM

## 2022-11-21 DIAGNOSIS — Q15.9 EYE ABNORMALITY: ICD-10-CM

## 2022-11-21 DIAGNOSIS — Z86.73 HISTORY OF STROKE: Primary | ICD-10-CM

## 2022-11-21 PROCEDURE — 99214 OFFICE O/P EST MOD 30 MIN: CPT | Performed by: NURSE PRACTITIONER

## 2022-11-21 RX ORDER — ESCITALOPRAM OXALATE 10 MG/1
5 TABLET ORAL DAILY
Qty: 30 TABLET | Refills: 1 | Status: SHIPPED | OUTPATIENT
Start: 2022-11-21 | End: 2023-02-03

## 2022-11-21 RX ORDER — ASPIRIN 81 MG/1
81 TABLET ORAL DAILY
Qty: 150 TABLET | Refills: 2 | Status: SHIPPED | OUTPATIENT
Start: 2022-11-21 | End: 2023-11-21

## 2022-11-21 NOTE — HOME HEALTH
Patient has flollow up appointment tomorrow. Surgical incisions have scabbed over and showing no signs of infection. Patient is showering without complications but is still very fatigued. Currently staying with friend to assist with recovery

## 2022-11-21 NOTE — PROGRESS NOTES
"DOS: 2022  NAME: Aliya Villaseñor   : 1945  PCP: Kike Yu MD    Patient is accompanied by her live-in \"friend/caregiver\" who assist with providing some portion of medical history.        Neurological Problem and Interval History:  77 y.o. right-handed female with known history of COPD, diverticulitis, melanoma who was seen today in follow-up from recent hospitalization 2022 where patient presented with sudden onset of vertiginous symptoms which started day prior to arrival.  Patient reported associated extreme nausea and projectile vomiting.  Patient tested positive for COVID prior to arrival and retested on arrival and COVID test remain positive.  Patient reported significant weight loss over the past 3 months initial CT of the head showed small vessel ischemic disease and scattered lacunar infarcts involving the corona radiata bilaterally.  There was no acute infarct and/or hemorrhage noted.  MRI of the brain later showed tiny 6 to 7 mm focus of acute to subacute infarct within the left corona radiata.  Moderate to severe changes bilateral small vessel white matter ischemic disease.  No evidence of hemorrhage/mass or abnormalities noted in internal auditory canals.  Carotid duplex showed bilateral ICA plaque without significant stenosis.  TTE showed EF 67%.  LV/LA normal.  Saline test was negative no mention of PFO.  Left atrial volume index 23.3.  Jeremy-Hallpike was unremarkable and there is no evidence of lateral gaze nystagmus which prompted stroke work-up aforementioned.  Patient was started on dual oral antiplatelets and high-dose statin; recommended dual oral antiplatelets x21 days then discontinuing Plavix thereafter.  CT of the chest during that admission showed a 10 mm ill-defined nodule density in the left upper lobe-there were no prior studies for comparison recommended outpatient follow-up and repeat CT of the chest patient has since had lung mass removed and this was not " "cancerous.  She notes that she had milky fluid/infection/prolonged admission and has not since resumed antiplatelets although appears to still be taking high-dose statin although after further discussion patient states \"I do not know if I am taking it\".    Since discharge, patient denies any new signs and/or symptoms of stroke.  She does report that her weight has maintained although she is still underweight-BMI 17.28.  She reports she is drinking Ensure daily.  She reports good appetite and adequate p.o. intake patient has some baseline vision deficits-seeing ophthalmology since CVA received glasses.  Denies any issues with blood pressure today /70 and heart rate 67.  She reports some depression; somewhat worse since CVA and limited income/inability to work.  Discussed low-dose Lexapro which patient is agreeable to we will start on 5 mg daily x1 month if tolerated we will plan to escalate to 10 mg daily thereafter.  Patient reports poor quality of sleep due to back pain from recent surgery before that patient reported good quality sleep; caregiver states that she does not nor and previously woke up feeling rested she denies any other complaints or concerns on my exam.  I will plan to see her back in 6 months time; sooner if needed.    Review of Systems:        Review of Systems   Constitutional: Positive for fatigue. Negative for activity change, appetite change and unexpected weight change.   HENT: Negative for dental problem, drooling, ear pain, facial swelling, hearing loss, nosebleeds, tinnitus, trouble swallowing and voice change.    Eyes: Positive for visual disturbance (blurred vision-bilaterally). Negative for photophobia and pain.   Respiratory: Negative for choking, chest tightness, shortness of breath, wheezing and stridor.    Cardiovascular: Negative for chest pain and palpitations.   Gastrointestinal: Negative for abdominal pain, constipation, diarrhea, nausea and vomiting.   Endocrine: Negative " for cold intolerance and heat intolerance.   Genitourinary: Negative for decreased urine volume, difficulty urinating, dysuria, frequency and urgency.   Musculoskeletal: Positive for gait problem (cane, walker-not using today). Negative for joint swelling, neck pain and neck stiffness.   Skin: Negative for color change, pallor, rash and wound.   Allergic/Immunologic: Negative for food allergies.   Neurological: Positive for speech difficulty and light-headedness. Negative for tremors, facial asymmetry, weakness, numbness and headaches.   Hematological: Does not bruise/bleed easily.   Psychiatric/Behavioral: Positive for confusion, decreased concentration and sleep disturbance (sleeplessness). Negative for agitation. The patient is not nervous/anxious.          Current Outpatient Medications:   •  acetaminophen (TYLENOL) 500 MG tablet, Take 2 tablets by mouth Every 8 (Eight) Hours for 14 days., Disp: 42 tablet, Rfl: 0  •  ALPRAZolam (XANAX) 0.5 MG tablet, Take 1 tablet by mouth At Night As Needed., Disp: , Rfl:   •  carvedilol (COREG) 25 MG tablet, Take 25 mg by mouth 2 (Two) Times a Day With Meals., Disp: , Rfl:   •  estradiol (ESTRACE) 2 MG tablet, Take 2 mg by mouth Daily., Disp: , Rfl:   •  fluticasone (FLONASE) 50 MCG/ACT nasal spray, 2 sprays into the nostril(s) as directed by provider Daily., Disp: , Rfl:   •  Fluticasone-Umeclidin-Vilant (TRELEGY ELLIPTA IN), Inhale 1 puff Every Morning., Disp: , Rfl:   •  gabapentin (NEURONTIN) 100 MG capsule, Take 1 capsule by mouth Every 8 (Eight) Hours for 14 days. (Patient taking differently: Take 100 mg by mouth Every 8 (Eight) Hours. Once daily at night), Disp: 42 capsule, Rfl: 0  •  loratadine (CLARITIN) 10 MG tablet, Take 1 tablet by mouth 2 (Two) Times a Day., Disp: , Rfl:   •  multivitamin (THERAGRAN) tablet tablet, Take 1 tablet by mouth Daily. PT HOLDING FOR SURGERY, Disp: , Rfl:   •  aspirin 81 MG EC tablet, Take 1 tablet by mouth Daily., Disp: 150 tablet, Rfl:  "2  •  atorvastatin (LIPITOR) 40 MG tablet, Take 1 tablet by mouth Daily., Disp: 90 tablet, Rfl: 1  •  Chlorhexidine Gluconate 2 % pads, Apply  topically. As directed pre op, Disp: , Rfl:   •  escitalopram (Lexapro) 10 MG tablet, Take 0.5 tablets by mouth Daily., Disp: 30 tablet, Rfl: 1  •  ondansetron (ZOFRAN) 4 MG tablet, Take 4 mg by mouth Every 6 (Six) Hours., Disp: , Rfl:   •  traMADol (ULTRAM) 50 MG tablet, Take 0.5 tablets by mouth Every 8 (Eight) Hours As Needed for Moderate Pain., Disp: 30 tablet, Rfl: 0  •  valsartan (DIOVAN) 80 MG tablet, Take 160 mg by mouth 2 (Two) Times a Day., Disp: , Rfl:   •  Wixela Inhub 250-50 MCG/ACT DISKUS, Inhale 1 puff 2 (Two) Times a Day., Disp: , Rfl:     \"The following portions of the patient's history were reviewed and updated as appropriate: allergies, current medications, past family history, past medical history, past social history, past surgical history and problem list.\"  Review and Interpretation of Imaging:  MG discussed above reviewed  Laboratory Results:             Lab Results   Component Value Date    HGBA1C 6.00 (H) 09/01/2022         Lab Results   Component Value Date    CHOL 189 09/01/2022         Lab Results   Component Value Date    HDL 83 (H) 09/01/2022         Lab Results   Component Value Date    LDL 82 09/01/2022         Lab Results   Component Value Date    TRIG 140 09/01/2022     No results found for: RPR  Lab Results   Component Value Date    TSH 3.770 10/01/2021     No results found for: [id number]    Physical Examination:   General Appearance:   Well developed, well nourished, well groomed, alert, and cooperative.  HEENT: Normocephalic.    Neck and Spine: Normal range of motion.  Normal alignment. No mass or tenderness. No bruits.  Cardiac: Regular rate and rhythm. No murmurs.  Peripheral Vasculature: Radial and pedal pulses are equal and symmetric.  Extremities:    No edema or deformities. Normal joint ROM.  Skin:    No rashes or birth " marks.    Neurological examination:  Higher Integrative  Function: Oriented to time, place and person. Normal registration, recall, attention span and concentration. Normal language including comprehension, spontaneous speech, repetition, reading, writing, naming and vocabulary. No neglect with normal visual-spatial function and construction. Normal fund of knowledge and higher integrative function.  CN II: Pupils are malaligned on left; normal on right, round, and reactive to light. Normal visual acuity and visual fields.  CN III IV VI: Extraocular movements are full without nystagmus.   CN V: Normal facial sensation and strength of muscles of mastication.  CN VII: Facial movements are symmetric. No weakness.  CN VIII:   Auditory acuity is normal.  CN IX & X:   Symmetric palatal movement.  CN XI: Sternocleidomastoid and trapezius are normal.  No weakness.  CN XII:   The tongue is midline.  No atrophy or fasciculations.  Motor: Normal muscle strength, bulk and tone in upper and lower extremities.  No fasciculations, rigidity, spasticity, or abnormal movements.  Reflexes: Plantar responses are flexor.  Sensation: Normal to light touch in arms and legs.  Station and Gait: Normal gait and station.    Coordination:  Finger to nose test shows no dysmetria.     Diagnoses:    1.  History of left corona radiata stroke in the setting of COVID 19; CT of the chest showed lung mass which has since been excised along with lymph nodes- found to be benign  2.  Weight loss;  3.  History of COVID 19  4.  History of melanoma  5.  History of COPD  6.  Malalignment of eye; left      Plan:   Would recommend continuing FMLA until next visit   Outpatient ophthalmology referral   Resume aspirin and statin once cleared by surgeon   Lexapro 5 mg daily x1 month if tolerated will increase to 10 mg daily thereafter   Blood pressure control to <130/80   Goal LDL <70-recommend high dose statins-    Serum glucose < 140   Call 911 for stroke any  stroke symptoms   Follow-up with me in 6  Diagnoses and all orders for this visit:    1. History of stroke (Primary)  -     aspirin 81 MG EC tablet; Take 1 tablet by mouth Daily.  Dispense: 150 tablet; Refill: 2    2. Depression, unspecified depression type  -     escitalopram (Lexapro) 10 MG tablet; Take 0.5 tablets by mouth Daily.  Dispense: 30 tablet; Refill: 1    3. Eye abnormality  -     Ambulatory Referral to Ophthalmology

## 2022-11-23 ENCOUNTER — PATIENT OUTREACH (OUTPATIENT)
Dept: OTHER | Facility: HOSPITAL | Age: 77
End: 2022-11-23

## 2022-11-23 LAB — FUNGUS WND CULT: NORMAL

## 2022-11-28 ENCOUNTER — PATIENT OUTREACH (OUTPATIENT)
Dept: OTHER | Facility: HOSPITAL | Age: 77
End: 2022-11-28

## 2022-11-28 PROCEDURE — G0180 MD CERTIFICATION HHA PATIENT: HCPCS | Performed by: SURGERY

## 2022-11-29 ENCOUNTER — PATIENT OUTREACH (OUTPATIENT)
Dept: OTHER | Facility: HOSPITAL | Age: 77
End: 2022-11-29

## 2022-11-29 NOTE — PROGRESS NOTES
Called patient. She said she needs to call me back. Rec'd call from patient after hours; she will call back today

## 2022-11-30 ENCOUNTER — HOME CARE VISIT (OUTPATIENT)
Dept: HOME HEALTH SERVICES | Facility: HOME HEALTHCARE | Age: 77
End: 2022-11-30

## 2022-11-30 PROCEDURE — G0299 HHS/HOSPICE OF RN EA 15 MIN: HCPCS

## 2022-12-01 ENCOUNTER — OFFICE VISIT (OUTPATIENT)
Dept: SURGERY | Facility: CLINIC | Age: 77
End: 2022-12-01

## 2022-12-01 DIAGNOSIS — J94.0 CHYLOTHORAX: Primary | ICD-10-CM

## 2022-12-01 PROCEDURE — 99024 POSTOP FOLLOW-UP VISIT: CPT | Performed by: SURGERY

## 2022-12-01 NOTE — PROGRESS NOTES
You have chosen to receive care through a telephone visit. Do you consent to use a telephone visit for your medical care today? Yes    Chief Complaint  Nausea, diarrhea, generalized weakness    Subjective        Aliya Villaseñor presents to [facility]  History of Present Illness  Aliya Villaseñor is a 77-year-old female  who has significant history of COPD, melanoma of right leg s/p excision in 2019, COVID-19 infection and history of CVA (lacunar infarct). She presented to the [facility] ER on 9/1/2022 with progressively worsening dizziness and double vision.  She underwent extensive work-up which was negative.  MRI of the brain, CT of the chest, Doppler carotid ultrasound, echocardiogram, and Holter evaluation did not found an obvious cause.  She was noted to have a 1 cm ill-defined nodular density with surrounding groundglass opacity in the left upper lobe on CT chest dated 9/1/2022.  She was seen at multidisciplinary conference and we recommended following the nodule with PET/CT.  PET/CT on 9/30/2022 revealed hypermetabolic 1.5 x 1.1 cm right posterior apical pleural-based nodule with SUV of 2.9 and near complete resolution of the left upper lobe airspace opacity.     In the context of her significant history of smoking and melanoma, we needed to obtain tissue diagnosis to determine further management plan.  The lesion was located in the apical segment of the right upper lobe and was not safely accessible for percutaneous biopsy.  She had good functional status and pulmonary function to tolerate transthoracic excisional biopsy.       She underwent robot-assisted right upper lobe wedge resection, mediastinal lymph node dissection on 10/26/2022.  The pathology resulted as granulomatous infection with no evidence of malignancy.  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  She was started on low-fat diet with  medium chain triglycerides and octreotide.  The chest tube output consistently remained greater than 500 cc/day despite conservative management.  I took her back to the OR on 10/31/2022 for robotic assisted thoracic duct ligation.  She tolerated the procedure well, however she continues to have low volume chyle leak (less than 250 cc/day).  She was kept on low-fat diet and was sent home in a stable condition.    11/10/2022: She came to the clinic for postop visit.  Patient reports significant nausea and diarrhea.  She stated that the low-fat diet is making her feel sick.  She denies fever, chills, rigors, shortness of breath.  Her chest pain is tolerable with medications.  She is able to keep her self hydrated.     11/17/2022: Patient came to clinic today for follow-up visit.  She is feeling much better today.  Her breathing is much improved.  She denies nausea and did not have to take Zofran.  Diarrhea has been resolved.  She is still on low-fat diet.  She denies fever, chills, rigors.     12/1/2022: Interview was conducted over the telephone.  Patient reported that she is getting significantly better.  She still complaining of incisional pain and is using occasional Tylenol and tramadol.  She denies nausea, vomiting.  She is tolerating regular food.  She reports intermittent diarrhea.  Her energy level is improving.  Her appetite is improving.  She denies fever, chills, rigors, drainage from the incision site.  She is drinking 1-2 Ensure clear every day.    Objective   No vital signs were taken during televisit.    BMI is below normal parameters (malnutrition). Recommendations: treating the underlying disease process    Physical examination was not performed during televisit.    Result Review :      No lab or imaging available to review.     Assessment and Plan   There are no diagnoses linked to this encounter.Patient is a frail 77-year-old female who has undergone robotic assisted right upper lobe wedge resection  mediastinal lymph node dissection.  Postop course was complicated by chyle leak that required thoracic duct ligation and low-fat diet.  She continued to have very low volume chyle leak that resolved with continuing low-fat diet.  The chest tube was removed and she was discharged home.  Her x-ray has remained stable postoperatively.  She is continuing to improve.     I recommended continuing regular food with supplements as needed.  I informed the patient that surgical pain may persist for couple of months.  I anticipate that it will improve over the next month.    She can follow-up with me as needed.     I spent 30 minutes caring for Aliya on this date of service. This time includes time spent by me in the following activities:preparing for the visit, reviewing tests, obtaining and/or reviewing a separately obtained history, counseling and educating the patient/family/caregiver, , documenting information in the medical record and care coordination  Follow Up     Follow-up as needed.    Matt Wood MD  Thoracic Surgeon

## 2022-12-02 ENCOUNTER — TELEPHONE (OUTPATIENT)
Dept: NEUROLOGY | Facility: CLINIC | Age: 77
End: 2022-12-02

## 2022-12-02 VITALS
OXYGEN SATURATION: 98 % | RESPIRATION RATE: 18 BRPM | TEMPERATURE: 97.8 F | SYSTOLIC BLOOD PRESSURE: 150 MMHG | HEART RATE: 62 BPM | DIASTOLIC BLOOD PRESSURE: 72 MMHG

## 2022-12-02 RX ORDER — ATORVASTATIN CALCIUM 40 MG/1
40 TABLET, FILM COATED ORAL DAILY
Qty: 90 TABLET | Refills: 1 | OUTPATIENT
Start: 2022-12-02

## 2022-12-02 NOTE — TELEPHONE ENCOUNTER
Caller: JYOTI    Relationship: SELF    Best call back number: [phone]     What form or medical record are you requesting: FMLA PPW    Who is requesting this form or medical record from you: MILI    How would you like to receive the form or medical records (pick-up, mail, fax): SHE IS FAXING TO CLINIC - FAX BACK WILL BE ON THE PPW    Timeframe paperwork needed: PPW IS DUE BY 12-8-22    Additional notes: PER OV NOTE PT SHOULD CONTINUE FMLA UNTIL NEXT VISIT

## 2022-12-02 NOTE — TELEPHONE ENCOUNTER
Caller: JYOTI  Relationship: SELF  Best call back number: [phone]    Requested Prescriptions:   Requested Prescriptions     Pending Prescriptions Disp Refills   • atorvastatin (LIPITOR) 40 MG tablet 90 tablet 1     Sig: Take 1 tablet by mouth Daily.        Pharmacy where request should be sent: [id number] [place], [place] - [address] [address] AT [facility] & [address] - [phone] [facility] [phone] FX     Additional details provided by patient: PT WAS CLEARED BY  PER PT TO CONTINUE MEDICATION    Does the patient have less than a 3 day supply:  [x] Yes  [] No    Would you like a call back once the refill request has been completed: [x] Yes [] No    If the office needs to give you a call back, can they leave a voicemail: [x] Yes [] No    Kurt Boykin Rep   12/02/22 11:55 EST

## 2023-01-10 ENCOUNTER — PATIENT OUTREACH (OUTPATIENT)
Dept: OTHER | Facility: HOSPITAL | Age: 78
End: 2023-01-10
Payer: MEDICARE

## 2023-01-10 DIAGNOSIS — R91.1 NODULE OF UPPER LOBE OF LEFT LUNG: Primary | ICD-10-CM

## 2023-01-10 NOTE — PROGRESS NOTES
Called patient. She is recovering from RSV. She was on antibiotics for a while and is feeling better.     She saw her PCP yesterday. She complains of pain/redness in her hands. He juan multiple vials of blood and ordered xrays of her hands. She is waiting to hear back with the results.    She still can't drive. She is walking around the block. During things around the house. She sees neurologist soon.  She is determined that she will feel better soon and that \"things will be better\".    She is still staying with her friend. Her daughter and grandchildren (one is 20 who is autistic and one is 16) are still staying at her condo. Her daughter is working and trying to pay the bills there.     She said her employer let her go since she couldn't come back to work. She has unemployment paperwork although is not well enough to work at this time. She used disability through work although states that ran out. She states she has no income coming in although has \"some money in the bank\". We discussed OSW services. She would appreciate if the OSW would call her to discuss possible resources. I reached out to Dr. Wood about this.    I will call in another month or so; she has my info and will call as needed

## 2023-01-16 ENCOUNTER — TELEPHONE (OUTPATIENT)
Dept: OTHER | Facility: HOSPITAL | Age: 78
End: 2023-01-16
Payer: MEDICARE

## 2023-01-16 NOTE — TELEPHONE ENCOUNTER
Oncology Social Work    Referral received from [facility] - [facility] Nurse Navigator to contact patient in regards to financial assistance.  OSW called and no answer.  Unable to leave voicemail message.     Will attempt again  Patricia Brownlee, XIOMARAW, CSW

## 2023-01-20 ENCOUNTER — TELEPHONE (OUTPATIENT)
Dept: OTHER | Facility: HOSPITAL | Age: 78
End: 2023-01-20
Payer: MEDICARE

## 2023-01-20 NOTE — TELEPHONE ENCOUNTER
Oncology Social Work    OSW attempted again today to talk with patient regarding her financial distress.  Was able to leave voicemail message this time.  Awaiting return call.    Patricia Brownlee, XIOMARAW, CSW

## 2023-02-03 ENCOUNTER — TELEPHONE (OUTPATIENT)
Dept: NEUROLOGY | Facility: CLINIC | Age: 78
End: 2023-02-03
Payer: MEDICARE

## 2023-02-03 ENCOUNTER — PATIENT OUTREACH (OUTPATIENT)
Dept: OTHER | Facility: HOSPITAL | Age: 78
End: 2023-02-03
Payer: MEDICARE

## 2023-02-03 DIAGNOSIS — F32.A DEPRESSION, UNSPECIFIED DEPRESSION TYPE: ICD-10-CM

## 2023-02-03 RX ORDER — ESCITALOPRAM OXALATE 10 MG/1
10 TABLET ORAL DAILY
Qty: 30 TABLET | Refills: 5 | Status: SHIPPED | OUTPATIENT
Start: 2023-02-03

## 2023-02-03 NOTE — TELEPHONE ENCOUNTER
Provider: LISSETH MARTINEZ  Caller: JYOTI PEDRO   Relationship to Patient: SELF  Phone Number: [phone]    Reason for Call: PATIENT CALLED, STATES SHE HAS CONCERNS ABOUT HER MEDICAL STATE AND BEING ABLE TO DRIVE AND SHE HAS NOW LOST HER JOB. SHE ALSO STATES THAT LISSETH WAS GOING TO UP A DOSAGE OF HER MEDICATION, AND WANTS TO KNOW BEFORE CALLING IN HER REFILL IF SHE NEEDS TO WAIT ON THIS. PATIENT STATES IF SHE NEEDS TO COME INTO THE OFFICE. SHE CAN GET A RIDE. SHE WOULD LIKE TO SPEAK TO LISSETH MARTINEZ ABOUT THESE CONCERNS.     PLEASE CALL PATIENT AND ADVISE AT EARLIEST CONVINCE

## 2023-02-03 NOTE — PROGRESS NOTES
Called patient. She is doing ok. Recovered from RSV.  I explained that both Patricia BEDOLLA and Arash financial navigator have been trying to reach her.  She will call them back.    She denied any other needs.

## 2023-02-15 ENCOUNTER — PATIENT OUTREACH (OUTPATIENT)
Dept: OTHER | Facility: HOSPITAL | Age: 78
End: 2023-02-15
Payer: MEDICARE

## 2023-02-27 ENCOUNTER — PATIENT OUTREACH (OUTPATIENT)
Dept: OTHER | Facility: HOSPITAL | Age: 78
End: 2023-02-27
Payer: MEDICARE

## 2023-02-27 NOTE — PROGRESS NOTES
Called patient. Left message that I was just checking in to see how she was doing and if she needed anything. Since she is doing ok after surgery and she doesn't have a malignancy, I will be closing case. Left call back number she needs us in the future.

## 2023-05-23 NOTE — PROGRESS NOTES
Called patient. Left message requesting cb. Wanted to see how she was doing and if she had any resource or supportive care needs right now.   
severe

## 2023-05-30 ENCOUNTER — OFFICE VISIT (OUTPATIENT)
Dept: NEUROLOGY | Facility: CLINIC | Age: 78
End: 2023-05-30

## 2023-05-30 VITALS
DIASTOLIC BLOOD PRESSURE: 80 MMHG | SYSTOLIC BLOOD PRESSURE: 120 MMHG | BODY MASS INDEX: 19.24 KG/M2 | HEIGHT: 60 IN | WEIGHT: 98 LBS

## 2023-05-30 DIAGNOSIS — I69.398 IMBALANCE DUE TO OLD STROKE: Primary | ICD-10-CM

## 2023-05-30 DIAGNOSIS — R26.89 IMBALANCE DUE TO OLD STROKE: Primary | ICD-10-CM

## 2023-05-30 PROCEDURE — 1160F RVW MEDS BY RX/DR IN RCRD: CPT | Performed by: NURSE PRACTITIONER

## 2023-05-30 PROCEDURE — 99214 OFFICE O/P EST MOD 30 MIN: CPT | Performed by: NURSE PRACTITIONER

## 2023-05-30 PROCEDURE — 1159F MED LIST DOCD IN RCRD: CPT | Performed by: NURSE PRACTITIONER

## 2023-05-30 NOTE — LETTER
May 30, 2023       No Recipients    Patient: Aliya Villaseñor   YOB: 1945   Date of Visit: 2023       Dear Dr. Boyle Recipients:    Thank you for referring Aliya Villaseñor to me for evaluation. Below are the relevant portions of my assessment and plan of care.    If you have questions, please do not hesitate to call me. I look forward to following Aliya along with you.         Sincerely,        ABE Fofana        CC:   No Recipients    Jane Wilkerson APRN  [phone]  Signed  DOS: 2023  NAME: Ailya Villaseñor   : 1945  PCP: Kike Yu MD      Patient is accompanied by significant other who does not provide any portion of medical history.    Neurological Problem and Interval History:  77 y.o. right-handed female with a with COPD, diverticulitis, melanoma Mom seen today in follow-up for stroke where patient presented 2022 due to sudden onset of vertiginous symptoms in the setting of COVID- MRI of the brain later confirmed acute left corona radiata stroke.  During that hospitalization patient had incidental CT of the chest showing lung mass which has since been excised along with lymph nodes-found to be benign.  Patient had marked weight loss at last appointment and has since gained approximately 10 to 12 pounds.  She continues to report fatigue/decreased stamina.  She had taken a leave of absence from work and has since been let go due to amount of time that has elapsed.  During last appointment noted malalignment of left eye and referred to ophthalmology-patient reports persistent double/blurred vision-continued follow-up and management with neuro-ophthalmology.  Patient does not check blood pressure at home although today 120/80-advised to monitor more closely at home given weight loss and being on blood pressure medication patient is agreeable.  At last appointment due to decreased/depressed mood recommended initiation of Lexapro which patient reports has  "improved her mood overall.  She reports poor quality of sleep and often wakes up feeling not rested-naps a lot would recommend consideration of NEGAR evaluation in the future-declined at this time.  She continues to take aspirin and statin for secondary stroke prevention.  She denies any new signs and/or symptoms of stroke.  She has had no falls although reports intermittent dizziness with position changes she states since the stroke \"I feel like I am in a different world\".  She previously used a cane and has graduated to no assistive devices.  Due to persistent dizziness advised outpatient PT which patient is agreeable to.  I will plan to see patient in 6 months; sooner if needed.       Review of Systems:        Review of Systems   Constitutional: Negative for fatigue.   Eyes: Negative for photophobia, pain and visual disturbance.   Respiratory: Negative for chest tightness, shortness of breath and wheezing.    Cardiovascular: Negative for chest pain and palpitations.   Neurological: Negative for weakness.   Psychiatric/Behavioral: Negative for confusion and decreased concentration.         Current Outpatient Medications:   •  ALPRAZolam (XANAX) 0.5 MG tablet, Take 1 tablet by mouth At Night As Needed., Disp: , Rfl:   •  aspirin 81 MG EC tablet, Take 1 tablet by mouth Daily., Disp: 150 tablet, Rfl: 2  •  atorvastatin (LIPITOR) 40 MG tablet, Take 1 tablet by mouth Daily., Disp: 90 tablet, Rfl: 1  •  carvedilol (COREG) 25 MG tablet, Take 25 mg by mouth 2 (Two) Times a Day With Meals., Disp: , Rfl:   •  Chlorhexidine Gluconate 2 % pads, Apply  topically. As directed pre op, Disp: , Rfl:   •  escitalopram (Lexapro) 10 MG tablet, Take 1 tablet by mouth Daily., Disp: 30 tablet, Rfl: 5  •  estradiol (ESTRACE) 2 MG tablet, Take 2 mg by mouth Daily., Disp: , Rfl:   •  fluticasone (FLONASE) 50 MCG/ACT nasal spray, 2 sprays into the nostril(s) as directed by provider Daily., Disp: , Rfl:   •  Fluticasone-Umeclidin-Vilant " "(TRELEGY ELLIPTA IN), Inhale 1 puff Every Morning., Disp: , Rfl:   •  gabapentin (NEURONTIN) 100 MG capsule, Take 1 capsule by mouth Every 8 (Eight) Hours for 14 days. (Patient taking differently: Take 100 mg by mouth Every 8 (Eight) Hours. Once daily at night), Disp: 42 capsule, Rfl: 0  •  loratadine (CLARITIN) 10 MG tablet, Take 1 tablet by mouth 2 (Two) Times a Day., Disp: , Rfl:   •  multivitamin (THERAGRAN) tablet tablet, Take 1 tablet by mouth Daily. PT HOLDING FOR SURGERY, Disp: , Rfl:   •  ondansetron (ZOFRAN) 4 MG tablet, Take 4 mg by mouth Every 6 (Six) Hours., Disp: , Rfl:   •  traMADol (ULTRAM) 50 MG tablet, Take 0.5 tablets by mouth Every 8 (Eight) Hours As Needed for Moderate Pain., Disp: 30 tablet, Rfl: 0  •  valsartan (DIOVAN) 80 MG tablet, Take 160 mg by mouth 2 (Two) Times a Day., Disp: , Rfl:   •  Wixela Inhub 250-50 MCG/ACT DISKUS, Inhale 1 puff 2 (Two) Times a Day., Disp: , Rfl:     \"The following portions of the patient's history were reviewed and updated as appropriate: allergies, current medications, past family history, past medical history, past social history, past surgical history and problem list.\"  Review and Interpretation of Imaging:  No new imaging reviewed  Laboratory Results:             Lab Results   Component Value Date    HGBA1C 6.00 (H) 09/01/2022         Lab Results   Component Value Date    CHOL 189 09/01/2022         Lab Results   Component Value Date    HDL 83 (H) 09/01/2022         Lab Results   Component Value Date    LDL 82 09/01/2022         Lab Results   Component Value Date    TRIG 140 09/01/2022     No results found for: RPR  Lab Results   Component Value Date    TSH 3.770 10/01/2021     Physical Examination:   General Appearance:           Well developed, well nourished, well groomed, alert, and cooperative.  HEENT:            Normocephalic.    Neck and Spine:         Normal range of motion.  Normal alignment. No mass or tenderness. No bruits.  Cardiac:              "                    Regular rate and rhythm. No murmurs.  Peripheral Vasculature:       Radial and pedal pulses are equal and symmetric.  Extremities:                           No edema or deformities. Normal joint ROM.  Skin:                                       No rashes or birth marks.     Neurological examination:  Higher Integrative  Function:         Oriented to time, place and person. Normal registration, recall, attention span and concentration. Normal language including comprehension, spontaneous speech, repetition, reading, writing, naming and vocabulary. No neglect with normal visual-spatial function and construction. Normal fund of knowledge and higher integrative function.  CN II:    Pupils are malaligned on left; normal on right, round, and reactive to light. Normal visual acuity and visual fields.  CN III IV VI:      Extraocular movements are full without nystagmus.   CN V:   Normal facial sensation and strength of muscles of mastication.  CN VII:             Facial movements are symmetric. No weakness.  CN VIII:                                   Auditory acuity is normal.  CN IX & X:                              Symmetric palatal movement.  CN XI:  Sternocleidomastoid and trapezius are normal.  No weakness.  CN XII:                                    The tongue is midline.  No atrophy or fasciculations.  Motor:  Normal muscle strength, bulk and tone in upper and lower extremities.  No fasciculations, rigidity, spasticity, or abnormal movements.  Sensation:       Normal to light touch in arms and legs.  Station and Gait:        Normal gait and station.    Coordination:                        Finger to nose test shows no dysmetria.     Diagnoses / Discussion:    1.  History of left corona radiata stroke in the setting of COVID 19; CT of the chest showed lung mass which has since been excised along with lymph nodes- found to be benign.  On aspirin and statin for secondary stroke prevention  2.  History  of on and weight loss-since regained weight  3.  History of COVID 19  4.  History of melanoma  5.  History of COPD  6.  Malalignment of eye; left    Plan:     · Continue aspirin and statin as written for secondary stroke prevention-longer  · Continue Lexapro 10 mg daily  · Consider outpatient NEGAR evaluation  · Obtain office notes from Dr. Barahona  · Outpatient PT due to persistent dizziness  · Avoid hypotension/dehydration/monitor BP at home.  · Follow-up with me in 6 months; sooner if needed

## 2023-05-30 NOTE — PROGRESS NOTES
"DOS: 2023  NAME: Aliya Villaseñor   : 1945  PCP: Kike Yu MD      Patient is accompanied by significant other who does not provide any portion of medical history.    Neurological Problem and Interval History:  77 y.o. right-handed female with a with COPD, diverticulitis, melanoma Mom seen today in follow-up for stroke where patient presented 2022 due to sudden onset of vertiginous symptoms in the setting of COVID- MRI of the brain later confirmed acute left corona radiata stroke.  During that hospitalization patient had incidental CT of the chest showing lung mass which has since been excised along with lymph nodes-found to be benign.  Patient had marked weight loss at last appointment and has since gained approximately 10 to 12 pounds.  She continues to report fatigue/decreased stamina.  She had taken a leave of absence from work and has since been let go due to amount of time that has elapsed.  During last appointment noted malalignment of left eye and referred to ophthalmology-patient reports persistent double/blurred vision-continued follow-up and management with neuro-ophthalmology.  Patient does not check blood pressure at home although today 120/80-advised to monitor more closely at home given weight loss and being on blood pressure medication patient is agreeable.  At last appointment due to decreased/depressed mood recommended initiation of Lexapro which patient reports has improved her mood overall.  She reports poor quality of sleep and often wakes up feeling not rested-naps a lot would recommend consideration of NEGAR evaluation in the future-declined at this time.  She continues to take aspirin and statin for secondary stroke prevention.  She denies any new signs and/or symptoms of stroke.  She has had no falls although reports intermittent dizziness with position changes she states since the stroke \"I feel like I am in a different world\".  She previously used a cane and has " graduated to no assistive devices.  Due to persistent dizziness advised outpatient PT which patient is agreeable to.  I will plan to see patient in 6 months; sooner if needed.       Review of Systems:        Review of Systems   Constitutional: Negative for fatigue.   Eyes: Negative for photophobia, pain and visual disturbance.   Respiratory: Negative for chest tightness, shortness of breath and wheezing.    Cardiovascular: Negative for chest pain and palpitations.   Neurological: Negative for weakness.   Psychiatric/Behavioral: Negative for confusion and decreased concentration.         Current Outpatient Medications:   •  ALPRAZolam (XANAX) 0.5 MG tablet, Take 1 tablet by mouth At Night As Needed., Disp: , Rfl:   •  aspirin 81 MG EC tablet, Take 1 tablet by mouth Daily., Disp: 150 tablet, Rfl: 2  •  atorvastatin (LIPITOR) 40 MG tablet, Take 1 tablet by mouth Daily., Disp: 90 tablet, Rfl: 1  •  carvedilol (COREG) 25 MG tablet, Take 25 mg by mouth 2 (Two) Times a Day With Meals., Disp: , Rfl:   •  Chlorhexidine Gluconate 2 % pads, Apply  topically. As directed pre op, Disp: , Rfl:   •  escitalopram (Lexapro) 10 MG tablet, Take 1 tablet by mouth Daily., Disp: 30 tablet, Rfl: 5  •  estradiol (ESTRACE) 2 MG tablet, Take 2 mg by mouth Daily., Disp: , Rfl:   •  fluticasone (FLONASE) 50 MCG/ACT nasal spray, 2 sprays into the nostril(s) as directed by provider Daily., Disp: , Rfl:   •  Fluticasone-Umeclidin-Vilant (TRELEGY ELLIPTA IN), Inhale 1 puff Every Morning., Disp: , Rfl:   •  gabapentin (NEURONTIN) 100 MG capsule, Take 1 capsule by mouth Every 8 (Eight) Hours for 14 days. (Patient taking differently: Take 100 mg by mouth Every 8 (Eight) Hours. Once daily at night), Disp: 42 capsule, Rfl: 0  •  loratadine (CLARITIN) 10 MG tablet, Take 1 tablet by mouth 2 (Two) Times a Day., Disp: , Rfl:   •  multivitamin (THERAGRAN) tablet tablet, Take 1 tablet by mouth Daily. PT HOLDING FOR SURGERY, Disp: , Rfl:   •  ondansetron  "(ZOFRAN) 4 MG tablet, Take 4 mg by mouth Every 6 (Six) Hours., Disp: , Rfl:   •  traMADol (ULTRAM) 50 MG tablet, Take 0.5 tablets by mouth Every 8 (Eight) Hours As Needed for Moderate Pain., Disp: 30 tablet, Rfl: 0  •  valsartan (DIOVAN) 80 MG tablet, Take 160 mg by mouth 2 (Two) Times a Day., Disp: , Rfl:   •  Wixela Inhub 250-50 MCG/ACT DISKUS, Inhale 1 puff 2 (Two) Times a Day., Disp: , Rfl:     \"The following portions of the patient's history were reviewed and updated as appropriate: allergies, current medications, past family history, past medical history, past social history, past surgical history and problem list.\"  Review and Interpretation of Imaging:  No new imaging reviewed  Laboratory Results:             Lab Results   Component Value Date    HGBA1C 6.00 (H) 09/01/2022         Lab Results   Component Value Date    CHOL 189 09/01/2022         Lab Results   Component Value Date    HDL 83 (H) 09/01/2022         Lab Results   Component Value Date    LDL 82 09/01/2022         Lab Results   Component Value Date    TRIG 140 09/01/2022     No results found for: RPR  Lab Results   Component Value Date    TSH 3.770 10/01/2021     Physical Examination:   General Appearance:           Well developed, well nourished, well groomed, alert, and cooperative.  HEENT:            Normocephalic.    Neck and Spine:         Normal range of motion.  Normal alignment. No mass or tenderness. No bruits.  Cardiac:                                 Regular rate and rhythm. No murmurs.  Peripheral Vasculature:       Radial and pedal pulses are equal and symmetric.  Extremities:                           No edema or deformities. Normal joint ROM.  Skin:                                       No rashes or birth marks.     Neurological examination:  Higher Integrative  Function:         Oriented to time, place and person. Normal registration, recall, attention span and concentration. Normal language including comprehension, spontaneous " speech, repetition, reading, writing, naming and vocabulary. No neglect with normal visual-spatial function and construction. Normal fund of knowledge and higher integrative function.  CN II:    Pupils are malaligned on left; normal on right, round, and reactive to light. Normal visual acuity and visual fields.  CN III IV VI:      Extraocular movements are full without nystagmus.   CN V:   Normal facial sensation and strength of muscles of mastication.  CN VII:             Facial movements are symmetric. No weakness.  CN VIII:                                   Auditory acuity is normal.  CN IX & X:                              Symmetric palatal movement.  CN XI:  Sternocleidomastoid and trapezius are normal.  No weakness.  CN XII:                                    The tongue is midline.  No atrophy or fasciculations.  Motor:  Normal muscle strength, bulk and tone in upper and lower extremities.  No fasciculations, rigidity, spasticity, or abnormal movements.  Sensation:       Normal to light touch in arms and legs.  Station and Gait:        Normal gait and station.    Coordination:                        Finger to nose test shows no dysmetria.     Diagnoses / Discussion:    1.  History of left corona radiata stroke in the setting of COVID 19; CT of the chest showed lung mass which has since been excised along with lymph nodes- found to be benign.  On aspirin and statin for secondary stroke prevention  2.  History of on and weight loss-since regained weight  3.  History of COVID 19  4.  History of melanoma  5.  History of COPD  6.  Malalignment of eye; left    Plan:     · Continue aspirin and statin as written for secondary stroke prevention-longer  · Continue Lexapro 10 mg daily  · Consider outpatient NEGAR evaluation  · Obtain office notes from Dr. Barahona  · Outpatient PT due to persistent dizziness  · Avoid hypotension/dehydration/monitor BP at home.  · Follow-up with me in 6 months; sooner if needed

## 2023-07-24 DIAGNOSIS — F32.A DEPRESSION, UNSPECIFIED DEPRESSION TYPE: ICD-10-CM

## 2023-07-25 RX ORDER — ESCITALOPRAM OXALATE 10 MG/1
10 TABLET ORAL DAILY
Qty: 30 TABLET | Refills: 5 | OUTPATIENT
Start: 2023-07-25

## 2023-08-08 DIAGNOSIS — F32.A DEPRESSION, UNSPECIFIED DEPRESSION TYPE: ICD-10-CM

## 2023-08-09 RX ORDER — ESCITALOPRAM OXALATE 10 MG/1
10 TABLET ORAL DAILY
Qty: 30 TABLET | Refills: 5 | Status: SHIPPED | OUTPATIENT
Start: 2023-08-09

## 2023-11-17 ENCOUNTER — TELEPHONE (OUTPATIENT)
Dept: NEUROLOGY | Facility: CLINIC | Age: 78
End: 2023-11-17
Payer: MEDICARE

## 2023-11-17 NOTE — TELEPHONE ENCOUNTER
ABE Kearns called from [facility] stating that patient was in today for her yearly exam. Carli states that patient has been taking oral estrogen for years. She made patient aware that due to her previous stroke, she should no longer take the medication. Patient got very upset with provider. Carli just wanted to give Jane a heads up just in case patient calls and ask for a refill.

## 2023-12-08 ENCOUNTER — TELEPHONE (OUTPATIENT)
Dept: NEUROLOGY | Facility: CLINIC | Age: 78
End: 2023-12-08
Payer: MEDICARE

## 2023-12-08 NOTE — TELEPHONE ENCOUNTER
Called patient to reschedule her appt. While on the line she had a medication question:    Patient states that her GYN has retired and she only has a few days left on her estradiol 2mg. She is wanting to know if Jane can refill and manage, until she finds a new provider. As well as, refer her to a new GYN.

## 2024-01-05 NOTE — PROGRESS NOTES
DOS: 2024  NAME: Aliya Villaseñor   : 1945  PCP: Kike Yu MD    Chief Complaint   Patient presents with    Stroke     F/U      Patient is unaccompanied to today's visit.     Neurological Problem and Interval History:  78 y.o. right-handed female with a known history of COPD diverticulitis, melanoma who I am seeing today in follow-up for stroke and now with reported unsteady gait/dizziness.    Patient was last seen in follow-up by me May 2023 for the same.  At that time she denied any new signs and symptoms of stroke.  Today, continues to deny any new signs and/or symptoms of stroke.  She remains on aspirin/statin and Lexapro.  She uses no assistive devices to ambulate although she feels very and steady has some difficulty standing up and notes that she teeters back-and-forth-she reports this gets better when she walks.  She has some generalized weakness without unilateral symptoms and/or speech deficits at times she reports some poor short-term memory.  She has been unable to return to work.  At last exam she had some malalignment of her eyes and was referred to ophthalmology for persistent blurred/double vision since prescribe 2 different pairs of glasses for diplopia and presbyopia-eyes still appear very malaligned despite use of glasses on exam today.  Patient becomes symptomatic with lateral eye movements with the left greater than the right.  Completed [place]-Hallpike positive on the left with no significant nystagmus although she abruptly became dizzy and nauseated will recommend referral to ENT/vestibular rehab assist with improving symptoms.    Neurologically, nonfocal exam.  Today /62 she reports systolic blood pressures consistently will less than 140 and diastolic is consistently less than 90-she does monitor at home.  Started on Lexapro 5 mg daily with plan to increase to 10 mg daily which patient has not yet done-agreeable to proceed with dose titration.  She reports stable mood.  " She denies any issues with sleep specifically no concern for sleep apnea although reports she is up to the bathroom a lot.  She has stopped estrogen since last appointment which was advisable given history of stroke.  She denies any other complaints or concerns on my exam.      Review of Systems:        Review of Systems   Musculoskeletal:  Positive for gait problem (BALANCE PROBLEM WITH OUT FALLS).         Current Outpatient Medications:     ALPRAZolam (XANAX) 0.5 MG tablet, Take 1 tablet by mouth At Night As Needed., Disp: , Rfl:     aspirin 81 MG EC tablet, Take 1 tablet by mouth Daily., Disp: , Rfl:     atorvastatin (LIPITOR) 40 MG tablet, Take 1 tablet by mouth Daily., Disp: 90 tablet, Rfl: 1    carvedilol (COREG) 25 MG tablet, Take 1 tablet by mouth 2 (Two) Times a Day With Meals., Disp: , Rfl:     Chlorhexidine Gluconate 2 % pads, Apply  topically. As directed pre op, Disp: , Rfl:     escitalopram (LEXAPRO) 10 MG tablet, Take 1 tablet by mouth Daily., Disp: 30 tablet, Rfl: 5    fluticasone (FLONASE) 50 MCG/ACT nasal spray, 2 sprays into the nostril(s) as directed by provider Daily., Disp: , Rfl:     loratadine (CLARITIN) 10 MG tablet, Take 1 tablet by mouth 2 (Two) Times a Day., Disp: , Rfl:     multivitamin (THERAGRAN) tablet tablet, Take 1 tablet by mouth Daily. PT HOLDING FOR SURGERY, Disp: , Rfl:     valsartan (DIOVAN) 80 MG tablet, Take 2 tablets by mouth 2 (Two) Times a Day., Disp: , Rfl:     Wixela Inhub 250-50 MCG/ACT DISKUS, Inhale 1 puff 2 (Two) Times a Day., Disp: , Rfl:     \"The following portions of the patient's history were reviewed and updated as appropriate: allergies, current medications, past family history, past medical history, past social history, past surgical history and problem list.\"  Review and Interpretation of Imaging:    Laboratory Results:             Lab Results   Component Value Date    HGBA1C 6.00 (H) 09/01/2022         Lab Results   Component Value Date    CHOL 189 " 09/01/2022         Lab Results   Component Value Date    HDL 83 (H) 09/01/2022         Lab Results   Component Value Date    LDL 82 09/01/2022         Lab Results   Component Value Date    TRIG 140 09/01/2022       Lab Results   Component Value Date    TSH 3.770 10/01/2021     Physical Examination:   General Appearance:           Well developed, well nourished, well groomed, alert, and cooperative.  HEENT:                        Normocephalic.    Neck and Spine:                 Normal range of motion.  Normal alignment. No mass or tenderness. No bruits.  Cardiac:                                 Regular rate and rhythm. No murmurs.  Peripheral Vasculature:       Radial and pedal pulses are equal and symmetric.  Extremities:                           No edema or deformities. Normal joint ROM.  Skin:                                       No rashes or birth marks.     Neurological examination:  Higher Integrative  Function:                  Oriented to time, place and person. Normal registration, recall, attention span and concentration. Normal language including comprehension, spontaneous speech, repetition, reading, writing, naming and vocabulary. No neglect with normal visual-spatial function and construction. Normal fund of knowledge and higher integrative function.  CN II:         Pupils are malaligned on left; normal on right, round, and reactive to light. Normal visual acuity and visual fields.  CN III IV VI:            Extraocular movements are full with questionable lateral gaze nystagmus L> R. [place] hallpike + on right- left not tested d/t + symptoms on right   CN V:       Normal facial sensation and strength of muscles of mastication.  CN VII:                         Facial movements are symmetric. No weakness.  CN VIII:                                   Auditory acuity is normal.  CN IX & X:                              Symmetric palatal movement.  CN XI:     Sternocleidomastoid and trapezius are normal.  No  weakness.  CN XII:                                    The tongue is midline.  No atrophy or fasciculations.  Motor:     Normal muscle strength, bulk and tone in upper and lower extremities.  No fasciculations, rigidity, spasticity, or abnormal movements.  Sensation:              Normal to light touch in arms and legs.  Station and Gait:               Normal gait and station.    Coordination:                        Finger to nose test shows no dysmetria.         Diagnoses:    1.  History of left corona radiata stroke in the setting of COVID-19-patient remains on aspirin and statin for secondary stroke prevention  2.  Concern for BPPV; positive [place]-Hallpike on the left will refer to physical therapy  3.  History of melanoma  4.  History of COPD  5.  Diplopia and presbyopia-referred to neuro-ophthalmology 2 pairs of glasses provided-continued ongoing follow  6.  Abnormal CT of the chest showing lung mass since excised along with lymph nodes found to be benign  7.  Frequent nighttime urination requesting further workup and evaluation; referral to urology made    Plan:   Continue aspirin/statin as written   Increase Lexapro to 10 mg daily   Physical therapy referral   Blood pressure control to <130/80   Goal LDL <70-recommend high dose statins-    Serum glucose < 140   Call 911 for stroke any stroke symptoms   Follow-up in 6 to 12 months  Diagnoses and all orders for this visit:    1. History of stroke (Primary)    2. Depression, unspecified depression type  -     escitalopram (LEXAPRO) 10 MG tablet; Take 1 tablet by mouth Daily.  Dispense: 30 tablet; Refill: 5    3. Frequent urination at night  -     Ambulatory Referral to Urology    4. Vertigo  -     Ambulatory Referral to Physical Therapy Vestibular    5. Dizziness  -     Ambulatory Referral to Physical Therapy Vestibular    6. Imbalance due to old stroke    7. Eye abnormality    8. At high risk for falls

## 2024-01-10 ENCOUNTER — TELEPHONE (OUTPATIENT)
Dept: NEUROLOGY | Facility: CLINIC | Age: 79
End: 2024-01-10

## 2024-01-10 ENCOUNTER — OFFICE VISIT (OUTPATIENT)
Dept: NEUROLOGY | Facility: CLINIC | Age: 79
End: 2024-01-10
Payer: MEDICARE

## 2024-01-10 VITALS
HEIGHT: 60 IN | WEIGHT: 105.8 LBS | BODY MASS INDEX: 20.77 KG/M2 | SYSTOLIC BLOOD PRESSURE: 100 MMHG | DIASTOLIC BLOOD PRESSURE: 62 MMHG

## 2024-01-10 DIAGNOSIS — Z86.73 HISTORY OF STROKE: Primary | ICD-10-CM

## 2024-01-10 DIAGNOSIS — R42 DIZZINESS: ICD-10-CM

## 2024-01-10 DIAGNOSIS — Q15.9 EYE ABNORMALITY: ICD-10-CM

## 2024-01-10 DIAGNOSIS — Z91.81 AT HIGH RISK FOR FALLS: ICD-10-CM

## 2024-01-10 DIAGNOSIS — R26.89 IMBALANCE DUE TO OLD STROKE: ICD-10-CM

## 2024-01-10 DIAGNOSIS — R35.1 FREQUENT URINATION AT NIGHT: ICD-10-CM

## 2024-01-10 DIAGNOSIS — I69.398 IMBALANCE DUE TO OLD STROKE: ICD-10-CM

## 2024-01-10 DIAGNOSIS — R42 VERTIGO: ICD-10-CM

## 2024-01-10 DIAGNOSIS — F32.A DEPRESSION, UNSPECIFIED DEPRESSION TYPE: ICD-10-CM

## 2024-01-10 RX ORDER — ESCITALOPRAM OXALATE 10 MG/1
10 TABLET ORAL DAILY
Qty: 30 TABLET | Refills: 5 | Status: SHIPPED | OUTPATIENT
Start: 2024-01-10

## 2024-01-10 RX ORDER — ASPIRIN 81 MG/1
81 TABLET ORAL DAILY
COMMUNITY

## 2024-01-10 NOTE — LETTER
January 10, 2024       No Recipients    Patient: Aliya Villaseñor   YOB: 1945   Date of Visit: 1/10/2024     Dear Kike Yu MD:       Thank you for referring Aliya Villaseñor to me for evaluation. Below are the relevant portions of my assessment and plan of care.    If you have questions, please do not hesitate to call me. I look forward to following Aliya along with you.         Sincerely,        ABE Fofana        CC:   No Recipients    Jane Wilkerson APRN  [id number] [phone]  Sign when Signing Visit  DOS: 2024  NAME: Aliya Villaseñor   : 1945  PCP: Kike Yu MD    Chief Complaint   Patient presents with   • Stroke     F/U      Patient is unaccompanied to today's visit.     Neurological Problem and Interval History:  78 y.o. right-handed female with a known history of COPD diverticulitis, melanoma who I am seeing today in follow-up for stroke and now with reported unsteady gait/dizziness.    Patient was last seen in follow-up by me May 2023 for the same.  At that time she denied any new signs and symptoms of stroke.  Today, continues to deny any new signs and/or symptoms of stroke.  She remains on aspirin/statin and Lexapro.  She uses no assistive devices to ambulate although she feels very and steady has some difficulty standing up and notes that she teeters back-and-forth-she reports this gets better when she walks.  She has some generalized weakness without unilateral symptoms and/or speech deficits at times she reports some poor short-term memory.  She has been unable to return to work.  At last exam she had some malalignment of her eyes and was referred to ophthalmology for persistent blurred/double vision since prescribe 2 different pairs of glasses for diplopia and presbyopia-eyes still appear very malaligned despite use of glasses on exam today.  Patient becomes symptomatic with lateral eye movements with the left greater than the right.  Completed [place]-Hallpike  positive on the left with no significant nystagmus although she abruptly became dizzy and nauseated will recommend referral to ENT/vestibular rehab assist with improving symptoms.    Neurologically, nonfocal exam.  Today /62 she reports systolic blood pressures consistently will less than 140 and diastolic is consistently less than 90-she does monitor at home.  Started on Lexapro 5 mg daily with plan to increase to 10 mg daily which patient has not yet done-agreeable to proceed with dose titration.  She reports stable mood.  She denies any issues with sleep specifically no concern for sleep apnea although reports she is up to the bathroom a lot.  She has stopped estrogen since last appointment which was advisable given history of stroke.  She denies any other complaints or concerns on my exam.      Review of Systems:        Review of Systems   Musculoskeletal:  Positive for gait problem (BALANCE PROBLEM WITH OUT FALLS).         Current Outpatient Medications:   •  ALPRAZolam (XANAX) 0.5 MG tablet, Take 1 tablet by mouth At Night As Needed., Disp: , Rfl:   •  aspirin 81 MG EC tablet, Take 1 tablet by mouth Daily., Disp: , Rfl:   •  atorvastatin (LIPITOR) 40 MG tablet, Take 1 tablet by mouth Daily., Disp: 90 tablet, Rfl: 1  •  carvedilol (COREG) 25 MG tablet, Take 1 tablet by mouth 2 (Two) Times a Day With Meals., Disp: , Rfl:   •  Chlorhexidine Gluconate 2 % pads, Apply  topically. As directed pre op, Disp: , Rfl:   •  escitalopram (LEXAPRO) 10 MG tablet, Take 1 tablet by mouth Daily., Disp: 30 tablet, Rfl: 5  •  fluticasone (FLONASE) 50 MCG/ACT nasal spray, 2 sprays into the nostril(s) as directed by provider Daily., Disp: , Rfl:   •  loratadine (CLARITIN) 10 MG tablet, Take 1 tablet by mouth 2 (Two) Times a Day., Disp: , Rfl:   •  multivitamin (THERAGRAN) tablet tablet, Take 1 tablet by mouth Daily. PT HOLDING FOR SURGERY, Disp: , Rfl:   •  valsartan (DIOVAN) 80 MG tablet, Take 2 tablets by mouth 2 (Two)  "Times a Day., Disp: , Rfl:   •  Wixela Inhub 250-50 MCG/ACT DISKUS, Inhale 1 puff 2 (Two) Times a Day., Disp: , Rfl:     \"The following portions of the patient's history were reviewed and updated as appropriate: allergies, current medications, past family history, past medical history, past social history, past surgical history and problem list.\"  Review and Interpretation of Imaging:    Laboratory Results:             Lab Results   Component Value Date    HGBA1C 6.00 (H) 09/01/2022         Lab Results   Component Value Date    CHOL 189 09/01/2022         Lab Results   Component Value Date    HDL 83 (H) 09/01/2022         Lab Results   Component Value Date    LDL 82 09/01/2022         Lab Results   Component Value Date    TRIG 140 09/01/2022       Lab Results   Component Value Date    TSH 3.770 10/01/2021     Physical Examination:   General Appearance:           Well developed, well nourished, well groomed, alert, and cooperative.  HEENT:                        Normocephalic.    Neck and Spine:                 Normal range of motion.  Normal alignment. No mass or tenderness. No bruits.  Cardiac:                                 Regular rate and rhythm. No murmurs.  Peripheral Vasculature:       Radial and pedal pulses are equal and symmetric.  Extremities:                           No edema or deformities. Normal joint ROM.  Skin:                                       No rashes or birth marks.     Neurological examination:  Higher Integrative  Function:                  Oriented to time, place and person. Normal registration, recall, attention span and concentration. Normal language including comprehension, spontaneous speech, repetition, reading, writing, naming and vocabulary. No neglect with normal visual-spatial function and construction. Normal fund of knowledge and higher integrative function.  CN II:         Pupils are malaligned on left; normal on right, round, and reactive to light. Normal visual acuity and " visual fields.  CN III IV VI:            Extraocular movements are full with questionable lateral gaze nystagmus L> R. [place] hallpike + on right- left not tested d/t + symptoms on right   CN V:       Normal facial sensation and strength of muscles of mastication.  CN VII:                         Facial movements are symmetric. No weakness.  CN VIII:                                   Auditory acuity is normal.  CN IX & X:                              Symmetric palatal movement.  CN XI:     Sternocleidomastoid and trapezius are normal.  No weakness.  CN XII:                                    The tongue is midline.  No atrophy or fasciculations.  Motor:     Normal muscle strength, bulk and tone in upper and lower extremities.  No fasciculations, rigidity, spasticity, or abnormal movements.  Sensation:              Normal to light touch in arms and legs.  Station and Gait:               Normal gait and station.    Coordination:                        Finger to nose test shows no dysmetria.         Diagnoses:    1.  History of left corona radiata stroke in the setting of COVID-19-patient remains on aspirin and statin for secondary stroke prevention  2.  Concern for BPPV; positive [place]-Hallpike on the left will refer to physical therapy  3.  History of melanoma  4.  History of COPD  5.  Diplopia and presbyopia-referred to neuro-ophthalmology 2 pairs of glasses provided-continued ongoing follow  6.  Abnormal CT of the chest showing lung mass since excised along with lymph nodes found to be benign  7.  Frequent nighttime urination requesting further workup and evaluation; referral to urology made    Plan:   Continue aspirin/statin as written   Increase Lexapro to 10 mg daily   Physical therapy referral   Blood pressure control to <130/80   Goal LDL <70-recommend high dose statins-    Serum glucose < 140   Call 911 for stroke any stroke symptoms   Follow-up in 6 to 12 months  Diagnoses and all orders for this visit:    1.  History of stroke (Primary)    2. Depression, unspecified depression type  -     escitalopram (LEXAPRO) 10 MG tablet; Take 1 tablet by mouth Daily.  Dispense: 30 tablet; Refill: 5    3. Frequent urination at night  -     Ambulatory Referral to Urology    4. Vertigo  -     Ambulatory Referral to Physical Therapy Vestibular    5. Dizziness  -     Ambulatory Referral to Physical Therapy Vestibular    6. Imbalance due to old stroke    7. Eye abnormality    8. At high risk for falls

## 2024-01-10 NOTE — TELEPHONE ENCOUNTER
Provider: YE MARTINEZ    Caller: PATIENT     Relationship to Patient: SELF    Phone Number: [phone]    Reason for Call: PT IS WANTING A REFERRAL TO [facility] PHYSICAL THERAPY AND IS CALLING BACK WITH THEIR PHONE #    [facility]   [address] [place] [address]     PHONE # [phone]      PLEASE REVIEW AND ADVISE     THANK YOU

## 2024-01-11 NOTE — PATIENT INSTRUCTIONS
Fall Prevention in the Home, Adult  Falls can cause injuries and affect people of all ages. There are many simple things that you can do to make your home safe and to help prevent falls. Ask for help when making these changes, if needed.  What actions can I take to prevent falls?  General instructions  Use good lighting in all rooms. Replace any light bulbs that burn out, turn on lights if it is dark, and use night-lights.  Place frequently used items in easy-to-reach places. Lower the shelves around your home if necessary.  Set up furniture so that there are clear paths around it. Avoid moving your furniture around.  Remove throw rugs and other tripping hazards from the floor.  Avoid walking on wet floors.  Fix any uneven floor surfaces.  Add color or contrast paint or tape to grab bars and handrails in your home. Place contrasting color strips on the first and last steps of staircases.  When you use a stepladder, make sure that it is completely opened and that the sides and supports are firmly locked. Have someone hold the ladder while you are using it. Do not climb a closed stepladder.  Know where your pets are when moving through your home.  What can I do in the bathroom?         Keep the floor dry. Immediately clean up any water that is on the floor.  Remove soap buildup in the tub or shower regularly.  Use nonskid mats or decals on the floor of the tub or shower.  Attach bath mats securely with double-sided, nonslip rug tape.  If you need to sit down while you are in the shower, use a plastic, nonslip stool.  Install grab bars by the toilet and in the tub and shower. Do not use towel bars as grab bars.  What can I do in the bedroom?  Make sure that a bedside light is easy to reach.  Do not use oversized bedding that reaches the floor.  Have a firm chair that has side arms to use for getting dressed.  What can I do in the kitchen?  Clean up any spills right away.  If you need to reach for something above you,  use a sturdy step stool that has a grab bar.  Keep electrical cables out of the way.  Do not use floor polish or wax that makes floors slippery. If you must use wax, make sure that it is non-skid floor wax.  What can I do with my stairs?  Do not leave any items on the stairs.  Make sure that you have a light switch at the top and the bottom of the stairs. Have them installed if you do not have them.  Make sure that there are handrails on both sides of the stairs. Fix handrails that are broken or loose. Make sure that handrails are as long as the staircases.  Install non-slip stair treads on all stairs in your home.  Avoid having throw rugs at the top or bottom of stairs, or secure the rugs with carpet tape to prevent them from moving.  Choose a carpet design that does not hide the edge of steps on the stairs.  Check any carpeting to make sure that it is firmly attached to the stairs. Fix any carpet that is loose or worn.  What can I do on the outside of my home?  Use bright outdoor lighting.  Regularly repair the edges of walkways and driveways and fix any cracks.  Remove high doorway thresholds.  Trim any shrubbery on the main path into your home.  Regularly check that handrails are securely fastened and in good repair. Both sides of all steps should have handrails.  Install guardrails along the edges of any raised decks or porches.  Clear walkways of debris and clutter, including tools and rocks.  Have leaves, snow, and ice cleared regularly.  Use sand or salt on walkways during winter months.  In the garage, clean up any spills right away, including grease or oil spills.  What other actions can I take?  Wear closed-toe shoes that fit well and support your feet. Wear shoes that have rubber soles or low heels.  Use mobility aids as needed, such as canes, walkers, scooters, and crutches.  Review your medicines with your health care provider. Some medicines can cause dizziness or changes in blood pressure, which  increase your risk of falling.  Talk with your health care provider about other ways that you can decrease your risk of falls. This may include working with a physical therapist or  to improve your strength, balance, and endurance.  Where to find more information  Centers for Disease Control and PreventionLINDA: www.cdc.gov  National [place] on Aging: www.dylan.nih.gov  Contact a health care provider if:  You are afraid of falling at home.  You feel weak, drowsy, or dizzy at home.  You fall at home.  Summary  There are many simple things that you can do to make your home safe and to help prevent falls.  Ways to make your home safe include removing tripping hazards and installing grab bars in the bathroom.  Ask for help when making these changes in your home.  This information is not intended to replace advice given to you by your health care provider. Make sure you discuss any questions you have with your health care provider.  Document Revised: 09/19/2022 Document Reviewed: 07/21/2021  Elsevier Patient Education © 2023 [employer]

## 2024-03-08 ENCOUNTER — TELEPHONE (OUTPATIENT)
Dept: NEUROLOGY | Facility: CLINIC | Age: 79
End: 2024-03-08
Payer: MEDICARE

## 2024-03-08 NOTE — TELEPHONE ENCOUNTER
Provider: YE MARTINEZ APRN    Caller: JYOTI    Relationship to Patient: SELF    Phone Number: [phone]    Reason for Call: CALLING TO LET PROVIDER KNOW THAT SHE WENT [place] [facility] [place].   STATED IT WAS NEGATIVE.  STATED HER PHYSICAL THERAPY PLACE WANTS TO KNOW TO DO  NEXT?  STATED SHE ALSO SAW [facility], AND THEY PUT HER ON A MEDICIATION THAT PATIENT HAS NOT STARTED.  STATED SHE WANTS TO S/W PROVIDER ABOUT IT BEFORE TAKING IT.  STATED THE MEDICATION IS MYRBETRIQ.    When was the patient last seen: 1-10-24    PLEASE CALL & ADVISE

## 2024-07-08 ENCOUNTER — TELEPHONE (OUTPATIENT)
Dept: NEUROLOGY | Facility: CLINIC | Age: 79
End: 2024-07-08

## 2024-07-08 NOTE — TELEPHONE ENCOUNTER
Provider: ABE ANDRADE     Caller: Aliya Villaseñor    Relationship to Patient: Self    Phone Number: [phone]    Reason for Call: PT CALLED STATING SHE, AS WELL AS HER FAMILY/FRIENDS, HAVE BEGAN TO NOTICE SOME MEMORY CHANGES IN PT. PT WOULD LIKE TO KNOW IF ABE ANDRADE COULD SEE HER BACK FOR SOONER F/U APPT TO FURTHER DISCUSS.    When was the patient last seen: 1/10/2024    When is the patient's next appointment: 1/14/2025    When did it start: SEVERAL MONTHS AGO    Where is it located: HEAD    Characteristics of symptom/severity: PT STATES SHE OCCASIONALLY REPEATS HERSELF, HAVING FORGOTTEN THAT SHE HAD ALREADY SAID THE SAME THING MOMENTS AGO.     **PT DENIES ANY NEW STROKE-LIKE SYMPTOMS SUCH AS SUDDEN ONSET WEAKNESS, SPEECH CHANGES, NUMBNESS & TINGLING, FACIAL DROOPING, ETC.    Timing- Is it constant or intermittent: INTERMITTENT    What therapies/medications have you tried: NONE    PLEASE REVIEW AND ADVISE.

## 2024-07-12 NOTE — TELEPHONE ENCOUNTER
I spoke with patient and informed her of the provider's response.  Pt voices understanding and she says she will check with her PCP.

## 2024-09-25 ENCOUNTER — TELEPHONE (OUTPATIENT)
Dept: NEUROLOGY | Facility: CLINIC | Age: 79
End: 2024-09-25
Payer: MEDICARE

## 2024-10-23 ENCOUNTER — OFFICE VISIT (OUTPATIENT)
Dept: NEUROLOGY | Facility: CLINIC | Age: 79
End: 2024-10-23
Payer: MEDICARE

## 2024-10-23 VITALS
WEIGHT: 105 LBS | BODY MASS INDEX: 20.51 KG/M2 | SYSTOLIC BLOOD PRESSURE: 116 MMHG | OXYGEN SATURATION: 99 % | HEART RATE: 55 BPM | DIASTOLIC BLOOD PRESSURE: 78 MMHG

## 2024-10-23 DIAGNOSIS — H53.2 DIPLOPIA: ICD-10-CM

## 2024-10-23 DIAGNOSIS — R42 DIZZINESS: Primary | ICD-10-CM

## 2024-10-23 RX ORDER — HYDROXYZINE HYDROCHLORIDE 10 MG/1
10 TABLET, FILM COATED ORAL 3 TIMES DAILY PRN
Qty: 30 TABLET | Refills: 1 | Status: SHIPPED | OUTPATIENT
Start: 2024-10-23

## 2024-10-23 NOTE — PROGRESS NOTES
"Date of visit: 10/24/2024   Patient ID: Aliya Villaseñor  Age: 79 y.o.     Chief compliant:   Chief Complaint   Patient presents with    History of stroke         Interval history (10/24/2024):   Patient presents for follow-up, last seen 1/10/2024 with ABE Raygoza.  She was treated for history of stroke, depression, vertigo, and unspecified eye abnormality.  Reviewed history of events.  Patient is pleasant but very anxious throughout this appointment, and becomes overwhelmed and \"dizzy\" when discussing her past history.  Patient has limited insight regarding the timing of events, particularly how long before her COVID infection this dizziness began.  She attributes the majority of her symptoms to COVID infection and that she never fully recovered.  She reports when she was seen Jane Wilkerson that she was gradually improving, but in the last year she has had a slow decline.  She had moved out of her family's house as they did not have time to care for and is living with her friend, but she is uncomfortable with the situation hoping to move back soon.    She reports when she presented to the hospital 2 years ago she had vomiting, diarrhea, fever, and was told to go to the hospital.  She was found to be COVID-positive.  Neurology notes report she had concerns for BPPV based on [place]-Hallpike testing.  MRI showed a tiny focus of acute to subacute infarct in the left corona radiata and multiple remote lacunar infarcts. Within the following months, she had a lung nodule resected which she reports was not cancer. She stayed in the hospital for 15 days as she was so malnourished. She only weighed 86 pounds. She has been getting more short of breath lately.     She feels constantly dizzy. She feels like she is moving as opposed to the room spinning around her.  It makes her nauseous. Lying flat makes this better. It is worse when she is upset or tired. Bending over and coming back up gets her dizzy. She has special glasses " "due to her left eye turning in. She was getting dizziness prior to COVID, but it was only mild. She reports it is getting worse. She has undergone vestibular rehab at [facility]. She reports vestibular therapist didn't think she could help. She feels unsteady while walking. She hasn't fallen. She is very comfortable lying in bed and is her \"happy place\".     She was losing weight prior to COVID, down to 90's, but this further declined with COVID and fat restricted diet following complicated lung mass removal. She got down to 84 pounds. Her whole life she has been around 98 pounds. She has concerns regarding her memory. She is forgetful regarding details of the past and has trouble coming up with words.    She is on Lexapro, Lipitor, and ASA. She doesn't feel that Lexapro particularly helps with anything. She has been taking Xanax at night 0.5 mg for many years after her . She reports trying Meclizine twice which did not help.        Last office note (1/11/2024, Jane Wilkerson):   \" 78 y.o. right-handed female with a known history of COPD diverticulitis, melanoma who I am seeing today in follow-up for stroke and now with reported unsteady gait/dizziness.     Patient was last seen in follow-up by me May 2023 for the same.  At that time she denied any new signs and symptoms of stroke.  Today, continues to deny any new signs and/or symptoms of stroke.  She remains on aspirin/statin and Lexapro.  She uses no assistive devices to ambulate although she feels very and steady has some difficulty standing up and notes that she teeters back-and-forth-she reports this gets better when she walks.  She has some generalized weakness without unilateral symptoms and/or speech deficits at times she reports some poor short-term memory.  She has been unable to return to work.  At last exam she had some malalignment of her eyes and was referred to ophthalmology for persistent blurred/double vision since prescribe 2 different pairs of " "glasses for diplopia and presbyopia-eyes still appear very malaligned despite use of glasses on exam today.  Patient becomes symptomatic with lateral eye movements with the left greater than the right.  Completed [place]-Hallpike positive on the left with no significant nystagmus although she abruptly became dizzy and nauseated will recommend referral to ENT/vestibular rehab assist with improving symptoms.     Neurologically, nonfocal exam.  Today /62 she reports systolic blood pressures consistently will less than 140 and diastolic is consistently less than 90-she does monitor at home.  Started on Lexapro 5 mg daily with plan to increase to 10 mg daily which patient has not yet done-agreeable to proceed with dose titration.  She reports stable mood.  She denies any issues with sleep specifically no concern for sleep apnea although reports she is up to the bathroom a lot.  She has stopped estrogen since last appointment which was advisable given history of stroke.  She denies any other complaints or concerns on my exam. \"       Review of Systems   Constitutional:  Negative for unexpected weight change.   Neurological:  Positive for dizziness, weakness, light-headedness and headaches. Negative for numbness.   Psychiatric/Behavioral:  Positive for confusion and decreased concentration. Negative for behavioral problems. The patient is nervous/anxious.         The following portions of the patient's history were reviewed and updated as appropriate: allergies, current medications, past family history, past medical history, past social history, past surgical history and problem list.    Vitals:    10/23/24 1256   BP: 116/78   Pulse: 55   SpO2: 99%       Neurological Exam  Mental Status  Awake, alert and oriented to person, place and time. Recent and remote memory are intact. Speech is normal. Language is fluent with no aphasia. Attention and concentration are normal. Fund of knowledge is appropriate for level of " education.    Cranial Nerves  CN II: Visual acuity is normal. Visual fields full to confrontation.  CN III, IV, VI: Extraocular movements intact bilaterally. Normal lids and orbits bilaterally. Pupils equal round and reactive to light bilaterally.  CN V: Facial sensation is normal.  CN VII: Full and symmetric facial movement.  CN IX, X: Palate elevates symmetrically  CN XI: Shoulder shrug strength is normal.  CN XII: Tongue midline without atrophy or fasciculations.    Motor  Normal muscle bulk throughout. Normal muscle tone. Strength is 5/5 in all four extremities except as noted.    Sensory  Light touch is normal in upper and lower extremities. Pinprick is normal in upper and lower extremities. Vibration is normal in upper and lower extremities.     Reflexes                                            Right                      Left  Brachioradialis                    2+                         2+  Biceps                                 2+                         2+  Triceps                                2+                         2+  Patellar                                2+                         2+  Achilles                                2+                         2+  Right Plantar: downgoing  Left Plantar: downgoing    Right pathological reflexes: Ankle clonus absent.  Left pathological reflexes: Ankle clonus absent.    Coordination    Finger-to-nose, rapid alternating movements and heel-to-shin normal bilaterally without dysmetria.    Gait  Casual gait is normal including stance, stride, and arm swing. Romberg is absent.      Imaging: Radiology report reviewed and study personally reviewed: MRI of the brain reviewed from 2022, concern for tiny punctate left corona radiata stroke of unclear clinical significance, potentially incidental.  Labs reviewed including BMP, ESR, CRP, CBC, lipid profile, TSH    Assessment/Plan:    Aliya Villaseñor is a 79 y.o. female presenting in follow-up regarding persistent  dizziness described more vertiginous in nature with an internal sense of movement that has been persistent since COVID infection in 2022.  Patient was diagnosed with an ischemic stroke at the time of her symptoms noted in the left frontal corona radiata that is likely incidental in setting of moderate microvascular ischemic changes.  Unclear if this is representative of a long COVID syndrome.  She has also been diagnosed with BPPV, although her consistent daily symptoms that are even on provoked by movements are less clearly consistent with such and may be a dual diagnosis.  She reports symptoms were generally improved until this last year, and appears to be under a significant amount of psychosocial stress, unclear if this is secondary or an underlying contributor.  Due to high amount of anxiety, will trial Atarax as needed for both her dizziness at her anxiety.  Will follow-up on her MRI of her brain regarding unclear determined diplopia.         Diagnoses and all orders for this visit:    1. Dizziness (Primary)  -     hydrOXYzine (ATARAX) 10 MG tablet; Take 1 tablet by mouth 3 (Three) Times a Day As Needed for Anxiety (dizziness).  Dispense: 30 tablet; Refill: 1  -     MRI Brain With & Without Contrast; Future    2. Diplopia  -     MRI Brain With & Without Contrast; Future         Leoncio Esparza MD    I spent 55 minutes caring for this patient on this date of service. This time includes time spent by me in the following activities as necessary: preparing for the visit, reviewing tests, medical records and previous visits, obtaining and/or reviewing a separately obtained history, performing a medically appropriate exam and/or evaluation, counseling and educating the patient, and/or communicating with other healthcare professionals, documenting information in the medical record, independently interpreting results and communicating that information with the patient, and developing a medically appropriate treatment  plan with consideration of other conditions, medications, and treatments.

## 2024-12-05 ENCOUNTER — HOSPITAL ENCOUNTER (OUTPATIENT)
Dept: MRI IMAGING | Facility: HOSPITAL | Age: 79
Discharge: HOME | End: 2024-12-05
Admitting: PSYCHIATRY & NEUROLOGY
Payer: MEDICARE

## 2024-12-05 DIAGNOSIS — R42 DIZZINESS: ICD-10-CM

## 2024-12-05 DIAGNOSIS — H53.2 DIPLOPIA: ICD-10-CM

## 2024-12-05 PROCEDURE — 70553 MRI BRAIN STEM W/O & W/DYE: CPT

## 2024-12-05 PROCEDURE — 25510000002 GADOBENATE DIMEGLUMINE 529 MG/ML SOLUTION: Performed by: PSYCHIATRY & NEUROLOGY

## 2024-12-05 PROCEDURE — A9577 INJ MULTIHANCE: HCPCS | Performed by: PSYCHIATRY & NEUROLOGY

## 2024-12-05 RX ADMIN — GADOBENATE DIMEGLUMINE 9 ML: 529 INJECTION, SOLUTION INTRAVENOUS at 15:55

## 2024-12-17 ENCOUNTER — OFFICE VISIT (OUTPATIENT)
Dept: NEUROLOGY | Facility: CLINIC | Age: 79
End: 2024-12-17
Payer: MEDICARE

## 2024-12-17 VITALS
SYSTOLIC BLOOD PRESSURE: 100 MMHG | DIASTOLIC BLOOD PRESSURE: 66 MMHG | BODY MASS INDEX: 20.16 KG/M2 | HEIGHT: 60 IN | WEIGHT: 102.7 LBS

## 2024-12-17 DIAGNOSIS — F32.A DEPRESSION, UNSPECIFIED DEPRESSION TYPE: ICD-10-CM

## 2024-12-17 DIAGNOSIS — H53.2 DIPLOPIA: ICD-10-CM

## 2024-12-17 DIAGNOSIS — H81.8X3: Primary | ICD-10-CM

## 2024-12-17 RX ORDER — ESCITALOPRAM OXALATE 10 MG/1
10 TABLET ORAL DAILY
Qty: 30 TABLET | Refills: 5 | Status: SHIPPED | OUTPATIENT
Start: 2024-12-17

## 2024-12-17 NOTE — PROGRESS NOTES
"Date of visit: 12/17/2024   Patient ID: Aliya Villaseñor  Age: 79 y.o.     Chief compliant:   Chief Complaint   Patient presents with    Dizziness         Interval history (12/17/2024):   Even a sitting, she feels out of it and spinning. She gets double vision sort of horizontally skewed. She lost her glasses with prisms. She has replacement glasses incoming. Hydroxyzine made her feel like a zombie and didn't help. She feels like symptoms are worse than prior.  She reports she was only taking 5 mg (half tablet) of Lexapro daily.  She reports she has discussed this with Jane Wilkerson.      Interval history (10/24/2024):   Patient presents for follow-up, last seen 1/10/2024 with ABE Raygoza.  She was treated for history of stroke, depression, vertigo, and unspecified eye abnormality.  Reviewed history of events.  Patient is pleasant but very anxious throughout this appointment, and becomes overwhelmed and \"dizzy\" when discussing her past history.  Patient has limited insight regarding the timing of events, particularly how long before her COVID infection this dizziness began.  She attributes the majority of her symptoms to COVID infection and that she never fully recovered.  She reports when she was seen Jane Wilkerson that she was gradually improving, but in the last year she has had a slow decline.  She had moved out of her family's house as they did not have time to care for and is living with her friend, but she is uncomfortable with the situation hoping to move back soon.     She reports when she presented to the hospital 2 years ago she had vomiting, diarrhea, fever, and was told to go to the hospital.  She was found to be COVID-positive.  Neurology notes report she had concerns for BPPV based on [place]-Hallpike testing.  MRI showed a tiny focus of acute to subacute infarct in the left corona radiata and multiple remote lacunar infarcts. Within the following months, she had a lung nodule resected which she reports " "was not cancer. She stayed in the hospital for 15 days as she was so malnourished. She only weighed 86 pounds. She has been getting more short of breath lately.      She feels constantly dizzy. She feels like she is moving as opposed to the room spinning around her.  It makes her nauseous. Lying flat makes this better. It is worse when she is upset or tired. Bending over and coming back up gets her dizzy. She has special glasses due to her left eye turning in. She was getting dizziness prior to COVID, but it was only mild. She reports it is getting worse. She has undergone vestibular re[facility] at [facility]. She reports vestibular therapist didn't think she could help. She feels unsteady while walking. She hasn't fallen. She is very comfortable lying in bed and is her \"happy place\".      She was losing weight prior to COVID, down to 90's, but this further declined with COVID and fat restricted diet following complicated lung mass removal. She got down to 84 pounds. Her whole life she has been around 98 pounds. She has concerns regarding her memory. She is forgetful regarding details of the past and has trouble coming up with words.     She is on Lexapro, Lipitor, and ASA. She doesn't feel that Lexapro particularly helps with anything. She has been taking Xanax at night 0.5 mg for many years after her . She reports trying Meclizine twice which did not help.           Last office note (1/11/2024, Jane Wilkerson):   \" 78 y.o. right-handed female with a known history of COPD diverticulitis, melanoma who I am seeing today in follow-up for stroke and now with reported unsteady gait/dizziness.     Patient was last seen in follow-up by me May 2023 for the same.  At that time she denied any new signs and symptoms of stroke.  Today, continues to deny any new signs and/or symptoms of stroke.  She remains on aspirin/statin and Lexapro.  She uses no assistive devices to ambulate although she feels very and steady has some difficulty " "standing up and notes that she teeters back-and-forth-she reports this gets better when she walks.  She has some generalized weakness without unilateral symptoms and/or speech deficits at times she reports some poor short-term memory.  She has been unable to return to work.  At last exam she had some malalignment of her eyes and was referred to ophthalmology for persistent blurred/double vision since prescribe 2 different pairs of glasses for diplopia and presbyopia-eyes still appear very malaligned despite use of glasses on exam today.  Patient becomes symptomatic with lateral eye movements with the left greater than the right.  Completed [place]-Hallpike positive on the left with no significant nystagmus although she abruptly became dizzy and nauseated will recommend referral to ENT/vestibular rehab assist with improving symptoms.     Neurologically, nonfocal exam.  Today /62 she reports systolic blood pressures consistently will less than 140 and diastolic is consistently less than 90-she does monitor at home.  Started on Lexapro 5 mg daily with plan to increase to 10 mg daily which patient has not yet done-agreeable to proceed with dose titration.  She reports stable mood.  She denies any issues with sleep specifically no concern for sleep apnea although reports she is up to the bathroom a lot.  She has stopped estrogen since last appointment which was advisable given history of stroke.  She denies any other complaints or concerns on my exam. \"       Review of Systems   Neurological:  Positive for dizziness. Negative for tremors, seizures, syncope, facial asymmetry, speech difficulty, weakness, light-headedness, numbness and headaches.        The following portions of the patient's history were reviewed and updated as appropriate: allergies, current medications, past family history, past medical history, past social history, past surgical history and problem list.    Vitals:    12/17/24 1322   BP: 100/66 "       Neurological Exam    Physical Exam      Imaging: Radiology report reviewed: MRI brain stable    Assessment/Plan:    Portions of this assessment have been copied from previous documentation which has been thoroughly reviewed and updated as appropriate.    Aliya Villaseñor is a 79 y.o. female presenting in follow-up regarding persistent dizziness described more vertiginous in nature with an internal sense of movement that has been persistent since COVID infection in 2022.  Patient was diagnosed with an ischemic stroke at the time of her symptoms noted in the left frontal corona radiata that is likely incidental in setting of moderate microvascular ischemic changes.  Unclear if vertigo is representative of a long COVID syndrome.  Consideration of PPPD, although typically not associated with a progressive worsening either.  She has also been diagnosed with BPPV, although her consistent daily symptoms that are even unprovoked by movements are less clearly consistent with such and may be a dual diagnosis.  She reports symptoms were generally improved until this last year, and appears to be under a significant amount of psychosocial stress, unclear if this is secondary or an underlying contributor.  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  She was encouraged to increase her Lexapro to 1 tablet daily from 1/2 tablet daily.         Diagnoses and all orders for this visit:    1. 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  Dispense: 30 tablet; Refill: 5    2. Depression, unspecified depression type  -     escitalopram (LEXAPRO) 10 MG tablet; Take 1 tablet by mouth Daily.  Dispense: 30  tablet; Refill: 5    3. Diplopia  -     Ambulatory Referral to ENT (Otolaryngology)         Patient was encouraged to schedule cognitive behavioral [facility]    Leoncio Esparza MD    I spent 38 minutes caring for this patient on this date of service. This time includes time spent by me in the following activities as necessary: preparing for the visit, reviewing tests, medical records and previous visits, obtaining and/or reviewing a separately obtained history, performing a medically appropriate exam and/or evaluation, counseling and educating the patient, and/or communicating with other healthcare professionals, documenting information in the medical record, independently interpreting results and communicating that information with the patient, and developing a medically appropriate treatment plan with consideration of other conditions, medications, and treatments.

## 2024-12-17 NOTE — PATIENT INSTRUCTIONS
[facility]     Please call [phone] for an appointment or visit [facility]"[employer]    Cognitive Behavioral Therapy

## (undated) DEVICE — GLV SURG BIOGEL LTX PF 8

## (undated) DEVICE — SUT VIC 0 CT 36IN J958H

## (undated) DEVICE — GLV SURG BIOGEL LTX PF 6 1/2

## (undated) DEVICE — SUT SILK 0 TIES 30IN A306H

## (undated) DEVICE — GLV SURG SENSICARE PI MIC PF SZ8 LF STRL

## (undated) DEVICE — ENDOPATH XCEL BLADELESS TROCARS WITH STABILITY SLEEVES: Brand: ENDOPATH XCEL

## (undated) DEVICE — STAPLER SHEATH: Brand: ENDOWRIST

## (undated) DEVICE — ANTIBACTERIAL UNDYED BRAIDED (POLYGLACTIN 910), SYNTHETIC ABSORBABLE SUTURE: Brand: COATED VICRYL

## (undated) DEVICE — NDL HYPO ECLPS SFTY 22G 1 1/2IN

## (undated) DEVICE — SUT ETHIB 0/0 CT1 30IN X424H

## (undated) DEVICE — OASIS DRAIN, SINGLE, INLINE & ATS COMPATIBLE: Brand: OASIS

## (undated) DEVICE — 2, DISPOSABLE SUCTION/IRRIGATOR WITH DISPOSABLE TIP: Brand: STRYKEFLOW

## (undated) DEVICE — SUREFORM 45 CURVED-TIP: Brand: SUREFORM

## (undated) DEVICE — CANNULA SEAL

## (undated) DEVICE — LOU THORACIC: Brand: MEDLINE INDUSTRIES, INC.

## (undated) DEVICE — LAPAROSCOPIC SMOKE FILTRATION SYSTEM: Brand: PALL LAPAROSHIELD® PLUS LAPAROSCOPIC SMOKE FILTRATION SYSTEM

## (undated) DEVICE — MARKR SKIN W/RULR 2TP

## (undated) DEVICE — 24 FR STRAIGHT – SOFT PVC CATHETER: Brand: PVC THORACIC CATHETERS

## (undated) DEVICE — REDUCER: Brand: ENDOWRIST

## (undated) DEVICE — TOTAL TRAY, 16FR 10ML SIL FOLEY, URN: Brand: MEDLINE

## (undated) DEVICE — PATIENT RETURN ELECTRODE, SINGLE-USE, CONTACT QUALITY MONITORING, ADULT, WITH 9FT CORD, FOR PATIENTS WEIGING OVER 33LBS. (15KG): Brand: MEGADYNE

## (undated) DEVICE — SUT SILK 0 PSL 18IN 580H

## (undated) DEVICE — SYR LUERLOK 20CC BX/50

## (undated) DEVICE — COLUMN DRAPE

## (undated) DEVICE — SEAL

## (undated) DEVICE — NDL INJ UROL WILLIAMS CYSTO 3.7F 23G 8MM

## (undated) DEVICE — SOL ANTISTICK CAUTRY ELECTROLUBE LF

## (undated) DEVICE — Device: Brand: TISSUE RETRIEVAL SYSTEM

## (undated) DEVICE — SPNG LAP CIGARETTE KITTNER 5MM STRL PK/5

## (undated) DEVICE — ARM DRAPE

## (undated) DEVICE — C-MAC® GUIDE: Brand: C-MAC / DBLADE

## (undated) DEVICE — ADHS SKIN SURG TISS VISC PREMIERPRO EXOFIN HI/VISC FAST/DRY

## (undated) DEVICE — BLADELESS OBTURATOR: Brand: WECK VISTA

## (undated) DEVICE — SOL NACL 0.9PCT 1000ML

## (undated) DEVICE — GLV SURG SIGNATURE ESSENTIAL PF LTX SZ8

## (undated) DEVICE — PENCL ES MEGADINE EZ/CLEAN BUTN W/HOLSTR 10FT

## (undated) DEVICE — 3M™ STERI-DRAPE™ INSTRUMENT POUCH 1018L: Brand: STERI-DRAPE™

## (undated) DEVICE — EXTENSION SET, MALE LUER LOCK ADAPTER WITH RETRACTABLE COLLAR

## (undated) DEVICE — TBG INSUFFLATION LUER LOCK: Brand: MEDLINE INDUSTRIES, INC.

## (undated) DEVICE — UNIVERSAL PACK: Brand: CARDINAL HEALTH

## (undated) DEVICE — KT CLN CLEANOR SCPE

## (undated) DEVICE — APPL CHLORAPREP HI/LITE 26ML ORNG